# Patient Record
Sex: FEMALE | HISPANIC OR LATINO | Employment: UNEMPLOYED | URBAN - METROPOLITAN AREA
[De-identification: names, ages, dates, MRNs, and addresses within clinical notes are randomized per-mention and may not be internally consistent; named-entity substitution may affect disease eponyms.]

---

## 2017-01-24 ENCOUNTER — APPOINTMENT (OUTPATIENT)
Dept: URGENT CARE | Facility: MEDICAL CENTER | Age: 57
End: 2017-01-24
Payer: OTHER MISCELLANEOUS

## 2017-01-24 PROCEDURE — G0382 LEV 3 HOSP TYPE B ED VISIT: HCPCS

## 2017-01-24 PROCEDURE — 99283 EMERGENCY DEPT VISIT LOW MDM: CPT

## 2017-01-26 ENCOUNTER — APPOINTMENT (OUTPATIENT)
Dept: URGENT CARE | Facility: MEDICAL CENTER | Age: 57
End: 2017-01-26
Payer: OTHER MISCELLANEOUS

## 2017-01-26 PROCEDURE — 99213 OFFICE O/P EST LOW 20 MIN: CPT

## 2017-02-02 ENCOUNTER — HOSPITAL ENCOUNTER (OUTPATIENT)
Dept: RADIOLOGY | Facility: MEDICAL CENTER | Age: 57
Discharge: HOME | End: 2017-02-02
Attending: PHYSICIAN ASSISTANT
Payer: OTHER MISCELLANEOUS

## 2017-02-02 ENCOUNTER — TRANSCRIBE ORDERS (OUTPATIENT)
Dept: URGENT CARE | Facility: MEDICAL CENTER | Age: 57
End: 2017-02-02

## 2017-02-02 ENCOUNTER — APPOINTMENT (OUTPATIENT)
Dept: URGENT CARE | Facility: MEDICAL CENTER | Age: 57
End: 2017-02-02
Payer: OTHER MISCELLANEOUS

## 2017-02-02 DIAGNOSIS — S56.211D: ICD-10-CM

## 2017-02-02 DIAGNOSIS — S56.211D: Primary | ICD-10-CM

## 2017-02-02 PROCEDURE — 99213 OFFICE O/P EST LOW 20 MIN: CPT

## 2017-02-02 PROCEDURE — 73130 X-RAY EXAM OF HAND: CPT

## 2017-02-09 ENCOUNTER — APPOINTMENT (OUTPATIENT)
Dept: URGENT CARE | Facility: MEDICAL CENTER | Age: 57
End: 2017-02-09
Payer: OTHER MISCELLANEOUS

## 2017-02-09 PROCEDURE — 99213 OFFICE O/P EST LOW 20 MIN: CPT

## 2017-02-16 ENCOUNTER — APPOINTMENT (OUTPATIENT)
Dept: URGENT CARE | Facility: MEDICAL CENTER | Age: 57
End: 2017-02-16
Payer: OTHER MISCELLANEOUS

## 2017-02-16 PROCEDURE — 99213 OFFICE O/P EST LOW 20 MIN: CPT

## 2018-02-05 ENCOUNTER — TRANSCRIBE ORDERS (OUTPATIENT)
Dept: ADMINISTRATIVE | Facility: HOSPITAL | Age: 58
End: 2018-02-05

## 2018-02-05 DIAGNOSIS — Z12.39 SCREENING BREAST EXAMINATION: Primary | ICD-10-CM

## 2018-02-12 ENCOUNTER — HOSPITAL ENCOUNTER (OUTPATIENT)
Dept: RADIOLOGY | Facility: HOSPITAL | Age: 58
Discharge: HOME | End: 2018-02-12

## 2018-02-12 DIAGNOSIS — Z12.39 SCREENING BREAST EXAMINATION: ICD-10-CM

## 2018-02-12 PROCEDURE — 77067 SCR MAMMO BI INCL CAD: CPT

## 2018-02-27 NOTE — PROGRESS NOTES
Dr Cleo Stevens was notified, via EPIC that I have been unsuccessful in reaching this patient regarding the need for additional imaging from her screening mammogram

## 2018-03-05 ENCOUNTER — TELEPHONE (OUTPATIENT)
Dept: FAMILY MEDICINE CLINIC | Facility: CLINIC | Age: 58
End: 2018-03-05

## 2018-03-12 ENCOUNTER — APPOINTMENT (EMERGENCY)
Dept: CT IMAGING | Facility: HOSPITAL | Age: 58
End: 2018-03-12

## 2018-03-12 ENCOUNTER — HOSPITAL ENCOUNTER (OUTPATIENT)
Facility: HOSPITAL | Age: 58
Setting detail: OBSERVATION
Discharge: HOME | End: 2018-03-14
Attending: EMERGENCY MEDICINE | Admitting: INTERNAL MEDICINE

## 2018-03-12 ENCOUNTER — APPOINTMENT (EMERGENCY)
Dept: RADIOLOGY | Facility: HOSPITAL | Age: 58
End: 2018-03-12

## 2018-03-12 DIAGNOSIS — E86.0 DEHYDRATION: ICD-10-CM

## 2018-03-12 DIAGNOSIS — R11.10 VOMITING: Primary | ICD-10-CM

## 2018-03-12 DIAGNOSIS — K21.9 GASTROESOPHAGEAL REFLUX DISEASE WITHOUT ESOPHAGITIS: ICD-10-CM

## 2018-03-12 DIAGNOSIS — K52.9 GASTROENTERITIS: ICD-10-CM

## 2018-03-12 DIAGNOSIS — R10.9 ABDOMINAL PAIN: ICD-10-CM

## 2018-03-12 DIAGNOSIS — E11.9 TYPE 2 DIABETES MELLITUS WITHOUT COMPLICATION, WITHOUT LONG-TERM CURRENT USE OF INSULIN (HCC): ICD-10-CM

## 2018-03-12 DIAGNOSIS — R79.89 ELEVATED LACTIC ACID LEVEL: ICD-10-CM

## 2018-03-12 LAB
ACETONE SERPL-MCNC: NEGATIVE MG/DL
ALBUMIN SERPL BCP-MCNC: 4 G/DL (ref 3.5–5)
ALP SERPL-CCNC: 109 U/L (ref 46–116)
ALT SERPL W P-5'-P-CCNC: 22 U/L (ref 12–78)
ANION GAP SERPL CALCULATED.3IONS-SCNC: 11 MMOL/L (ref 4–13)
APTT PPP: 25 SECONDS (ref 23–35)
AST SERPL W P-5'-P-CCNC: 15 U/L (ref 5–45)
BASOPHILS # BLD AUTO: 0.02 THOUSANDS/ΜL (ref 0–0.1)
BASOPHILS NFR BLD AUTO: 0 % (ref 0–1)
BILIRUB SERPL-MCNC: 0.6 MG/DL (ref 0.2–1)
BUN SERPL-MCNC: 20 MG/DL (ref 5–25)
CALCIUM SERPL-MCNC: 9.4 MG/DL (ref 8.3–10.1)
CHLORIDE SERPL-SCNC: 100 MMOL/L (ref 100–108)
CO2 SERPL-SCNC: 30 MMOL/L (ref 21–32)
CREAT SERPL-MCNC: 0.93 MG/DL (ref 0.6–1.3)
EOSINOPHIL # BLD AUTO: 0.02 THOUSAND/ΜL (ref 0–0.61)
EOSINOPHIL NFR BLD AUTO: 0 % (ref 0–6)
ERYTHROCYTE [DISTWIDTH] IN BLOOD BY AUTOMATED COUNT: 12.8 % (ref 11.6–15.1)
GFR SERPL CREATININE-BSD FRML MDRD: 68 ML/MIN/1.73SQ M
GLUCOSE SERPL-MCNC: 232 MG/DL (ref 65–140)
GLUCOSE SERPL-MCNC: 287 MG/DL (ref 65–140)
HCT VFR BLD AUTO: 39.8 % (ref 34.8–46.1)
HGB BLD-MCNC: 13.4 G/DL (ref 11.5–15.4)
INR PPP: 0.94 (ref 0.86–1.16)
LACTATE SERPL-SCNC: 3.3 MMOL/L (ref 0.5–2)
LIPASE SERPL-CCNC: 102 U/L (ref 73–393)
LYMPHOCYTES # BLD AUTO: 1.6 THOUSANDS/ΜL (ref 0.6–4.47)
LYMPHOCYTES NFR BLD AUTO: 9 % (ref 14–44)
MCH RBC QN AUTO: 27.9 PG (ref 26.8–34.3)
MCHC RBC AUTO-ENTMCNC: 33.7 G/DL (ref 31.4–37.4)
MCV RBC AUTO: 83 FL (ref 82–98)
MONOCYTES # BLD AUTO: 0.72 THOUSAND/ΜL (ref 0.17–1.22)
MONOCYTES NFR BLD AUTO: 4 % (ref 4–12)
NEUTROPHILS # BLD AUTO: 14.74 THOUSANDS/ΜL (ref 1.85–7.62)
NEUTS SEG NFR BLD AUTO: 87 % (ref 43–75)
PLATELET # BLD AUTO: 237 THOUSANDS/UL (ref 149–390)
PMV BLD AUTO: 11.7 FL (ref 8.9–12.7)
POTASSIUM SERPL-SCNC: 4 MMOL/L (ref 3.5–5.3)
PROT SERPL-MCNC: 8.2 G/DL (ref 6.4–8.2)
PROTHROMBIN TIME: 12.8 SECONDS (ref 12.1–14.4)
RBC # BLD AUTO: 4.8 MILLION/UL (ref 3.81–5.12)
SODIUM SERPL-SCNC: 141 MMOL/L (ref 136–145)
TROPONIN I SERPL-MCNC: 0.02 NG/ML
WBC # BLD AUTO: 17.1 THOUSAND/UL (ref 4.31–10.16)

## 2018-03-12 PROCEDURE — 85610 PROTHROMBIN TIME: CPT | Performed by: EMERGENCY MEDICINE

## 2018-03-12 PROCEDURE — 87040 BLOOD CULTURE FOR BACTERIA: CPT | Performed by: EMERGENCY MEDICINE

## 2018-03-12 PROCEDURE — 80053 COMPREHEN METABOLIC PANEL: CPT | Performed by: EMERGENCY MEDICINE

## 2018-03-12 PROCEDURE — 82948 REAGENT STRIP/BLOOD GLUCOSE: CPT

## 2018-03-12 PROCEDURE — 85730 THROMBOPLASTIN TIME PARTIAL: CPT | Performed by: EMERGENCY MEDICINE

## 2018-03-12 PROCEDURE — 83690 ASSAY OF LIPASE: CPT | Performed by: EMERGENCY MEDICINE

## 2018-03-12 PROCEDURE — 93005 ELECTROCARDIOGRAM TRACING: CPT

## 2018-03-12 PROCEDURE — 96375 TX/PRO/DX INJ NEW DRUG ADDON: CPT

## 2018-03-12 PROCEDURE — 96361 HYDRATE IV INFUSION ADD-ON: CPT

## 2018-03-12 PROCEDURE — 36415 COLL VENOUS BLD VENIPUNCTURE: CPT | Performed by: EMERGENCY MEDICINE

## 2018-03-12 PROCEDURE — 82009 KETONE BODYS QUAL: CPT | Performed by: EMERGENCY MEDICINE

## 2018-03-12 PROCEDURE — 96374 THER/PROPH/DIAG INJ IV PUSH: CPT

## 2018-03-12 PROCEDURE — 83605 ASSAY OF LACTIC ACID: CPT | Performed by: EMERGENCY MEDICINE

## 2018-03-12 PROCEDURE — 71046 X-RAY EXAM CHEST 2 VIEWS: CPT

## 2018-03-12 PROCEDURE — 74177 CT ABD & PELVIS W/CONTRAST: CPT

## 2018-03-12 PROCEDURE — 84484 ASSAY OF TROPONIN QUANT: CPT | Performed by: EMERGENCY MEDICINE

## 2018-03-12 PROCEDURE — 85025 COMPLETE CBC W/AUTO DIFF WBC: CPT | Performed by: EMERGENCY MEDICINE

## 2018-03-12 RX ORDER — ONDANSETRON 2 MG/ML
4 INJECTION INTRAMUSCULAR; INTRAVENOUS ONCE
Status: COMPLETED | OUTPATIENT
Start: 2018-03-12 | End: 2018-03-12

## 2018-03-12 RX ORDER — SODIUM CHLORIDE 9 MG/ML
75 INJECTION, SOLUTION INTRAVENOUS CONTINUOUS
Status: DISCONTINUED | OUTPATIENT
Start: 2018-03-12 | End: 2018-03-14 | Stop reason: HOSPADM

## 2018-03-12 RX ADMIN — ONDANSETRON 4 MG: 2 INJECTION INTRAMUSCULAR; INTRAVENOUS at 23:23

## 2018-03-12 RX ADMIN — FAMOTIDINE 20 MG: 10 INJECTION, SOLUTION INTRAVENOUS at 23:26

## 2018-03-12 RX ADMIN — SODIUM CHLORIDE 1000 ML: 0.9 INJECTION, SOLUTION INTRAVENOUS at 23:23

## 2018-03-13 PROBLEM — K52.9 GASTROENTERITIS: Status: ACTIVE | Noted: 2018-03-13

## 2018-03-13 PROBLEM — E11.9 TYPE 2 DIABETES MELLITUS WITHOUT COMPLICATION, WITHOUT LONG-TERM CURRENT USE OF INSULIN (HCC): Status: ACTIVE | Noted: 2018-03-13

## 2018-03-13 PROBLEM — R65.20 SEVERE SEPSIS (HCC): Status: ACTIVE | Noted: 2018-03-13

## 2018-03-13 PROBLEM — A41.9 SEVERE SEPSIS (HCC): Status: ACTIVE | Noted: 2018-03-13

## 2018-03-13 PROBLEM — K21.9 GASTROESOPHAGEAL REFLUX DISEASE WITHOUT ESOPHAGITIS: Status: ACTIVE | Noted: 2018-03-13

## 2018-03-13 LAB
ANION GAP SERPL CALCULATED.3IONS-SCNC: 10 MMOL/L (ref 4–13)
ATRIAL RATE: 107 BPM
ATRIAL RATE: 99 BPM
BILIRUB UR QL STRIP: NEGATIVE
BUN SERPL-MCNC: 17 MG/DL (ref 5–25)
CALCIUM SERPL-MCNC: 8.2 MG/DL (ref 8.3–10.1)
CHLORIDE SERPL-SCNC: 105 MMOL/L (ref 100–108)
CLARITY UR: CLEAR
CO2 SERPL-SCNC: 28 MMOL/L (ref 21–32)
COLOR UR: YELLOW
CREAT SERPL-MCNC: 0.7 MG/DL (ref 0.6–1.3)
ERYTHROCYTE [DISTWIDTH] IN BLOOD BY AUTOMATED COUNT: 12.8 % (ref 11.6–15.1)
GFR SERPL CREATININE-BSD FRML MDRD: 96 ML/MIN/1.73SQ M
GLUCOSE P FAST SERPL-MCNC: 194 MG/DL (ref 65–99)
GLUCOSE SERPL-MCNC: 142 MG/DL (ref 65–140)
GLUCOSE SERPL-MCNC: 164 MG/DL (ref 65–140)
GLUCOSE SERPL-MCNC: 180 MG/DL (ref 65–140)
GLUCOSE SERPL-MCNC: 194 MG/DL (ref 65–140)
GLUCOSE SERPL-MCNC: 204 MG/DL (ref 65–140)
GLUCOSE UR STRIP-MCNC: ABNORMAL MG/DL
HCT VFR BLD AUTO: 34.2 % (ref 34.8–46.1)
HGB BLD-MCNC: 11.5 G/DL (ref 11.5–15.4)
HGB UR QL STRIP.AUTO: NEGATIVE
KETONES UR STRIP-MCNC: ABNORMAL MG/DL
LACTATE SERPL-SCNC: 1.8 MMOL/L (ref 0.5–2)
LACTATE SERPL-SCNC: 2.6 MMOL/L (ref 0.5–2)
LACTATE SERPL-SCNC: 2.9 MMOL/L (ref 0.5–2)
LACTATE SERPL-SCNC: 2.9 MMOL/L (ref 0.5–2)
LEUKOCYTE ESTERASE UR QL STRIP: NEGATIVE
MCH RBC QN AUTO: 28.2 PG (ref 26.8–34.3)
MCHC RBC AUTO-ENTMCNC: 33.6 G/DL (ref 31.4–37.4)
MCV RBC AUTO: 84 FL (ref 82–98)
NITRITE UR QL STRIP: NEGATIVE
P AXIS: 0 DEGREES
P AXIS: 55 DEGREES
PH UR STRIP.AUTO: 5.5 [PH] (ref 4.5–8)
PLATELET # BLD AUTO: 207 THOUSANDS/UL (ref 149–390)
PLATELET # BLD AUTO: 207 THOUSANDS/UL (ref 149–390)
PMV BLD AUTO: 11.3 FL (ref 8.9–12.7)
PMV BLD AUTO: 11.3 FL (ref 8.9–12.7)
POTASSIUM SERPL-SCNC: 3.8 MMOL/L (ref 3.5–5.3)
PR INTERVAL: 182 MS
PROT UR STRIP-MCNC: NEGATIVE MG/DL
QRS AXIS: 51 DEGREES
QRS AXIS: 62 DEGREES
QRSD INTERVAL: 78 MS
QRSD INTERVAL: 82 MS
QT INTERVAL: 344 MS
QT INTERVAL: 356 MS
QTC INTERVAL: 456 MS
QTC INTERVAL: 459 MS
RBC # BLD AUTO: 4.08 MILLION/UL (ref 3.81–5.12)
SODIUM SERPL-SCNC: 143 MMOL/L (ref 136–145)
SP GR UR STRIP.AUTO: 1.01 (ref 1–1.03)
T WAVE AXIS: 65 DEGREES
T WAVE AXIS: 80 DEGREES
UROBILINOGEN UR QL STRIP.AUTO: 0.2 E.U./DL
VENTRICULAR RATE: 107 BPM
VENTRICULAR RATE: 99 BPM
WBC # BLD AUTO: 9.83 THOUSAND/UL (ref 4.31–10.16)

## 2018-03-13 PROCEDURE — 99220 PR INITIAL OBSERVATION CARE/DAY 70 MINUTES: CPT | Performed by: INTERNAL MEDICINE

## 2018-03-13 PROCEDURE — 93010 ELECTROCARDIOGRAM REPORT: CPT | Performed by: INTERNAL MEDICINE

## 2018-03-13 PROCEDURE — 80048 BASIC METABOLIC PNL TOTAL CA: CPT | Performed by: PHYSICIAN ASSISTANT

## 2018-03-13 PROCEDURE — 83605 ASSAY OF LACTIC ACID: CPT | Performed by: INTERNAL MEDICINE

## 2018-03-13 PROCEDURE — 83605 ASSAY OF LACTIC ACID: CPT | Performed by: PHYSICIAN ASSISTANT

## 2018-03-13 PROCEDURE — 85027 COMPLETE CBC AUTOMATED: CPT | Performed by: PHYSICIAN ASSISTANT

## 2018-03-13 PROCEDURE — 83605 ASSAY OF LACTIC ACID: CPT | Performed by: EMERGENCY MEDICINE

## 2018-03-13 PROCEDURE — 81003 URINALYSIS AUTO W/O SCOPE: CPT

## 2018-03-13 PROCEDURE — 96361 HYDRATE IV INFUSION ADD-ON: CPT

## 2018-03-13 PROCEDURE — 82948 REAGENT STRIP/BLOOD GLUCOSE: CPT

## 2018-03-13 PROCEDURE — 99285 EMERGENCY DEPT VISIT HI MDM: CPT

## 2018-03-13 PROCEDURE — 36415 COLL VENOUS BLD VENIPUNCTURE: CPT | Performed by: EMERGENCY MEDICINE

## 2018-03-13 PROCEDURE — 85049 AUTOMATED PLATELET COUNT: CPT | Performed by: PHYSICIAN ASSISTANT

## 2018-03-13 RX ORDER — ACETAMINOPHEN 325 MG/1
650 TABLET ORAL EVERY 6 HOURS PRN
Status: DISCONTINUED | OUTPATIENT
Start: 2018-03-13 | End: 2018-03-14 | Stop reason: HOSPADM

## 2018-03-13 RX ORDER — ONDANSETRON 2 MG/ML
4 INJECTION INTRAMUSCULAR; INTRAVENOUS EVERY 6 HOURS PRN
Status: DISCONTINUED | OUTPATIENT
Start: 2018-03-13 | End: 2018-03-14 | Stop reason: HOSPADM

## 2018-03-13 RX ORDER — PRAVASTATIN SODIUM 40 MG
40 TABLET ORAL
Status: DISCONTINUED | OUTPATIENT
Start: 2018-03-13 | End: 2018-03-14 | Stop reason: HOSPADM

## 2018-03-13 RX ORDER — PRAVASTATIN SODIUM 40 MG
1 TABLET ORAL
COMMUNITY
Start: 2015-02-16 | End: 2018-10-26 | Stop reason: ALTCHOICE

## 2018-03-13 RX ORDER — CALCIUM CARBONATE 200(500)MG
1000 TABLET,CHEWABLE ORAL DAILY PRN
Status: DISCONTINUED | OUTPATIENT
Start: 2018-03-13 | End: 2018-03-14 | Stop reason: HOSPADM

## 2018-03-13 RX ORDER — SENNOSIDES 8.6 MG
1 TABLET ORAL DAILY
Status: DISCONTINUED | OUTPATIENT
Start: 2018-03-13 | End: 2018-03-14 | Stop reason: HOSPADM

## 2018-03-13 RX ADMIN — SODIUM CHLORIDE 125 ML/HR: 0.9 INJECTION, SOLUTION INTRAVENOUS at 19:13

## 2018-03-13 RX ADMIN — SODIUM CHLORIDE 125 ML/HR: 0.9 INJECTION, SOLUTION INTRAVENOUS at 03:19

## 2018-03-13 RX ADMIN — INSULIN LISPRO 2 UNITS: 100 INJECTION, SOLUTION INTRAVENOUS; SUBCUTANEOUS at 23:16

## 2018-03-13 RX ADMIN — SODIUM CHLORIDE 1000 ML: 0.9 INJECTION, SOLUTION INTRAVENOUS at 02:32

## 2018-03-13 RX ADMIN — ENOXAPARIN SODIUM 40 MG: 40 INJECTION SUBCUTANEOUS at 08:50

## 2018-03-13 RX ADMIN — SODIUM CHLORIDE 1000 ML: 0.9 INJECTION, SOLUTION INTRAVENOUS at 05:45

## 2018-03-13 RX ADMIN — PIPERACILLIN SODIUM,TAZOBACTAM SODIUM 3.38 G: 3; .375 INJECTION, POWDER, FOR SOLUTION INTRAVENOUS at 04:11

## 2018-03-13 RX ADMIN — IOHEXOL 100 ML: 350 INJECTION, SOLUTION INTRAVENOUS at 00:03

## 2018-03-13 RX ADMIN — PRAVASTATIN SODIUM 40 MG: 40 TABLET ORAL at 15:45

## 2018-03-13 RX ADMIN — ACETAMINOPHEN 650 MG: 325 TABLET, FILM COATED ORAL at 15:45

## 2018-03-13 NOTE — ED PROVIDER NOTES
History  Chief Complaint   Patient presents with    Vomiting     pt presents with c/o of vomiting x 5 since about 2100 tonight  began not feeling wee/"shakey" around 20:00  reports mild abdominal pain since vomiting     Patient is a 63year old female with nausea and vomiting with abdominal pain since tonight while at work  No diarrhea  No fever but had shivering  No urinary sx  No abdominal surgery  Has a h/o diabetes  No recent old records from this ED seen on computer system  [facility] website checked on this patient and patient not found  History provided by:  Patient and relative (daughter)  Vomiting   Associated symptoms: abdominal pain    Associated symptoms: no diarrhea and no fever        Prior to Admission Medications   Prescriptions Last Dose Informant Patient Reported? Taking?   metFORMIN (GLUCOPHAGE) 1000 MG tablet   Yes Yes   Sig: Take 1 tablet by mouth 2 (two) times a day   pravastatin (PRAVACHOL) 40 mg tablet   Yes Yes   Sig: Take 1 tablet by mouth      Facility-Administered Medications: None       Past Medical History:   Diagnosis Date    Diabetes mellitus ([facility] [address] )        No past surgical history on file  No family history on file  I have reviewed and agree with the history as documented  Social History   Substance Use Topics    Smoking status: Never Smoker    Smokeless tobacco: Never Used    Alcohol use No        Review of Systems   Constitutional: Negative for fever  Gastrointestinal: Positive for abdominal pain, nausea and vomiting  Negative for diarrhea  Genitourinary: Negative for difficulty urinating  All other systems reviewed and are negative        Physical Exam  ED Triage Vitals [03/12/18 2250]   Temperature Pulse Respirations Blood Pressure SpO2   98 5 °F (36 9 °C) 102 18 145/66 97 %      Temp Source Heart Rate Source Patient Position - Orthostatic VS BP Location FiO2 (%)   Oral Monitor Sitting Right arm --      Pain Score       8           Orthostatic Vital Signs  Vitals:    03/12/18 2250 03/13/18 0150   BP: 145/66 134/68   Pulse: 102 90   Patient Position - Orthostatic VS: Sitting Lying       Physical Exam   Constitutional: She is oriented to person, place, and time  She appears well-developed and well-nourished  She appears distressed (moderate)  HENT:   Head: Normocephalic and atraumatic  Mucous membranes somewhat moist     Eyes: No scleral icterus  Neck: Normal range of motion  Neck supple  Cardiovascular: Regular rhythm and normal heart sounds  No murmur heard  Tachycardia  Pulmonary/Chest: Effort normal and breath sounds normal  No stridor  No respiratory distress  Abdominal: Soft  Bowel sounds are normal  She exhibits no distension  There is tenderness (diffuse)  There is no rebound and no guarding  Musculoskeletal: She exhibits no edema or deformity  Neurological: She is alert and oriented to person, place, and time  Skin: Skin is warm and dry  No rash noted  Psychiatric: She has a normal mood and affect  Nursing note and vitals reviewed  ED Medications  Medications   sodium chloride 0 9 % infusion (not administered)   sodium chloride 0 9 % bolus 1,000 mL (1,000 mL Intravenous New Bag 3/13/18 0232)   ondansetron (ZOFRAN) injection 4 mg (4 mg Intravenous Given 3/12/18 2323)   sodium chloride 0 9 % bolus 1,000 mL (0 mL Intravenous Stopped 3/13/18 0228)   famotidine (PEPCID) injection 20 mg (20 mg Intravenous Given 3/12/18 2326)   iohexol (OMNIPAQUE) 350 MG/ML injection (MULTI-DOSE) 100 mL (100 mL Intravenous Given 3/13/18 0003)       Diagnostic Studies  Results Reviewed     Procedure Component Value Units Date/Time    Lactic acid, plasma [[id number]]  (Abnormal) Collected:  03/13/18 0148    Lab Status:  Final result Specimen:  Blood from Arm, Right Updated:  03/13/18 0222     LACTIC ACID 2 9 (HH) mmol/L     Narrative:         Result may be elevated if tourniquet was used during collection      Lactic acid, plasma [[id number]] (Abnormal) Collected:  03/12/18 2303    Lab Status:  Final result Specimen:  Blood from Arm, Right Updated:  03/12/18 2347     LACTIC ACID 3 3 (HH) mmol/L     Narrative:         Result may be elevated if tourniquet was used during collection  Blood culture #1 [[id number]] Collected:  03/12/18 2321    Lab Status: In process Specimen:  Blood from Arm, Left Updated:  03/12/18 2346    Troponin I [[id number]]  (Normal) Collected:  03/12/18 2303    Lab Status:  Final result Specimen:  Blood from Arm, Right Updated:  03/12/18 2340     Troponin I 0 02 ng/mL     Narrative:         [employer] Chemistry analyzer 99% cutoff is > 0 04 ng/mL in network labs    o cTnI 99% cutoff is useful only when applied to patients in the clinical setting of myocardial ischemia  o cTnI 99% cutoff should be interpreted in the context of clinical history, ECG findings and possibly cardiac imaging to establish correct diagnosis  o cTnI 99% cutoff may be suggestive but clearly not indicative of a coronary event without the clinical setting of myocardial ischemia  Pk Billings [[id number]]  (Normal) Collected:  03/12/18 2303    Lab Status:  Final result Specimen:  Blood from Arm, Right Updated:  03/12/18 2330     Protime 12 8 seconds      INR 0 94    APTT [[id number]]  (Normal) Collected:  03/12/18 2303    Lab Status:  Final result Specimen:  Blood from Arm, Right Updated:  03/12/18 2330     PTT 25 seconds     Narrative:          Therapeutic Heparin Range = 60-90 seconds    Comprehensive metabolic panel [[id number]]  (Abnormal) Collected:  03/12/18 2303    Lab Status:  Final result Specimen:  Blood from Arm, Right Updated:  03/12/18 2329     Sodium 141 mmol/L      Potassium 4 0 mmol/L      Chloride 100 mmol/L      CO2 30 mmol/L      Anion Gap 11 mmol/L      BUN 20 mg/dL      Creatinine 0 93 mg/dL      Glucose 287 (H) mg/dL      Calcium 9 4 mg/dL      AST 15 U/L      ALT 22 U/L      Alkaline Phosphatase 109 U/L      Total Protein 8 2 g/dL      Albumin 4 0 g/dL      Total Bilirubin 0 60 mg/dL      eGFR 68 ml/min/1 73sq m     Narrative:         National Kidney Disease Education Program recommendations are as follows:  GFR calculation is accurate only with a steady state creatinine  Chronic Kidney disease less than 60 ml/min/1 73 sq  meters  Kidney failure less than 15 ml/min/1 73 sq  meters  Lipase [[id number]]  (Normal) Collected:  03/12/18 2303    Lab Status:  Final result Specimen:  Blood from Arm, Right Updated:  03/12/18 2329     Lipase 102 u/L     Acetone [[id number]]  (Normal) Collected:  03/12/18 2303    Lab Status:  Final result Specimen:  Blood from Arm, Right Updated:  03/12/18 2322     Acetone, Bld Negative    CBC and differential [[id number]]  (Abnormal) Collected:  03/12/18 2303    Lab Status:  Final result Specimen:  Blood from Arm, Right Updated:  03/12/18 2313     WBC 17 10 (H) Thousand/uL      RBC 4 80 Million/uL      Hemoglobin 13 4 g/dL      Hematocrit 39 8 %      MCV 83 fL      MCH 27 9 pg      MCHC 33 7 g/dL      RDW 12 8 %      MPV 11 7 fL      Platelets 393 Thousands/uL      Neutrophils Relative 87 (H) %      Lymphocytes Relative 9 (L) %      Monocytes Relative 4 %      Eosinophils Relative 0 %      Basophils Relative 0 %      Neutrophils Absolute 14 74 (H) Thousands/µL      Lymphocytes Absolute 1 60 Thousands/µL      Monocytes Absolute 0 72 Thousand/µL      Eosinophils Absolute 0 02 Thousand/µL      Basophils Absolute 0 02 Thousands/µL     Blood culture #2 [[id number]] Collected:  03/12/18 2303    Lab Status: In process Specimen:  Blood from Arm, Right Updated:  03/12/18 2309    Fingerstick Glucose (POCT) [[id number]]  (Abnormal) Collected:  03/12/18 2301    Lab Status:  Final result Updated:  03/12/18 2307     POC Glucose 232 (H) mg/dl                  CT abdomen pelvis with contrast   ED Interpretation by Lia Nunes MD (03/13 0032)   FINDINGS:      ABDOMEN      LOWER CHEST:  Mild hypoventilatory changes at the lung bases  LIVER/BILIARY TREE: The liver is normal in contour   There is a 3 3 x 2 4 cm mildly heterogeneous hyperenhancing mass within the right lobe of the liver (series 2, image 24) which likely represents a hemangioma  GALLBLADDER:  No calcified gallstones  No pericholecystic inflammatory change  SPLEEN:  Unremarkable  PANCREAS:  Unremarkable  ADRENAL GLANDS:  Unremarkable  KIDNEYS/URETERS: [place] are a few renal hypodensities too small to characterize however likely represent cysts   No hydronephrosis  STOMACH AND BOWEL: Kvng Schaffer is no bowel obstruction   No significant bowel wall thickening  APPENDIX:  No findings to suggest appendicitis  ABDOMINOPELVIC CAVITY:  No ascites or free intraperitoneal air  No lymphadenopathy  VESSELS:  Unremarkable for patient's age  PELVIS      REPRODUCTIVE ORGANS:  Unremarkable for patient's age  URINARY BLADDER:  The bladder wall is mildly thickened however it is under distended  ABDOMINAL WALL/INGUINAL REGIONS:  Bilateral fat-containing inguinal hernias  OSSEOUS STRUCTURES:  No acute fracture or destructive osseous lesion  Impression:        1   No acute inflammatory process identified within the abdomen or pelvis  2   Mildly thickened bladder wall which may be due to underdistention   Correlate with urinalysis to exclude infection  3   3 3 cm hyperenhancing mass within the right lobe the liver likely represents a hemangioma   Further evaluation with nonemergent ultrasound may be obtained  Workstation performed: [id number]         Final Result by Reid Samson MD (03/13 0028)      1  No acute inflammatory process identified within the abdomen or pelvis  2   Mildly thickened bladder wall which may be due to underdistention  Correlate with urinalysis to exclude infection  3   3 3 cm hyperenhancing mass within the right lobe the liver likely represents a hemangioma    Further evaluation with nonemergent ultrasound may be obtained  Workstation performed: [id number]         XR chest 2 views   ED Interpretation by Austen Lares MD (03/13 0014)   Elevated R hemidiaphragm read by me  Final Result by Beltran Gudino MD (03/13 0028)      No focal consolidation  Workstation performed: [id number]                    Procedures  ECG 12 Lead Documentation  Date/Time: 3/12/2018 11:22 PM  Performed by: Almas Felton  Authorized by: Almas Felton     Indications / Diagnosis:  Abdominal pain  ECG reviewed by me, the ED Provider: yes    Patient location:  ED  Quality:     Tracing quality:  Limited by artifact  Rate:     ECG rate:  99    ECG rate assessment: normal    Rhythm:     Rhythm: sinus rhythm    Ectopy:     Ectopy: none    QRS:     QRS axis:  Normal    QRS intervals:  Normal  Conduction:     Conduction: normal    ST segments:     ST segments:  Normal  T waves:     T waves: normal    Comments:      I do not agree with computer reading of nonspecific ST abnormality  Phone Contacts  ED Phone Contact    ED Course  ED Course as of Mar 13 0234   Tue Mar 13, 2018   0022 Nausea improved  Labs and CXR d/w patient and daughter  0206 CT d/w patient and daughter  Patient tolerating po      0228 Lactate still elevated so admission indicated and patient unable to provide urine sample yet  Initial Sepsis Screening     Row Name 03/12/18 3121                Is the patient's history suggestive of a new or worsening infection? No  -AO        Suspected source of infection          Are two or more of the following signs & symptoms of infection both present and new to the patient?           Indicate SIRS criteria          If the answer is yes to both questions, suspicion of sepsis is present          If severe sepsis is present AND tissue hypoperfusion perists in the hour after fluid resuscitation or lactate > 4, the patient meets criteria for SEPTIC SHOCK         Are any of the following organ dysfunction criteria present within 6 hours of suspected infection and SIRS criteria that are NOT considered to be chronic conditions?         Organ dysfunction          Date of presentation of severe sepsis          Time of presentation of severe sepsis          Tissue hypoperfusion persists in the hour after crystalloid fluid administration, evidenced, by either:          Was hypotension present within one hour of the conclusion of crystalloid fluid administration?         Date of presentation of septic shock          Time of presentation of septic shock            User Key  (r) = Recorded By, (t) = Taken By, (c) = Cosigned By    [address] Name Provider Jil Montenegro MD Physician                  MDM  Number of Diagnoses or Management Options  Diagnosis management comments: DDx including but not limited to: appendicitis, gastroenteritis, gastritis, PUD, GERD, gastroparesis, hepatitis, pancreatitis, colitis, enteritis, diverticulitis, food poisoning, mesenteric adenitis, mesenteric ischemia, IBD, IBS, ileus, bowel obstruction, volvulus, internal hernia, AAA, cholecystitis, biliary colic, choledocholithiasis, perforated viscus, splenic etiology, constipation, pelvic pathology, renal colic, pyelonephritis, UTI; doubt cardiac etiology          Amount and/or Complexity of Data Reviewed  Clinical lab tests: ordered and reviewed  Tests in the radiology section of CPT®: ordered and reviewed  Decide to obtain previous medical records or to obtain history from someone other than the patient: yes  Obtain history from someone other than the patient: yes  Independent visualization of images, tracings, or specimens: yes      CritCare Time    Disposition  Final diagnoses:   Vomiting   Abdominal pain   Dehydration   Elevated lactic acid level     Time reflects when diagnosis was documented in both MDM as applicable and the Disposition within this note     Time User Action Codes Description Comment    3/13/2018  2:33 AM Cipriano Mercy Add [R11 10] Vomiting     3/13/2018  2:33 AM Cipriano Mercy Add [R10 9] Abdominal pain     3/13/2018  2:34 AM Cipriano Mercy Add [E86 0] Dehydration     3/13/2018  2:34 AM Cipriano Mercy Add [R79 89] Elevated lactic acid level       ED Disposition     ED Disposition Condition Comment    Admit  Case was discussed with MARCO Rueda and the patient's admission status was agreed to be Admission Status: observation status to the service of Dr Kavitha Hernández   Follow-up Information    None       Patient's Medications   Discharge Prescriptions    No medications on file     No discharge procedures on file      ED Provider  Electronically Signed by           Caren Leigh MD  03/13/18 0222

## 2018-03-13 NOTE — CASE MANAGEMENT
Initial Clinical Review    Admission: Date/Time/Statement: 03/13/2018 @ 02:34    Orders Placed This Encounter   Procedures    Place in Observation (expected length of stay for this patient is less than two midnights)     Standing Status:   Standing     Number of Occurrences:   1     Order Specific Question:   Admitting Physician     Answer:   [facility]     Order Specific Question:   Level of Care     Answer:   Med Surg [16]         ED: Date/Time/Mode of Arrival:   ED Arrival Information     Expected Arrival Acuity Means of Arrival Escorted By Service Admission Type    - 3/12/2018 22:39 Urgent Walk-In Self General Medicine Urgent    Arrival Complaint    Shakiness, vomiting          Chief Complaint:   Chief Complaint   Patient presents with    Vomiting     pt presents with c/o of vomiting x 5 since about 2100 tonight  began not feeling wee/"shakey" around 20:00  reports mild abdominal pain since vomiting       History of Illness: Patient is a 63year old female with nausea and vomiting with abdominal pain since tonight while at work  No diarrhea  No fever but had shivering  No urinary sx  No abdominal surgery  Has a h/o diabetes  ED Vital Signs:   ED Triage Vitals [03/12/18 2250]   Temperature Pulse Respirations Blood Pressure SpO2   98 5 °F (36 9 °C) 102 18 145/66 97 %      Temp Source Heart Rate Source Patient Position - Orthostatic VS BP Location FiO2 (%)   Oral Monitor Sitting Right arm --      Pain Score       8        Wt Readings from Last 1 Encounters:   03/12/18 77 kg (169 lb 12 1 oz)       Abnormal Labs/Diagnostic Test Results: Lactic Acid 3 3, Glucose 287, WBC 17 10, Neutros PCT 87    CT of Abd/Pelvis:      1   No acute inflammatory process identified within the abdomen or pelvis  2   Mildly thickened bladder wall which may be due to underdistention   Correlate with urinalysis to exclude infection    3   3 3 cm hyperenhancing mass within the right lobe the liver likely represents a hemangioma            ED Treatment:   Medication Administration from 03/12/2018 2239 to 03/13/2018 3728       Date/Time Order Dose Route Action Action by Comments     03/12/2018 2323 ondansetron (ZOFRAN) injection 4 mg 4 mg Intravenous Given April  Danilohichetan Stark, RN      03/13/2018 4587 sodium chloride 0 9 % bolus 1,000 mL 0 mL Intravenous Stopped April M Danilohichetan Stark, RN      03/12/2018 2323 sodium chloride 0 9 % bolus 1,000 mL 1,000 mL Intravenous New Bag April  Daniloabhijeet Mi, RN      03/13/2018 0319 sodium chloride 0 9 % infusion 125 mL/hr Intravenous New Bag Pleasant Clonts, RN      03/12/2018 2326 famotidine (PEPCID) injection 20 mg 20 mg Intravenous Given April  Delshireenchetan Stark, RN      03/13/2018 0003 iohexol (OMNIPAQUE) 350 MG/ML injection (MULTI-DOSE) 100 mL 100 mL Intravenous Given Chaneta Going      03/13/2018 0413 sodium chloride 0 9 % bolus 1,000 mL 0 mL Intravenous Stopped Pleasant Clonts, RN      03/13/2018 0232 sodium chloride 0 9 % bolus 1,000 mL 1,000 mL Intravenous New Bag April Adriana Islas, RN      03/13/2018 1915 senna (SENOKOT) tablet 8 6 mg 8 6 mg Oral Not Given Davi Greenfield RN      03/13/2018 0850 enoxaparin (LOVENOX) subcutaneous injection 40 mg 40 mg Subcutaneous Given Davi Greenfield RN      03/13/2018 0849 insulin lispro (HumaLOG) 100 units/mL subcutaneous injection 1-6 Units 1 Units Subcutaneous Not Given Davi Greenfield RN pt not eating breakfast     03/13/2018 0501 piperacillin-tazobactam (ZOSYN) 3 375 g in sodium chloride 0 9 % 50 mL IVPB 0 g Intravenous Stopped Pleasant Clonts, RN      03/13/2018 0411 piperacillin-tazobactam (ZOSYN) 3 375 g in sodium chloride 0 9 % 50 mL IVPB 3 375 g Intravenous [address] Pleasant Clonts, RN      03/13/2018 0817 sodium chloride 0 9 % bolus 1,000 mL 0 mL Intravenous Stopped Davi Greenfield, RN      03/13/2018 0545 sodium chloride 0 9 % bolus 1,000 mL 1,000 mL Intravenous New Bag Pleasant Clonts, RN         Physical Exam Constitutional: She is oriented to person, place, and time  She appears well-developed and well-nourished  She appears distressed (moderate)  Cardiovascular: Regular rhythm and normal heart sounds  No murmur heard  Tachycardia  Pulmonary/Chest: Effort normal and breath sounds normal  No stridor  No respiratory distress  Abdominal: Soft  Bowel sounds are normal  She exhibits no distension  There is tenderness (diffuse)  There is no rebound and no guarding  Past Medical/Surgical History: Active Ambulatory Problems     Diagnosis Date Noted    No Active Ambulatory Problems     Resolved Ambulatory Problems     Diagnosis Date Noted    No Resolved Ambulatory Problems     Past Medical History:   Diagnosis Date    Diabetes mellitus ([facility] [address] )        Admitting Diagnosis: Sherrell [R25 1]    Age/Sex: 63 y o  female    Assessment/Plan:     * Severe sepsis (HCC)   Assessment & Plan     · POA, tachycardia, leukocytosis, lactic acidosis  · Suspect GI source, likely viral given acuity of onset of symptoms  · IVF  · Trend lactic until <2  · Received Zosyn x1 in ED-- monitor off further abx  · Trend labs, vitals          Gastroenteritis   Assessment & Plan     · Acute onset nausea/vomiting, improving after IVF and antiemetics  · Continue supportive care          Type 2 diabetes mellitus without complication, without long-term current use of insulin (HCC)   Assessment & Plan     · BS elevated upon admission  · Hold metformin  · SSI for correction          Gastroesophageal reflux disease without esophagitis   Assessment & Plan     · Continue PPI                VTE Prophylaxis: Enoxaparin (Lovenox)  / sequential compression device   Code Status:  Level 1 full code  POLST: POLST form is not discussed and not completed at this time    Discussion with family:  Family aware of admission     Anticipated Length of Stay:  Patient will be admitted on an Observation basis with an anticipated length of stay of  less than 2 midnights     Justification for Hospital Stay:  IV fluid hydration, supportive care         Admission Orders:  Observation/MedSurg  Bilateral Sequential Compression Device  Clear Liquids  SSI  NSS @ 125    Scheduled Meds:   Current Facility-Administered Medications:  enoxaparin 40 mg Subcutaneous Daily   insulin lispro 1-6 Units Subcutaneous TID AC   insulin lispro 1-6 Units Subcutaneous HS   pravastatin 40 mg Oral Daily With Dinner   senna 1 tablet Oral Daily

## 2018-03-13 NOTE — PLAN OF CARE
Problem: DISCHARGE PLANNING - CARE MANAGEMENT  Goal: Discharge to post-acute care or home with appropriate resources  INTERVENTIONS:  - Conduct assessment to determine patient/family and health care team treatment goals, and need for post-acute services based on payer coverage, community resources, and patient preferences, and barriers to discharge  - Address psychosocial, clinical, and financial barriers to discharge as identified in assessment in conjunction with the patient/family and health care team  - Arrange appropriate level of post-acute services according to patients   needs and preference and payer coverage in collaboration with the physician and health care team  - Communicate with and update the patient/family, physician, and health care team regarding progress on the discharge plan  - Arrange appropriate transportation to post-acute venues  Outcome: Progressing  Observation notice reviewed  Copy provided to patient  Copy placed in medical records

## 2018-03-13 NOTE — ASSESSMENT & PLAN NOTE
· POA, tachycardia, leukocytosis, lactic acidosis  · Suspect GI source, likely viral given acuity of onset of symptoms  · IVF  · Trend lactic until <2  · Received Zosyn x1 in ED-- monitor off further abx  · Trend labs, vitals

## 2018-03-13 NOTE — H&P
H&P- Sonia Goodwin 1960, 63 y o  female MRN: [id number]    Unit/Bed#: ED 14 Encounter: [id number]    Primary Care Provider: Mihir Betancourt DO   Date and time admitted to hospital: 3/12/2018 10:45 PM    * Severe sepsis (HCC)   Assessment & Plan    · POA, tachycardia, leukocytosis, lactic acidosis  · Suspect GI source, likely viral given acuity of onset of symptoms  · IVF  · Trend lactic until <2  · Received Zosyn x1 in ED-- monitor off further abx  · Trend labs, vitals        Gastroenteritis   Assessment & Plan    · Acute onset nausea/vomiting, improving after IVF and antiemetics  · Continue supportive care        Type 2 diabetes mellitus without complication, without long-term current use of insulin (HCC)   Assessment & Plan    · BS elevated upon admission  · Hold metformin  · SSI for correction        Gastroesophageal reflux disease without esophagitis   Assessment & Plan    · Continue PPI            VTE Prophylaxis: Enoxaparin (Lovenox)  / sequential compression device   Code Status:  Level 1 full code  POLST: POLST form is not discussed and not completed at this time  Discussion with family:  Family aware of admission    Anticipated Length of Stay:  Patient will be admitted on an Observation basis with an anticipated length of stay of  less than 2 midnights  Justification for Hospital Stay:  IV fluid hydration, supportive care    Total Time for Visit, including Counseling / Coordination of Care: 30 minutes  Greater than 50% of this total time spent on direct patient counseling and coordination of care  Chief Complaint:   Vomiting    History of Present Illness:    Sonia Goodwin is a 63 y o  female with past medical history significant for GERD and type 2 diabetes presents the ED for evaluation of vomiting that started acutely at approximately 9:00 p m  last night  Patient reports 5 episodes of vomiting since 9:00 p m  last night    Reports associated nausea and some abdominal pain that began after vomiting  Also reports having chills  Denies any diarrhea  Denies any fevers  Patient denies any ingestion of raw eggs/meat/undercooked food  She denies sick contacts with similar symptoms  Nothing has made symptoms better or worse prior to arrival   Patient does reports some improvement with IV fluids and antiemetics  Review of Systems:    Review of Systems   Constitutional: Positive for chills  HENT: Negative  Eyes: Negative  Respiratory: Negative  Cardiovascular: Negative  Gastrointestinal: Positive for abdominal pain, nausea and vomiting  Genitourinary: Negative  Musculoskeletal: Negative  Skin: Negative  Neurological: Negative  Hematological: Negative  Psychiatric/Behavioral: Negative  Past Medical and Surgical History:     Past Medical History:   Diagnosis Date    Diabetes mellitus ([facility] [address] )        No past surgical history on file  Meds/Allergies:    Prior to Admission medications    Medication Sig Start Date End Date Taking? 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  Patient Pre-hospital Living Situation:  Independently  Patient Pre-hospital Level of Mobility:  Independent  Patient Pre-hospital Diet Restrictions:  Diabetic prudent  Substance Use History:   History   Alcohol Use No     History   Smoking Status    Never Smoker   Smokeless Tobacco    Never Used     History   Drug Use No       Family History:    non-contributory    Physical Exam:     Vitals:   Blood Pressure: 159/78 (03/13/18 0400)  Pulse: 84 (03/13/18 0400)  Temperature: 98 5 °F (36 9 °C) (03/12/18 2250)  Temp Source: Oral (03/12/18 2250)  Respirations: 18 (03/13/18 0323)  Weight - Scale: 77 kg (169 lb 12 1 oz) (03/12/18 2250)  SpO2: 97 % (03/13/18 0400)    Physical Exam   Constitutional: She is oriented to person, place, and time  She appears well-developed and well-nourished  No distress  HENT:   Head: Normocephalic and atraumatic  Mouth/Throat: No oropharyngeal exudate  Eyes: Conjunctivae and EOM are normal  No scleral icterus  Cardiovascular: Normal rate and regular rhythm  Exam reveals no gallop and no friction rub  No murmur heard  Pulmonary/Chest: Effort normal and breath sounds normal  No respiratory distress  She has no wheezes  She has no rales  Abdominal: Soft  Bowel sounds are normal  She exhibits no distension  There is tenderness  There is no rebound and no guarding  Musculoskeletal: She exhibits no edema or tenderness  Neurological: She is alert and oriented to person, place, and time  She displays normal reflexes  No cranial nerve deficit  She exhibits normal muscle tone  Skin: Skin is warm and dry  No rash noted  She is not diaphoretic  No erythema  Psychiatric: She has a normal mood and affect  Her behavior is normal        Additional Data:     Lab Results: I have personally reviewed pertinent reports  Results from last 7 days  Lab Units 03/12/18  2303   WBC Thousand/uL 17 10*   HEMOGLOBIN g/dL 13 4   HEMATOCRIT % 39 8   PLATELETS Thousands/uL 237   NEUTROS PCT % 87*   LYMPHS PCT % 9*   MONOS PCT % 4   EOS PCT % 0       Results from last 7 days  Lab Units 03/12/18  2303   SODIUM mmol/L 141   POTASSIUM mmol/L 4 0   CHLORIDE mmol/L 100   CO2 mmol/L 30   BUN mg/dL 20   CREATININE mg/dL 0 93   CALCIUM mg/dL 9 4   TOTAL PROTEIN g/dL 8 2   BILIRUBIN TOTAL mg/dL 0 60   ALK PHOS U/L 109   ALT U/L 22   AST U/L 15   GLUCOSE RANDOM mg/dL 287*       Results from last 7 days  Lab Units 03/12/18  2303   INR  0 94       Imaging: I have personally reviewed pertinent reports        CT abdomen pelvis with contrast   ED Interpretation by Ramesh Singer MD (03/13 0032)   FINDINGS:      ABDOMEN      LOWER CHEST:  Mild hypoventilatory changes at the lung bases  LIVER/BILIARY TREE: The liver is normal in contour   There is a 3 3 x 2 4 cm mildly heterogeneous hyperenhancing mass within the right lobe of the liver (series 2, image 24) which likely represents a hemangioma  GALLBLADDER:  No calcified gallstones  No pericholecystic inflammatory change  SPLEEN:  Unremarkable  PANCREAS:  Unremarkable  ADRENAL GLANDS:  Unremarkable  KIDNEYS/URETERS: Antonetta Balsam are a few renal hypodensities too small to characterize however likely represent cysts   No hydronephrosis  STOMACH AND BOWEL: Uche Rios is no bowel obstruction   No significant bowel wall thickening  APPENDIX:  No findings to suggest appendicitis  ABDOMINOPELVIC CAVITY:  No ascites or free intraperitoneal air  No lymphadenopathy  VESSELS:  Unremarkable for patient's age  PELVIS      REPRODUCTIVE ORGANS:  Unremarkable for patient's age  URINARY BLADDER:  The bladder wall is mildly thickened however it is under distended  ABDOMINAL WALL/INGUINAL REGIONS:  Bilateral fat-containing inguinal hernias  OSSEOUS STRUCTURES:  No acute fracture or destructive osseous lesion  Impression:        1   No acute inflammatory process identified within the abdomen or pelvis  2   Mildly thickened bladder wall which may be due to underdistention   Correlate with urinalysis to exclude infection  3   3 3 cm hyperenhancing mass within the right lobe the liver likely represents a hemangioma   Further evaluation with nonemergent ultrasound may be obtained  Workstation performed: [id number]         Final Result by Deborah Carpenter MD (03/13 0028)      1  No acute inflammatory process identified within the abdomen or pelvis  2   Mildly thickened bladder wall which may be due to underdistention    Correlate with urinalysis to exclude infection  3   3 3 cm hyperenhancing mass within the right lobe the liver likely represents a hemangioma  Further evaluation with nonemergent ultrasound may be obtained  Workstation performed: [id number]         XR chest 2 views   ED Interpretation by Clarissa Louis MD (03/13 0014)   Elevated R hemidiaphragm read by me  Final Result by Bacilio Pearl MD (03/13 0028)      No focal consolidation  Workstation performed: [id number]             EKG, Pathology, and Other Studies Reviewed on Admission:   · EKG:  Sinus tach    Allscripts / Epic Records Reviewed: No     ** Please Note: This note has been constructed using a voice recognition system   **

## 2018-03-14 VITALS
RESPIRATION RATE: 20 BRPM | BODY MASS INDEX: 31.64 KG/M2 | HEIGHT: 63 IN | WEIGHT: 178.57 LBS | OXYGEN SATURATION: 95 % | HEART RATE: 87 BPM | TEMPERATURE: 98.4 F | DIASTOLIC BLOOD PRESSURE: 71 MMHG | SYSTOLIC BLOOD PRESSURE: 143 MMHG

## 2018-03-14 PROBLEM — A41.9 SEVERE SEPSIS (HCC): Status: RESOLVED | Noted: 2018-03-13 | Resolved: 2018-03-14

## 2018-03-14 PROBLEM — R65.20 SEVERE SEPSIS (HCC): Status: RESOLVED | Noted: 2018-03-13 | Resolved: 2018-03-14

## 2018-03-14 LAB
ANION GAP SERPL CALCULATED.3IONS-SCNC: 9 MMOL/L (ref 4–13)
BUN SERPL-MCNC: 4 MG/DL (ref 5–25)
CALCIUM SERPL-MCNC: 8.3 MG/DL (ref 8.3–10.1)
CHLORIDE SERPL-SCNC: 104 MMOL/L (ref 100–108)
CO2 SERPL-SCNC: 28 MMOL/L (ref 21–32)
CREAT SERPL-MCNC: 0.48 MG/DL (ref 0.6–1.3)
GFR SERPL CREATININE-BSD FRML MDRD: 109 ML/MIN/1.73SQ M
GLUCOSE P FAST SERPL-MCNC: 174 MG/DL (ref 65–99)
GLUCOSE SERPL-MCNC: 169 MG/DL (ref 65–140)
GLUCOSE SERPL-MCNC: 174 MG/DL (ref 65–140)
MAGNESIUM SERPL-MCNC: 1.4 MG/DL (ref 1.6–2.6)
POTASSIUM SERPL-SCNC: 2.9 MMOL/L (ref 3.5–5.3)
SODIUM SERPL-SCNC: 141 MMOL/L (ref 136–145)

## 2018-03-14 PROCEDURE — 82948 REAGENT STRIP/BLOOD GLUCOSE: CPT

## 2018-03-14 PROCEDURE — 80048 BASIC METABOLIC PNL TOTAL CA: CPT | Performed by: INTERNAL MEDICINE

## 2018-03-14 PROCEDURE — 99217 PR OBSERVATION CARE DISCHARGE MANAGEMENT: CPT | Performed by: INTERNAL MEDICINE

## 2018-03-14 PROCEDURE — 83735 ASSAY OF MAGNESIUM: CPT | Performed by: INTERNAL MEDICINE

## 2018-03-14 RX ORDER — MAGNESIUM SULFATE HEPTAHYDRATE 40 MG/ML
2 INJECTION, SOLUTION INTRAVENOUS ONCE
Status: COMPLETED | OUTPATIENT
Start: 2018-03-14 | End: 2018-03-14

## 2018-03-14 RX ORDER — POTASSIUM CHLORIDE 20 MEQ/1
40 TABLET, EXTENDED RELEASE ORAL ONCE
Status: COMPLETED | OUTPATIENT
Start: 2018-03-14 | End: 2018-03-14

## 2018-03-14 RX ADMIN — ANTACID TABLETS 1000 MG: 500 TABLET, CHEWABLE ORAL at 05:11

## 2018-03-14 RX ADMIN — INSULIN LISPRO 1 UNITS: 100 INJECTION, SOLUTION INTRAVENOUS; SUBCUTANEOUS at 09:07

## 2018-03-14 RX ADMIN — ACETAMINOPHEN 650 MG: 325 TABLET, FILM COATED ORAL at 05:11

## 2018-03-14 RX ADMIN — SODIUM CHLORIDE 125 ML/HR: 0.9 INJECTION, SOLUTION INTRAVENOUS at 01:12

## 2018-03-14 RX ADMIN — POTASSIUM CHLORIDE 40 MEQ: 1500 TABLET, EXTENDED RELEASE ORAL at 09:00

## 2018-03-14 RX ADMIN — ENOXAPARIN SODIUM 40 MG: 40 INJECTION SUBCUTANEOUS at 09:00

## 2018-03-14 RX ADMIN — MAGNESIUM SULFATE HEPTAHYDRATE 2 G: 40 INJECTION, SOLUTION INTRAVENOUS at 09:01

## 2018-03-14 NOTE — DISCHARGE SUMMARY
Discharge Summary - [facility]    Patient Information: Georgi Kay 63 y o  female MRN: [id number]  Unit/Bed#: -01 Encounter: [id number]    Discharging Physician / Practitioner: Pete Mccain DO  PCP: Wilman Hilario DO  Admission Date: 3/12/2018  Discharge Date: 03/14/18    Disposition:     Home    Reason for Admission:   Severe sepsis likely secondary to viral gastroenteritis    Discharge Diagnoses:     Principal Problem (Resolved):    Severe sepsis ([facility] )  Active Problems:    Gastroenteritis    Type 2 diabetes mellitus without complication, without long-term current use of insulin [facility] )    Gastroesophageal reflux disease without esophagitis      Consultations During Hospital Stay:  · None    Procedures Performed:     · Abdominal pelvis CT scan without acute abnormality  · 3 3 cm right lobe liver mass likely representing hemangioma  · Normal chest x-ray    Significant Findings / Test Results:     · As above    Incidental Findings:   · Liver mass likely hemangioma    Test Results Pending at Discharge (will require follow up):    · Final blood culture     Outpatient Tests Requested:  · Liver ultrasound in 2 weeks    Complications:  none    Hospital Course:     Georgi Kay is a 63 y o  female patient who originally presented to the hospital on 3/12/2018 due to vomiting associated with nausea and some abdominal pain  Patient was evaluated in the emergency room, found afebrile with leukocytosis and lactic acidosis, abdominal and pelvic CT scan with above finding, normal chest x-ray, and unremarkable urinalysis  Patient was admitted to the hospital with possible viral gastroenteritis she was hydrated with IV fluid, electrolytes were replaced,  lactic acidosis has resolved  Currently she is asymptomatic,  tolerating oral diet, she will be discharged home to follow with her  family doctor in 1 week, she was instructed to have right upper quadrant ultrasound to follow on this possible hemangioma  Condition at Discharge: stable     Discharge Day Visit / Exam:     Subjective:    Patient seen and examined  Comfortable in bed  Tolerating oral diet  Vitals: Blood Pressure: 143/71 (03/14/18 0812)  Pulse: 87 (03/14/18 0812)  Temperature: 98 4 °F (36 9 °C) (03/14/18 0812)  Temp Source: Oral (03/14/18 8485)  Respirations: 20 (03/14/18 0812)  Height: 5' 3" (160 cm) (03/13/18 1108)  Weight - Scale: 81 kg (178 lb 9 2 oz) (03/13/18 1108)  SpO2: 95 % (03/14/18 2400)  Exam:   Physical Exam  Patient is awake alert oriented x3 in no acute distress  Lung clear to auscultation bilateral  Heart positive S1-S2 no murmur  Abdomen soft nontender positive bowel sounds  Lower extremities no edema  Discussion with Family:  Patient's niece at bedside    Discharge instructions/Information to patient and family:   See after visit summary for information provided to patient and family  Provisions for Follow-Up Care:  See after visit summary for information related to follow-up care and any pertinent home health orders  Planned Readmission: no      Discharge Statement:  I spent 40  minutes discharging the patient  This time was spent on the day of discharge  I had direct contact with the patient on the day of discharge  Greater than 50% of the total time was spent examining patient, answering all patient questions, arranging and discussing plan of care with patient as well as directly providing post-discharge instructions  Additional time then spent on discharge activities  Discharge Medications:  See after visit summary for reconciled discharge medications provided to patient and family        ** Please Note: This note has been constructed using a voice recognition system **

## 2018-03-14 NOTE — INCIDENTAL FINDINGS
The following findings require follow up:  Radiographic finding   Finding:  Liver mass   Follow up required:  Liver ultrasound   Follow up should be done within 2 week(s)    Please notify the following clinician to assist with the follow up:   Dr Abiodun Morley DO

## 2018-03-18 LAB
BACTERIA BLD CULT: NORMAL
BACTERIA BLD CULT: NORMAL

## 2018-09-28 ENCOUNTER — APPOINTMENT (INPATIENT)
Dept: RADIOLOGY | Facility: HOSPITAL | Age: 58
DRG: 492 | End: 2018-09-28
Payer: COMMERCIAL

## 2018-09-28 ENCOUNTER — HOSPITAL ENCOUNTER (INPATIENT)
Facility: HOSPITAL | Age: 58
LOS: 4 days | DRG: 492 | End: 2018-10-02
Attending: EMERGENCY MEDICINE | Admitting: SURGERY
Payer: COMMERCIAL

## 2018-09-28 ENCOUNTER — ANESTHESIA EVENT (INPATIENT)
Dept: PERIOP | Facility: HOSPITAL | Age: 58
DRG: 492 | End: 2018-09-28
Payer: COMMERCIAL

## 2018-09-28 ENCOUNTER — ANESTHESIA (INPATIENT)
Dept: PERIOP | Facility: HOSPITAL | Age: 58
DRG: 492 | End: 2018-09-28
Payer: COMMERCIAL

## 2018-09-28 ENCOUNTER — APPOINTMENT (EMERGENCY)
Dept: RADIOLOGY | Facility: HOSPITAL | Age: 58
DRG: 492 | End: 2018-09-28
Payer: COMMERCIAL

## 2018-09-28 DIAGNOSIS — S06.5X9A SUBDURAL HEMATOMA (HCC): ICD-10-CM

## 2018-09-28 DIAGNOSIS — S82.409A FIBULA FRACTURE: ICD-10-CM

## 2018-09-28 DIAGNOSIS — S82.251C TYPE III OPEN DISPLACED COMMINUTED FRACTURE OF SHAFT OF RIGHT TIBIA, INITIAL ENCOUNTER: ICD-10-CM

## 2018-09-28 DIAGNOSIS — Z98.890 S/P ORIF (OPEN REDUCTION INTERNAL FIXATION) FRACTURE: Primary | ICD-10-CM

## 2018-09-28 DIAGNOSIS — S22.009A CLOSED FRACTURE OF THORACIC VERTEBRAL BODY (HCC): ICD-10-CM

## 2018-09-28 DIAGNOSIS — I60.9 SUBARACHNOID BLEED (HCC): ICD-10-CM

## 2018-09-28 DIAGNOSIS — Z87.81 S/P ORIF (OPEN REDUCTION INTERNAL FIXATION) FRACTURE: Primary | ICD-10-CM

## 2018-09-28 DIAGNOSIS — S32.009A CLOSED FRACTURE OF LUMBAR VERTEBRAL BODY (HCC): ICD-10-CM

## 2018-09-28 DIAGNOSIS — S82.209A TIBIA FRACTURE: ICD-10-CM

## 2018-09-28 PROBLEM — S22.42XA CLOSED FRACTURE OF MULTIPLE RIBS OF LEFT SIDE: Status: ACTIVE | Noted: 2018-09-28

## 2018-09-28 PROBLEM — T14.8XXA SUBCUTANEOUS HEMATOMA: Status: ACTIVE | Noted: 2018-09-28

## 2018-09-28 PROBLEM — S82.251B OPEN DISPLACED COMMINUTED FRACTURE OF SHAFT OF RIGHT TIBIA: Status: ACTIVE | Noted: 2018-09-28

## 2018-09-28 PROBLEM — T14.8XXA TRAUMATIC ECCHYMOSIS OF MULTIPLE SITES: Status: ACTIVE | Noted: 2018-09-28

## 2018-09-28 PROBLEM — E87.6 HYPOKALEMIA: Status: ACTIVE | Noted: 2018-09-28
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
## 2018-09-28 PROBLEM — S82.451B: Status: ACTIVE | Noted: 2018-09-28

## 2018-09-28 LAB
ABO GROUP BLD: NORMAL
ALBUMIN SERPL BCP-MCNC: 3.5 G/DL (ref 3.5–5)
ALP SERPL-CCNC: 120 U/L (ref 46–116)
ALT SERPL W P-5'-P-CCNC: 42 U/L (ref 12–78)
ANION GAP SERPL CALCULATED.3IONS-SCNC: 10 MMOL/L (ref 4–13)
ANION GAP SERPL CALCULATED.3IONS-SCNC: 8 MMOL/L (ref 4–13)
APTT PPP: 24 SECONDS (ref 24–36)
APTT PPP: 27 SECONDS (ref 24–36)
AST SERPL W P-5'-P-CCNC: 72 U/L (ref 5–45)
ATRIAL RATE: 123 BPM
BASE EXCESS BLDA CALC-SCNC: -1 MMOL/L (ref -2–3)
BASOPHILS # BLD AUTO: 0.01 THOUSANDS/ΜL (ref 0–0.1)
BASOPHILS # BLD AUTO: 0.02 THOUSANDS/ΜL (ref 0–0.1)
BASOPHILS # BLD AUTO: 0.05 THOUSANDS/ΜL (ref 0–0.1)
BASOPHILS NFR BLD AUTO: 0 % (ref 0–1)
BILIRUB SERPL-MCNC: 0.44 MG/DL (ref 0.2–1)
BLD GP AB SCN SERPL QL: NEGATIVE
BUN SERPL-MCNC: 11 MG/DL (ref 5–25)
BUN SERPL-MCNC: 9 MG/DL (ref 5–25)
CA-I BLD-SCNC: 1.17 MMOL/L (ref 1.12–1.32)
CALCIUM SERPL-MCNC: 7.9 MG/DL (ref 8.3–10.1)
CALCIUM SERPL-MCNC: 8.4 MG/DL (ref 8.3–10.1)
CHLORIDE SERPL-SCNC: 100 MMOL/L (ref 100–108)
CHLORIDE SERPL-SCNC: 104 MMOL/L (ref 100–108)
CK MB SERPL-MCNC: 14.1 NG/ML (ref 0–5)
CK MB SERPL-MCNC: <1 % (ref 0–2.5)
CK SERPL-CCNC: 2800 U/L (ref 26–192)
CO2 SERPL-SCNC: 25 MMOL/L (ref 21–32)
CO2 SERPL-SCNC: 26 MMOL/L (ref 21–32)
CREAT SERPL-MCNC: 0.7 MG/DL (ref 0.6–1.3)
CREAT SERPL-MCNC: 0.75 MG/DL (ref 0.6–1.3)
EOSINOPHIL # BLD AUTO: 0 THOUSAND/ΜL (ref 0–0.61)
EOSINOPHIL # BLD AUTO: 0.05 THOUSAND/ΜL (ref 0–0.61)
EOSINOPHIL NFR BLD AUTO: 0 % (ref 0–6)
ERYTHROCYTE [DISTWIDTH] IN BLOOD BY AUTOMATED COUNT: 12.9 % (ref 11.6–15.1)
ERYTHROCYTE [DISTWIDTH] IN BLOOD BY AUTOMATED COUNT: 13.2 % (ref 11.6–15.1)
EST. AVERAGE GLUCOSE BLD GHB EST-MCNC: 220 MG/DL
GFR SERPL CREATININE-BSD FRML MDRD: 89 ML/MIN/1.73SQ M
GFR SERPL CREATININE-BSD FRML MDRD: 96 ML/MIN/1.73SQ M
GLUCOSE SERPL-MCNC: 202 MG/DL (ref 65–140)
GLUCOSE SERPL-MCNC: 250 MG/DL (ref 65–140)
GLUCOSE SERPL-MCNC: 285 MG/DL (ref 65–140)
GLUCOSE SERPL-MCNC: 290 MG/DL (ref 65–140)
GLUCOSE SERPL-MCNC: 323 MG/DL (ref 65–140)
GLUCOSE SERPL-MCNC: 344 MG/DL (ref 65–140)
GLUCOSE SERPL-MCNC: 356 MG/DL (ref 65–140)
HBA1C MFR BLD: 9.3 % (ref 4.2–6.3)
HCO3 BLDA-SCNC: 26.1 MMOL/L (ref 24–30)
HCT VFR BLD AUTO: 26.8 % (ref 34.8–46.1)
HCT VFR BLD AUTO: 29.7 % (ref 34.8–46.1)
HCT VFR BLD AUTO: 33.2 % (ref 34.8–46.1)
HCT VFR BLD AUTO: 33.7 % (ref 34.8–46.1)
HCT VFR BLD AUTO: 37.2 % (ref 34.8–46.1)
HCT VFR BLD CALC: 36 % (ref 34.8–46.1)
HGB BLD-MCNC: 10.7 G/DL (ref 11.5–15.4)
HGB BLD-MCNC: 11.3 G/DL (ref 11.5–15.4)
HGB BLD-MCNC: 12.3 G/DL (ref 11.5–15.4)
HGB BLD-MCNC: 9.1 G/DL (ref 11.5–15.4)
HGB BLD-MCNC: 9.7 G/DL (ref 11.5–15.4)
HGB BLDA-MCNC: 12.2 G/DL (ref 11.5–15.4)
IMM GRANULOCYTES # BLD AUTO: 0.03 THOUSAND/UL (ref 0–0.2)
IMM GRANULOCYTES # BLD AUTO: 0.04 THOUSAND/UL (ref 0–0.2)
IMM GRANULOCYTES # BLD AUTO: 0.06 THOUSAND/UL (ref 0–0.2)
IMM GRANULOCYTES # BLD AUTO: 0.09 THOUSAND/UL (ref 0–0.2)
IMM GRANULOCYTES # BLD AUTO: 0.44 THOUSAND/UL (ref 0–0.2)
IMM GRANULOCYTES NFR BLD AUTO: 0 % (ref 0–2)
IMM GRANULOCYTES NFR BLD AUTO: 0 % (ref 0–2)
IMM GRANULOCYTES NFR BLD AUTO: 1 % (ref 0–2)
IMM GRANULOCYTES NFR BLD AUTO: 1 % (ref 0–2)
IMM GRANULOCYTES NFR BLD AUTO: 2 % (ref 0–2)
INR PPP: 1.06 (ref 0.86–1.17)
INR PPP: 1.08 (ref 0.86–1.17)
LYMPHOCYTES # BLD AUTO: 0.83 THOUSANDS/ΜL (ref 0.6–4.47)
LYMPHOCYTES # BLD AUTO: 1.18 THOUSANDS/ΜL (ref 0.6–4.47)
LYMPHOCYTES # BLD AUTO: 1.31 THOUSANDS/ΜL (ref 0.6–4.47)
LYMPHOCYTES # BLD AUTO: 1.8 THOUSANDS/ΜL (ref 0.6–4.47)
LYMPHOCYTES # BLD AUTO: 5.24 THOUSANDS/ΜL (ref 0.6–4.47)
LYMPHOCYTES NFR BLD AUTO: 11 % (ref 14–44)
LYMPHOCYTES NFR BLD AUTO: 16 % (ref 14–44)
LYMPHOCYTES NFR BLD AUTO: 18 % (ref 14–44)
LYMPHOCYTES NFR BLD AUTO: 29 % (ref 14–44)
LYMPHOCYTES NFR BLD AUTO: 7 % (ref 14–44)
MCH RBC QN AUTO: 28.4 PG (ref 26.8–34.3)
MCH RBC QN AUTO: 28.4 PG (ref 26.8–34.3)
MCH RBC QN AUTO: 28.5 PG (ref 26.8–34.3)
MCH RBC QN AUTO: 28.7 PG (ref 26.8–34.3)
MCH RBC QN AUTO: 29 PG (ref 26.8–34.3)
MCHC RBC AUTO-ENTMCNC: 32.2 G/DL (ref 31.4–37.4)
MCHC RBC AUTO-ENTMCNC: 32.7 G/DL (ref 31.4–37.4)
MCHC RBC AUTO-ENTMCNC: 33.1 G/DL (ref 31.4–37.4)
MCHC RBC AUTO-ENTMCNC: 33.5 G/DL (ref 31.4–37.4)
MCHC RBC AUTO-ENTMCNC: 34 G/DL (ref 31.4–37.4)
MCV RBC AUTO: 85 FL (ref 82–98)
MCV RBC AUTO: 86 FL (ref 82–98)
MCV RBC AUTO: 86 FL (ref 82–98)
MCV RBC AUTO: 87 FL (ref 82–98)
MCV RBC AUTO: 88 FL (ref 82–98)
MONOCYTES # BLD AUTO: 0.33 THOUSAND/ΜL (ref 0.17–1.22)
MONOCYTES # BLD AUTO: 0.46 THOUSAND/ΜL (ref 0.17–1.22)
MONOCYTES # BLD AUTO: 0.47 THOUSAND/ΜL (ref 0.17–1.22)
MONOCYTES # BLD AUTO: 0.6 THOUSAND/ΜL (ref 0.17–1.22)
MONOCYTES # BLD AUTO: 0.64 THOUSAND/ΜL (ref 0.17–1.22)
MONOCYTES NFR BLD AUTO: 4 % (ref 4–12)
MONOCYTES NFR BLD AUTO: 4 % (ref 4–12)
MONOCYTES NFR BLD AUTO: 5 % (ref 4–12)
NEUTROPHILS # BLD AUTO: 11.89 THOUSANDS/ΜL (ref 1.85–7.62)
NEUTROPHILS # BLD AUTO: 6.72 THOUSANDS/ΜL (ref 1.85–7.62)
NEUTROPHILS # BLD AUTO: 7.85 THOUSANDS/ΜL (ref 1.85–7.62)
NEUTROPHILS # BLD AUTO: 8.77 THOUSANDS/ΜL (ref 1.85–7.62)
NEUTROPHILS # BLD AUTO: 9.89 THOUSANDS/ΜL (ref 1.85–7.62)
NEUTS SEG NFR BLD AUTO: 65 % (ref 43–75)
NEUTS SEG NFR BLD AUTO: 77 % (ref 43–75)
NEUTS SEG NFR BLD AUTO: 80 % (ref 43–75)
NEUTS SEG NFR BLD AUTO: 83 % (ref 43–75)
NEUTS SEG NFR BLD AUTO: 87 % (ref 43–75)
NRBC BLD AUTO-RTO: 0 /100 WBCS
P AXIS: 65 DEGREES
PCO2 BLD: 28 MMOL/L (ref 21–32)
PCO2 BLD: 52.8 MM HG (ref 42–50)
PH BLD: 7.3 [PH] (ref 7.3–7.4)
PLATELET # BLD AUTO: 138 THOUSANDS/UL (ref 149–390)
PLATELET # BLD AUTO: 156 THOUSANDS/UL (ref 149–390)
PLATELET # BLD AUTO: 157 THOUSANDS/UL (ref 149–390)
PLATELET # BLD AUTO: 184 THOUSANDS/UL (ref 149–390)
PLATELET # BLD AUTO: 186 THOUSANDS/UL (ref 149–390)
PMV BLD AUTO: 11.4 FL (ref 8.9–12.7)
PMV BLD AUTO: 11.5 FL (ref 8.9–12.7)
PMV BLD AUTO: 11.9 FL (ref 8.9–12.7)
PMV BLD AUTO: 12 FL (ref 8.9–12.7)
PMV BLD AUTO: 12.1 FL (ref 8.9–12.7)
PO2 BLD: 24 MM HG (ref 35–45)
POTASSIUM BLD-SCNC: 3.2 MMOL/L (ref 3.5–5.3)
POTASSIUM SERPL-SCNC: 3.2 MMOL/L (ref 3.5–5.3)
POTASSIUM SERPL-SCNC: 4.4 MMOL/L (ref 3.5–5.3)
PR INTERVAL: 158 MS
PROT SERPL-MCNC: 7.6 G/DL (ref 6.4–8.2)
PROTHROMBIN TIME: 13.9 SECONDS (ref 11.8–14.2)
PROTHROMBIN TIME: 14.1 SECONDS (ref 11.8–14.2)
QRS AXIS: 108 DEGREES
QRSD INTERVAL: 76 MS
QT INTERVAL: 286 MS
QTC INTERVAL: 409 MS
RBC # BLD AUTO: 3.14 MILLION/UL (ref 3.81–5.12)
RBC # BLD AUTO: 3.42 MILLION/UL (ref 3.81–5.12)
RBC # BLD AUTO: 3.77 MILLION/UL (ref 3.81–5.12)
RBC # BLD AUTO: 3.94 MILLION/UL (ref 3.81–5.12)
RBC # BLD AUTO: 4.32 MILLION/UL (ref 3.81–5.12)
RH BLD: POSITIVE
SAO2 % BLD FROM PO2: 36 % (ref 95–98)
SODIUM BLD-SCNC: 139 MMOL/L (ref 136–145)
SODIUM SERPL-SCNC: 136 MMOL/L (ref 136–145)
SODIUM SERPL-SCNC: 137 MMOL/L (ref 136–145)
SPECIMEN EXPIRATION DATE: NORMAL
SPECIMEN SOURCE: ABNORMAL
T WAVE AXIS: 46 DEGREES
VENTRICULAR RATE: 123 BPM
WBC # BLD AUTO: 10.17 THOUSAND/UL (ref 4.31–10.16)
WBC # BLD AUTO: 10.5 THOUSAND/UL (ref 4.31–10.16)
WBC # BLD AUTO: 11.43 THOUSAND/UL (ref 4.31–10.16)
WBC # BLD AUTO: 18.31 THOUSAND/UL (ref 4.31–10.16)
WBC # BLD AUTO: 8.4 THOUSAND/UL (ref 4.31–10.16)

## 2018-09-28 PROCEDURE — 99291 CRITICAL CARE FIRST HOUR: CPT | Performed by: SURGERY

## 2018-09-28 PROCEDURE — 86850 RBC ANTIBODY SCREEN: CPT | Performed by: EMERGENCY MEDICINE

## 2018-09-28 PROCEDURE — C1713 ANCHOR/SCREW BN/BN,TIS/BN: HCPCS | Performed by: ORTHOPAEDIC SURGERY

## 2018-09-28 PROCEDURE — 27766 OPTX MEDIAL ANKLE FX: CPT | Performed by: ORTHOPAEDIC SURGERY

## 2018-09-28 PROCEDURE — 85014 HEMATOCRIT: CPT

## 2018-09-28 PROCEDURE — 27759 TREATMENT OF TIBIA FRACTURE: CPT | Performed by: ORTHOPAEDIC SURGERY

## 2018-09-28 PROCEDURE — 99223 1ST HOSP IP/OBS HIGH 75: CPT | Performed by: ORTHOPAEDIC SURGERY

## 2018-09-28 PROCEDURE — 85025 COMPLETE CBC W/AUTO DIFF WBC: CPT | Performed by: EMERGENCY MEDICINE

## 2018-09-28 PROCEDURE — 85730 THROMBOPLASTIN TIME PARTIAL: CPT | Performed by: EMERGENCY MEDICINE

## 2018-09-28 PROCEDURE — 70450 CT HEAD/BRAIN W/O DYE: CPT

## 2018-09-28 PROCEDURE — 73590 X-RAY EXAM OF LOWER LEG: CPT

## 2018-09-28 PROCEDURE — 83036 HEMOGLOBIN GLYCOSYLATED A1C: CPT | Performed by: EMERGENCY MEDICINE

## 2018-09-28 PROCEDURE — 96361 HYDRATE IV INFUSION ADD-ON: CPT

## 2018-09-28 PROCEDURE — 82330 ASSAY OF CALCIUM: CPT

## 2018-09-28 PROCEDURE — 99285 EMERGENCY DEPT VISIT HI MDM: CPT

## 2018-09-28 PROCEDURE — 0QSGXZZ REPOSITION RIGHT TIBIA, EXTERNAL APPROACH: ICD-10-PCS | Performed by: ORTHOPAEDIC SURGERY

## 2018-09-28 PROCEDURE — 90471 IMMUNIZATION ADMIN: CPT

## 2018-09-28 PROCEDURE — 74177 CT ABD & PELVIS W/CONTRAST: CPT

## 2018-09-28 PROCEDURE — 73700 CT LOWER EXTREMITY W/O DYE: CPT

## 2018-09-28 PROCEDURE — 94760 N-INVAS EAR/PLS OXIMETRY 1: CPT

## 2018-09-28 PROCEDURE — 99223 1ST HOSP IP/OBS HIGH 75: CPT | Performed by: NEUROLOGICAL SURGERY

## 2018-09-28 PROCEDURE — 0JDN0ZZ EXTRACTION OF RIGHT LOWER LEG SUBCUTANEOUS TISSUE AND FASCIA, OPEN APPROACH: ICD-10-PCS | Performed by: ORTHOPAEDIC SURGERY

## 2018-09-28 PROCEDURE — 86900 BLOOD TYPING SEROLOGIC ABO: CPT | Performed by: EMERGENCY MEDICINE

## 2018-09-28 PROCEDURE — 80048 BASIC METABOLIC PNL TOTAL CA: CPT | Performed by: EMERGENCY MEDICINE

## 2018-09-28 PROCEDURE — 82948 REAGENT STRIP/BLOOD GLUCOSE: CPT

## 2018-09-28 PROCEDURE — 80053 COMPREHEN METABOLIC PANEL: CPT | Performed by: EMERGENCY MEDICINE

## 2018-09-28 PROCEDURE — 0JCN0ZZ EXTIRPATION OF MATTER FROM RIGHT LOWER LEG SUBCUTANEOUS TISSUE AND FASCIA, OPEN APPROACH: ICD-10-PCS | Performed by: ORTHOPAEDIC SURGERY

## 2018-09-28 PROCEDURE — 96376 TX/PRO/DX INJ SAME DRUG ADON: CPT

## 2018-09-28 PROCEDURE — 93005 ELECTROCARDIOGRAM TRACING: CPT

## 2018-09-28 PROCEDURE — C1769 GUIDE WIRE: HCPCS | Performed by: ORTHOPAEDIC SURGERY

## 2018-09-28 PROCEDURE — 82553 CREATINE MB FRACTION: CPT | Performed by: PHYSICIAN ASSISTANT

## 2018-09-28 PROCEDURE — 86901 BLOOD TYPING SEROLOGIC RH(D): CPT | Performed by: EMERGENCY MEDICINE

## 2018-09-28 PROCEDURE — 82947 ASSAY GLUCOSE BLOOD QUANT: CPT

## 2018-09-28 PROCEDURE — 96375 TX/PRO/DX INJ NEW DRUG ADDON: CPT

## 2018-09-28 PROCEDURE — 90715 TDAP VACCINE 7 YRS/> IM: CPT | Performed by: EMERGENCY MEDICINE

## 2018-09-28 PROCEDURE — 0QSG06Z REPOSITION RIGHT TIBIA WITH INTRAMEDULLARY INTERNAL FIXATION DEVICE, OPEN APPROACH: ICD-10-PCS | Performed by: ORTHOPAEDIC SURGERY

## 2018-09-28 PROCEDURE — 99292 CRITICAL CARE ADDL 30 MIN: CPT | Performed by: SURGERY

## 2018-09-28 PROCEDURE — 11012 DEB SKIN BONE AT FX SITE: CPT | Performed by: ORTHOPAEDIC SURGERY

## 2018-09-28 PROCEDURE — 71260 CT THORAX DX C+: CPT

## 2018-09-28 PROCEDURE — 36415 COLL VENOUS BLD VENIPUNCTURE: CPT | Performed by: SURGERY

## 2018-09-28 PROCEDURE — 72125 CT NECK SPINE W/O DYE: CPT

## 2018-09-28 PROCEDURE — 96365 THER/PROPH/DIAG IV INF INIT: CPT

## 2018-09-28 PROCEDURE — 82803 BLOOD GASES ANY COMBINATION: CPT

## 2018-09-28 PROCEDURE — 84132 ASSAY OF SERUM POTASSIUM: CPT

## 2018-09-28 PROCEDURE — 82550 ASSAY OF CK (CPK): CPT | Performed by: PHYSICIAN ASSISTANT

## 2018-09-28 PROCEDURE — 85610 PROTHROMBIN TIME: CPT | Performed by: EMERGENCY MEDICINE

## 2018-09-28 PROCEDURE — 2W3QX1Z IMMOBILIZATION OF RIGHT LOWER LEG USING SPLINT: ICD-10-PCS | Performed by: ORTHOPAEDIC SURGERY

## 2018-09-28 PROCEDURE — 84295 ASSAY OF SERUM SODIUM: CPT

## 2018-09-28 DEVICE — 10MM TI CANN TIBIAL NAIL-EX W/PROX BEND 315MM-STERILE
Type: IMPLANTABLE DEVICE | Site: TIBIA | Status: FUNCTIONAL
Brand: EXPERT NAIL

## 2018-09-28 DEVICE — 5.0MM TI LOCKING SCREW W/T25 STARDRIVE 42MM F/IM NAIL-STER: Type: IMPLANTABLE DEVICE | Site: TIBIA | Status: FUNCTIONAL

## 2018-09-28 DEVICE — 5.0MM TI LOCKING SCREW W/T25 STARDRIVE 36MM F/IM NAIL-STER: Type: IMPLANTABLE DEVICE | Site: TIBIA | Status: FUNCTIONAL

## 2018-09-28 DEVICE — 5.0MM TI LOCKING SCREW W/T25 STARDRIVE 30MM F/IM NAIL-STER: Type: IMPLANTABLE DEVICE | Site: TIBIA | Status: FUNCTIONAL

## 2018-09-28 DEVICE — 4.0MM CANNULATED SCREW LONG THREAD/38MM: Type: IMPLANTABLE DEVICE | Site: ANKLE | Status: FUNCTIONAL

## 2018-09-28 DEVICE — 5.0MM TI LOCKING SCREW W/T25 STARDRIVE 38MM F/IM NAIL-STER: Type: IMPLANTABLE DEVICE | Site: TIBIA | Status: FUNCTIONAL

## 2018-09-28 RX ORDER — MAGNESIUM HYDROXIDE 1200 MG/15ML
LIQUID ORAL AS NEEDED
Status: DISCONTINUED | OUTPATIENT
Start: 2018-09-28 | End: 2018-09-28 | Stop reason: HOSPADM

## 2018-09-28 RX ORDER — TRAMADOL HYDROCHLORIDE 50 MG/1
50 TABLET ORAL EVERY 6 HOURS PRN
Qty: 30 TABLET | Refills: 0 | Status: SHIPPED | OUTPATIENT
Start: 2018-09-28 | End: 2018-10-15 | Stop reason: HOSPADM

## 2018-09-28 RX ORDER — FENTANYL CITRATE 50 UG/ML
INJECTION, SOLUTION INTRAMUSCULAR; INTRAVENOUS CODE/TRAUMA/SEDATION MEDICATION
Status: COMPLETED | OUTPATIENT
Start: 2018-09-28 | End: 2018-09-28

## 2018-09-28 RX ORDER — SODIUM CHLORIDE 9 MG/ML
125 INJECTION, SOLUTION INTRAVENOUS CONTINUOUS
Status: DISCONTINUED | OUTPATIENT
Start: 2018-09-28 | End: 2018-09-28

## 2018-09-28 RX ORDER — ALBUTEROL SULFATE 2.5 MG/3ML
2.5 SOLUTION RESPIRATORY (INHALATION) ONCE AS NEEDED
Status: DISCONTINUED | OUTPATIENT
Start: 2018-09-28 | End: 2018-09-28 | Stop reason: HOSPADM

## 2018-09-28 RX ORDER — POTASSIUM CHLORIDE 14.9 MG/ML
20 INJECTION INTRAVENOUS
Status: COMPLETED | OUTPATIENT
Start: 2018-09-28 | End: 2018-09-28

## 2018-09-28 RX ORDER — LEVETIRACETAM 500 MG/1
500 TABLET ORAL EVERY 12 HOURS SCHEDULED
Status: DISCONTINUED | OUTPATIENT
Start: 2018-09-29 | End: 2018-09-28

## 2018-09-28 RX ORDER — HYDROMORPHONE HCL/PF 1 MG/ML
1 SYRINGE (ML) INJECTION ONCE
Status: COMPLETED | OUTPATIENT
Start: 2018-09-28 | End: 2018-09-28

## 2018-09-28 RX ORDER — ONDANSETRON 2 MG/ML
4 INJECTION INTRAMUSCULAR; INTRAVENOUS ONCE AS NEEDED
Status: DISCONTINUED | OUTPATIENT
Start: 2018-09-28 | End: 2018-09-28 | Stop reason: HOSPADM

## 2018-09-28 RX ORDER — HYDROMORPHONE HCL/PF 1 MG/ML
1 SYRINGE (ML) INJECTION EVERY 4 HOURS PRN
Status: DISCONTINUED | OUTPATIENT
Start: 2018-09-28 | End: 2018-09-30

## 2018-09-28 RX ORDER — FAMOTIDINE 20 MG/1
20 TABLET, FILM COATED ORAL 2 TIMES DAILY
Status: DISCONTINUED | OUTPATIENT
Start: 2018-09-28 | End: 2018-09-29

## 2018-09-28 RX ORDER — HYDROMORPHONE HCL/PF 1 MG/ML
0.5 SYRINGE (ML) INJECTION
Status: DISCONTINUED | OUTPATIENT
Start: 2018-09-28 | End: 2018-09-28 | Stop reason: HOSPADM

## 2018-09-28 RX ORDER — LIDOCAINE HYDROCHLORIDE 10 MG/ML
20 INJECTION, SOLUTION EPIDURAL; INFILTRATION; INTRACAUDAL; PERINEURAL ONCE
Status: COMPLETED | OUTPATIENT
Start: 2018-09-28 | End: 2018-09-28

## 2018-09-28 RX ORDER — PROPOFOL 10 MG/ML
INJECTION, EMULSION INTRAVENOUS AS NEEDED
Status: DISCONTINUED | OUTPATIENT
Start: 2018-09-28 | End: 2018-09-28 | Stop reason: SURG

## 2018-09-28 RX ORDER — OXYCODONE HYDROCHLORIDE 5 MG/1
5 TABLET ORAL EVERY 4 HOURS PRN
Status: DISCONTINUED | OUTPATIENT
Start: 2018-09-28 | End: 2018-10-02 | Stop reason: HOSPADM

## 2018-09-28 RX ORDER — SODIUM CHLORIDE, SODIUM LACTATE, POTASSIUM CHLORIDE, CALCIUM CHLORIDE 600; 310; 30; 20 MG/100ML; MG/100ML; MG/100ML; MG/100ML
INJECTION, SOLUTION INTRAVENOUS CONTINUOUS PRN
Status: DISCONTINUED | OUTPATIENT
Start: 2018-09-28 | End: 2018-09-28 | Stop reason: SURG

## 2018-09-28 RX ORDER — MEPERIDINE HYDROCHLORIDE 25 MG/ML
12.5 INJECTION INTRAMUSCULAR; INTRAVENOUS; SUBCUTANEOUS AS NEEDED
Status: DISCONTINUED | OUTPATIENT
Start: 2018-09-28 | End: 2018-09-28 | Stop reason: HOSPADM

## 2018-09-28 RX ORDER — LIDOCAINE HYDROCHLORIDE 10 MG/ML
INJECTION, SOLUTION INFILTRATION; PERINEURAL AS NEEDED
Status: DISCONTINUED | OUTPATIENT
Start: 2018-09-28 | End: 2018-09-28 | Stop reason: SURG

## 2018-09-28 RX ORDER — OXYCODONE HYDROCHLORIDE 5 MG/1
TABLET ORAL
Qty: 30 TABLET | Refills: 0 | Status: SHIPPED | OUTPATIENT
Start: 2018-09-28 | End: 2018-10-15 | Stop reason: HOSPADM

## 2018-09-28 RX ORDER — SODIUM CHLORIDE 9 MG/ML
INJECTION, SOLUTION INTRAVENOUS CONTINUOUS PRN
Status: DISCONTINUED | OUTPATIENT
Start: 2018-09-28 | End: 2018-09-28

## 2018-09-28 RX ORDER — ONDANSETRON 2 MG/ML
4 INJECTION INTRAMUSCULAR; INTRAVENOUS EVERY 4 HOURS PRN
Status: DISCONTINUED | OUTPATIENT
Start: 2018-09-28 | End: 2018-10-02 | Stop reason: HOSPADM

## 2018-09-28 RX ORDER — SUCCINYLCHOLINE/SOD CL,ISO/PF 100 MG/5ML
SYRINGE (ML) INTRAVENOUS AS NEEDED
Status: DISCONTINUED | OUTPATIENT
Start: 2018-09-28 | End: 2018-09-28 | Stop reason: SURG

## 2018-09-28 RX ORDER — FENTANYL CITRATE/PF 50 MCG/ML
25 SYRINGE (ML) INJECTION
Status: DISCONTINUED | OUTPATIENT
Start: 2018-09-28 | End: 2018-09-28 | Stop reason: HOSPADM

## 2018-09-28 RX ORDER — FENTANYL CITRATE 50 UG/ML
INJECTION, SOLUTION INTRAMUSCULAR; INTRAVENOUS AS NEEDED
Status: DISCONTINUED | OUTPATIENT
Start: 2018-09-28 | End: 2018-09-28 | Stop reason: SURG

## 2018-09-28 RX ORDER — ONDANSETRON 2 MG/ML
INJECTION INTRAMUSCULAR; INTRAVENOUS AS NEEDED
Status: DISCONTINUED | OUTPATIENT
Start: 2018-09-28 | End: 2018-09-28 | Stop reason: SURG

## 2018-09-28 RX ORDER — GABAPENTIN 100 MG/1
100 CAPSULE ORAL 3 TIMES DAILY
Status: DISCONTINUED | OUTPATIENT
Start: 2018-09-28 | End: 2018-10-02 | Stop reason: HOSPADM

## 2018-09-28 RX ORDER — METHOCARBAMOL 500 MG/1
500 TABLET, FILM COATED ORAL EVERY 6 HOURS SCHEDULED
Status: DISCONTINUED | OUTPATIENT
Start: 2018-09-28 | End: 2018-10-02 | Stop reason: HOSPADM

## 2018-09-28 RX ORDER — ACETAMINOPHEN 160 MG/5ML
650 SUSPENSION, ORAL (FINAL DOSE FORM) ORAL EVERY 4 HOURS PRN
Status: DISCONTINUED | OUTPATIENT
Start: 2018-09-28 | End: 2018-10-02 | Stop reason: HOSPADM

## 2018-09-28 RX ORDER — SODIUM CHLORIDE 9 MG/ML
125 INJECTION, SOLUTION INTRAVENOUS CONTINUOUS
Status: DISCONTINUED | OUTPATIENT
Start: 2018-09-28 | End: 2018-09-29

## 2018-09-28 RX ORDER — LEVETIRACETAM 500 MG/1
500 TABLET ORAL EVERY 12 HOURS SCHEDULED
Status: DISCONTINUED | OUTPATIENT
Start: 2018-09-28 | End: 2018-10-02 | Stop reason: HOSPADM

## 2018-09-28 RX ORDER — OXYCODONE HYDROCHLORIDE 10 MG/1
10 TABLET ORAL EVERY 4 HOURS PRN
Status: DISCONTINUED | OUTPATIENT
Start: 2018-09-28 | End: 2018-10-02 | Stop reason: HOSPADM

## 2018-09-28 RX ORDER — HYDROMORPHONE HCL/PF 1 MG/ML
SYRINGE (ML) INJECTION AS NEEDED
Status: DISCONTINUED | OUTPATIENT
Start: 2018-09-28 | End: 2018-09-28 | Stop reason: SURG

## 2018-09-28 RX ORDER — AMOXICILLIN 250 MG
2 CAPSULE ORAL DAILY
Status: DISCONTINUED | OUTPATIENT
Start: 2018-09-28 | End: 2018-10-02 | Stop reason: HOSPADM

## 2018-09-28 RX ADMIN — SODIUM CHLORIDE 1000 ML: 0.9 INJECTION, SOLUTION INTRAVENOUS at 03:40

## 2018-09-28 RX ADMIN — CEFAZOLIN SODIUM 1000 MG: 1 SOLUTION INTRAVENOUS at 03:41

## 2018-09-28 RX ADMIN — CEFAZOLIN SODIUM 2000 MG: 2 SOLUTION INTRAVENOUS at 09:17

## 2018-09-28 RX ADMIN — HYDROMORPHONE HYDROCHLORIDE 1 MG: 1 INJECTION, SOLUTION INTRAMUSCULAR; INTRAVENOUS; SUBCUTANEOUS at 04:03

## 2018-09-28 RX ADMIN — LIDOCAINE HYDROCHLORIDE 50 MG: 10 INJECTION, SOLUTION INFILTRATION; PERINEURAL at 09:13

## 2018-09-28 RX ADMIN — POTASSIUM CHLORIDE 20 MEQ: 200 INJECTION, SOLUTION INTRAVENOUS at 08:01

## 2018-09-28 RX ADMIN — ONDANSETRON 4 MG: 2 INJECTION INTRAMUSCULAR; INTRAVENOUS at 19:50

## 2018-09-28 RX ADMIN — FAMOTIDINE 20 MG: 10 INJECTION, SOLUTION INTRAVENOUS at 18:40

## 2018-09-28 RX ADMIN — SODIUM CHLORIDE, SODIUM LACTATE, POTASSIUM CHLORIDE, AND CALCIUM CHLORIDE: .6; .31; .03; .02 INJECTION, SOLUTION INTRAVENOUS at 09:09

## 2018-09-28 RX ADMIN — SODIUM CHLORIDE 125 ML/HR: 0.9 INJECTION, SOLUTION INTRAVENOUS at 20:06

## 2018-09-28 RX ADMIN — FENTANYL CITRATE 50 MCG: 50 INJECTION, SOLUTION INTRAMUSCULAR; INTRAVENOUS at 03:28

## 2018-09-28 RX ADMIN — FENTANYL CITRATE 50 MCG: 50 INJECTION, SOLUTION INTRAMUSCULAR; INTRAVENOUS at 03:36

## 2018-09-28 RX ADMIN — LEVETIRACETAM 1500 MG: 100 INJECTION, SOLUTION INTRAVENOUS at 04:33

## 2018-09-28 RX ADMIN — CEFAZOLIN SODIUM 2000 MG: 2 SOLUTION INTRAVENOUS at 17:39

## 2018-09-28 RX ADMIN — TETANUS TOXOID, REDUCED DIPHTHERIA TOXOID AND ACELLULAR PERTUSSIS VACCINE, ADSORBED 0.5 ML: 5; 2.5; 8; 8; 2.5 SUSPENSION INTRAMUSCULAR at 04:05

## 2018-09-28 RX ADMIN — FENTANYL CITRATE 50 MCG: 50 INJECTION, SOLUTION INTRAMUSCULAR; INTRAVENOUS at 09:53

## 2018-09-28 RX ADMIN — PROPOFOL 150 MG: 10 INJECTION, EMULSION INTRAVENOUS at 09:13

## 2018-09-28 RX ADMIN — ONDANSETRON 4 MG: 2 INJECTION INTRAMUSCULAR; INTRAVENOUS at 15:48

## 2018-09-28 RX ADMIN — SODIUM CHLORIDE: 0.9 INJECTION, SOLUTION INTRAVENOUS at 11:46

## 2018-09-28 RX ADMIN — POTASSIUM CHLORIDE 20 MEQ: 200 INJECTION, SOLUTION INTRAVENOUS at 05:39

## 2018-09-28 RX ADMIN — SODIUM CHLORIDE 125 ML/HR: 0.9 INJECTION, SOLUTION INTRAVENOUS at 04:58

## 2018-09-28 RX ADMIN — GABAPENTIN 100 MG: 100 CAPSULE ORAL at 21:01

## 2018-09-28 RX ADMIN — LIDOCAINE HYDROCHLORIDE 20 ML: 10 INJECTION, SOLUTION EPIDURAL; INFILTRATION; INTRACAUDAL; PERINEURAL at 04:32

## 2018-09-28 RX ADMIN — FENTANYL CITRATE 50 MCG: 50 INJECTION, SOLUTION INTRAMUSCULAR; INTRAVENOUS at 09:13

## 2018-09-28 RX ADMIN — IOHEXOL 100 ML: 350 INJECTION, SOLUTION INTRAVENOUS at 04:03

## 2018-09-28 RX ADMIN — GABAPENTIN 100 MG: 100 CAPSULE ORAL at 15:40

## 2018-09-28 RX ADMIN — FENTANYL CITRATE 25 MCG: 50 INJECTION, SOLUTION INTRAMUSCULAR; INTRAVENOUS at 13:00

## 2018-09-28 RX ADMIN — FENTANYL CITRATE 25 MCG: 50 INJECTION, SOLUTION INTRAMUSCULAR; INTRAVENOUS at 12:38

## 2018-09-28 RX ADMIN — FENTANYL CITRATE 25 MCG: 50 INJECTION, SOLUTION INTRAMUSCULAR; INTRAVENOUS at 12:40

## 2018-09-28 RX ADMIN — ACETAMINOPHEN 650 MG: 160 SUSPENSION ORAL at 21:01

## 2018-09-28 RX ADMIN — FENTANYL CITRATE 50 MCG: 50 INJECTION, SOLUTION INTRAMUSCULAR; INTRAVENOUS at 09:45

## 2018-09-28 RX ADMIN — INSULIN HUMAN 5 UNITS: 100 INJECTION, SOLUTION PARENTERAL at 12:51

## 2018-09-28 RX ADMIN — INSULIN LISPRO 3 UNITS: 100 INJECTION, SOLUTION INTRAVENOUS; SUBCUTANEOUS at 19:05

## 2018-09-28 RX ADMIN — HYDROMORPHONE HYDROCHLORIDE 1 MG: 1 INJECTION, SOLUTION INTRAMUSCULAR; INTRAVENOUS; SUBCUTANEOUS at 19:48

## 2018-09-28 RX ADMIN — ONDANSETRON 4 MG: 2 INJECTION INTRAMUSCULAR; INTRAVENOUS at 10:46

## 2018-09-28 RX ADMIN — HYDROMORPHONE HYDROCHLORIDE 0.5 MG: 1 INJECTION, SOLUTION INTRAMUSCULAR; INTRAVENOUS; SUBCUTANEOUS at 10:37

## 2018-09-28 RX ADMIN — LEVETIRACETAM 500 MG: 500 TABLET, FILM COATED ORAL at 21:01

## 2018-09-28 RX ADMIN — Medication 100 MG: at 09:13

## 2018-09-28 RX ADMIN — FENTANYL CITRATE 50 MCG: 50 INJECTION, SOLUTION INTRAMUSCULAR; INTRAVENOUS at 09:20

## 2018-09-28 RX ADMIN — HYDROMORPHONE HYDROCHLORIDE 1 MG: 1 INJECTION, SOLUTION INTRAMUSCULAR; INTRAVENOUS; SUBCUTANEOUS at 05:04

## 2018-09-28 RX ADMIN — HYDROMORPHONE HYDROCHLORIDE 1 MG: 1 INJECTION, SOLUTION INTRAMUSCULAR; INTRAVENOUS; SUBCUTANEOUS at 15:22

## 2018-09-28 NOTE — OP NOTE
OPERATIVE REPORT  PATIENT NAME: Tayo Luu    :  1960  MRN: [id number]  Pt Location: BE OR ROOM 04    SURGERY DATE: 2018    Surgeon(s) and Role:     * Bibi Maher MD - Primary     * Nichelle Sears - Assisting     * Ghislaine Barron - Assisting    Preop Diagnosis:  Type III open displaced comminuted fracture of shaft of right tibia, initial encounter [S82 251C]    Post-Op Diagnosis Codes: * Type III open displaced comminuted fracture of shaft of right tibia, initial encounter [S82 251C]    Procedure(s) (LRB):  OPEN TIBIA WOUND [address] OUT;  INSERTION NAIL IM TIBIA  AND MEDIAL MALLEOLUS FRACTURE WITH SYNTHES HARDWARE (Right)    Specimen(s):  * No specimens in log *    Estimated Blood Loss:   200 mL    Drains: None       Anesthesia Type:   General    Operative Indications:  Type III open displaced comminuted fracture of shaft of right tibia, initial encounter [S8 251C]  Open tibial shaft and medial malleolus fracture in ambulatory patient  Operative Findings:  No suellen contamination of open wound, comminution of tibial fracture site, minimal comminution of medial malleolus, able to approximate the skin edges of wound of compounding    Complications:   None    Procedure and Technique:  Patient's operative extremity was identified and marked in the preoperative area, consent was verified, and the patient was transported to the operating room  General anesthesia was induced by the anesthesia team  She was moved onto the flat top table  A bump was used under the buttock and was removed at the end of the case  Patient was appropriately positioned and the sutures approximating the open tibial shaft wound were removed  The right lower extremity was then prepped and draped in the usual sterile fashion  Attention was 1st turned to the wound of compounding at the tibial shaft    This was noted to be approximately 3 cm x 1 cm, which was extended both proximally and distally to obtain visualization of the fracture site  There was some debris noted in the wound, which was evacuated however no gross contamination was present  The fracture edges were visualized and 3 L of normal saline was used to irrigate wound with use of cysto tubing with low-flow  A curette was used to debride the fracture edges and eliminate debris and hematoma  A midline incision was made at the level of the patellar tendon and using sharp dissection and electrocautery, flaps were elevated on either side of the patellar tendon and sharp dissection was used to separate the peritenon from the tendon  Anticipated starting position was confirmed and the patellar tendon was split with scalpel  The guidewire was advanced to the appropriate proximal tibial level and confirmed on AP and lateral fluoroscopic imaging, the opening Reamer was advanced and the ball-tip guidewire was then advanced into the tibial shaft and reduction was performed at the fracture site with manual manipulation and passing of the ball-tip guidewire took place thereafter to the level of the ankle physeal scar  Progressive reamers were introduced into the tibial shaft to the 12 0 mm diameter  The 10 mm x 315 mm Synthes tibial nail was then introduced into the tibial shaft and advanced with the fracture held reduced to the end of the ball-tipped guidewire  The ball-tip guidewire was then removed and using the triple trocar as guidance, one proximal tibial medial based incision was made and 2 static locking bolts were inserted in usual fashion  The aiming arm was then removed  Utilizing perfect [place] technique, 3 distal static locking bolts were inserted in the usual fashion utilizing 2 small medial based incisions  And one anterior based incision (for anterior to posterior directed bolt) at the ankle  Direct fluoroscopic visualization was used to confirm placement of bolts through the appropriate holes in the nail    At this point, attention was focused on the medial malleolus fracture  This was noted to be well reduced and minimally displaced at the time of fixation  Two guidewires for the 4 0 mm cannulated screws were percutaneously inserted utilizing two small stab incisions and direct fluoroscopic guidance  Two long threaded 30 mm 4 0 mm cannulated screws were placed and tightened with manual screwdriver  Final images were obtained, copious irrigation bulb syringe was performed including removing all reamings from the knee joint  The patellar tendon was then closed in interrupted fashion with #1 Vicryl sutures  The paratenon was approximated with 2-0 Vicryl sutures, the subcutaneous layer was closed with 2-0 Vicryl sutures as well as the proximal tibial incisions closed with 2-0 Vicryl sutures at the subcutaneous layer, and staples used for skin closure at the proximal incisions  2-0 Nylon suture was used to approximate the distal interlock incisions as well as the wound of compounding with loose closure  Sterile dressings of Acticoat sterile gauze and ABD pads and sterile cast padding were placed at all incisions with Ace wrap from foot to knee  Final instrument counts were all correct  Dr Antoine Murrell was present for the entire procedure   Patient was extubated and transported to the patient bed and transported to the PACU in stable condition          Patient Disposition:  PACU     SIGNATURE: Salvatore Fu  DATE: September 28, 2018  TIME: 12:19 PM

## 2018-09-28 NOTE — LETTER
To Whom it May Concern,    Please excuse Angelica Santa as she has been visiting family members under our care at [address] [place] [address] in [place], [place]  Please call with any questions or concerns          All the best,         Antonio Gtz PA-C

## 2018-09-28 NOTE — ANESTHESIA POSTPROCEDURE EVALUATION
Post-Op Assessment Note      CV Status:  Stable    Mental Status:  Alert and awake    Hydration Status:  Euvolemic    PONV Controlled:  Controlled    Airway Patency:  Patent    Post Op Vitals Reviewed: Yes          Staff: CRNA       Comments: pain meds as ordered          BP (P) 154/87 (09/28/18 1215)    Temp (P) 98 4 °F (36 9 °C) (09/28/18 1215)    Pulse (P) 103 (09/28/18 1215)   Resp (P) 18 (09/28/18 1215)    SpO2 (P) 100 % (09/28/18 1215)
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
## 2018-09-28 NOTE — ANESTHESIA PREPROCEDURE EVALUATION
Review of Systems/Medical History  Patient summary reviewed        Cardiovascular  Negative cardio ROS    Pulmonary  Negative pulmonary ROS        GI/Hepatic  Negative GI/hepatic ROS          Negative  ROS        Endo/Other  Negative endo/other ROS      GYN  Negative gynecology ROS          Hematology  Negative hematology ROS      Musculoskeletal  Negative musculoskeletal ROS        Neurology  Negative neurology ROS     Comment: Subdural Psychology   Negative psychology ROS              Physical Exam    Airway    Mallampati score: II  TM Distance: >3 FB  Neck ROM: full     Dental   No notable dental hx     Cardiovascular  Comment: Negative ROS,     Pulmonary      Other Findings        Anesthesia Plan  ASA Score- 3     Anesthesia Type- general with ASA Monitors  Additional Monitors:   Airway Plan: LMA  Comment: Patient seen and examined, history reviewed  Patient to be done under general anesthesia with LMA and routine monitors  Risks discussed with the patient, consent obtained        Plan Factors-    Induction- intravenous  Postoperative Plan-     Informed Consent- Anesthetic plan and risks discussed with patient  I personally reviewed this patient with the CRNA  Discussed and agreed on the Anesthesia Plan with the CRNA  Smiley Man
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
## 2018-09-28 NOTE — ED NOTES
Pt belongings: mini wallet no cash or cell phone, cut up pants, shirt, panties, bra and one shoe        Winston Palomino  09/28/18 0420       Winston Palomino  09/28/18 042

## 2018-09-28 NOTE — TRAUMA DOCUMENTATION
Pt SpO2 dropped to 85%-87% while on room air, placed on 2L NC, now at 96%  Pt is  disoriented to place, time, and situation but oriented to person  Continuously will fall asleep but will awaken spontaneously when called  Trauma resident, Rafaela Leary, made aware of pt's new onset of confusion and drop in SpO2
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
## 2018-09-29 ENCOUNTER — APPOINTMENT (INPATIENT)
Dept: RADIOLOGY | Facility: HOSPITAL | Age: 58
DRG: 492 | End: 2018-09-29
Payer: COMMERCIAL

## 2018-09-29 LAB
ANION GAP SERPL CALCULATED.3IONS-SCNC: 5 MMOL/L (ref 4–13)
BASOPHILS # BLD AUTO: 0.01 THOUSANDS/ΜL (ref 0–0.1)
BASOPHILS # BLD AUTO: 0.02 THOUSANDS/ΜL (ref 0–0.1)
BASOPHILS # BLD AUTO: 0.03 THOUSANDS/ΜL (ref 0–0.1)
BASOPHILS NFR BLD AUTO: 0 % (ref 0–1)
BUN SERPL-MCNC: 7 MG/DL (ref 5–25)
CALCIUM SERPL-MCNC: 7.8 MG/DL (ref 8.3–10.1)
CHLORIDE SERPL-SCNC: 103 MMOL/L (ref 100–108)
CO2 SERPL-SCNC: 29 MMOL/L (ref 21–32)
CREAT SERPL-MCNC: 0.49 MG/DL (ref 0.6–1.3)
EOSINOPHIL # BLD AUTO: 0 THOUSAND/ΜL (ref 0–0.61)
EOSINOPHIL # BLD AUTO: 0.01 THOUSAND/ΜL (ref 0–0.61)
EOSINOPHIL # BLD AUTO: 0.01 THOUSAND/ΜL (ref 0–0.61)
EOSINOPHIL NFR BLD AUTO: 0 % (ref 0–6)
ERYTHROCYTE [DISTWIDTH] IN BLOOD BY AUTOMATED COUNT: 13.2 % (ref 11.6–15.1)
ERYTHROCYTE [DISTWIDTH] IN BLOOD BY AUTOMATED COUNT: 13.2 % (ref 11.6–15.1)
ERYTHROCYTE [DISTWIDTH] IN BLOOD BY AUTOMATED COUNT: 13.4 % (ref 11.6–15.1)
GFR SERPL CREATININE-BSD FRML MDRD: 109 ML/MIN/1.73SQ M
GLUCOSE SERPL-MCNC: 199 MG/DL (ref 65–140)
GLUCOSE SERPL-MCNC: 219 MG/DL (ref 65–140)
GLUCOSE SERPL-MCNC: 226 MG/DL (ref 65–140)
GLUCOSE SERPL-MCNC: 235 MG/DL (ref 65–140)
GLUCOSE SERPL-MCNC: 290 MG/DL (ref 65–140)
HCT VFR BLD AUTO: 24.6 % (ref 34.8–46.1)
HCT VFR BLD AUTO: 26.3 % (ref 34.8–46.1)
HCT VFR BLD AUTO: 27.4 % (ref 34.8–46.1)
HGB BLD-MCNC: 8.2 G/DL (ref 11.5–15.4)
HGB BLD-MCNC: 8.7 G/DL (ref 11.5–15.4)
HGB BLD-MCNC: 9 G/DL (ref 11.5–15.4)
IMM GRANULOCYTES # BLD AUTO: 0.02 THOUSAND/UL (ref 0–0.2)
IMM GRANULOCYTES # BLD AUTO: 0.02 THOUSAND/UL (ref 0–0.2)
IMM GRANULOCYTES # BLD AUTO: 0.03 THOUSAND/UL (ref 0–0.2)
IMM GRANULOCYTES NFR BLD AUTO: 0 % (ref 0–2)
LYMPHOCYTES # BLD AUTO: 1.13 THOUSANDS/ΜL (ref 0.6–4.47)
LYMPHOCYTES # BLD AUTO: 1.86 THOUSANDS/ΜL (ref 0.6–4.47)
LYMPHOCYTES # BLD AUTO: 2.06 THOUSANDS/ΜL (ref 0.6–4.47)
LYMPHOCYTES NFR BLD AUTO: 14 % (ref 14–44)
LYMPHOCYTES NFR BLD AUTO: 21 % (ref 14–44)
LYMPHOCYTES NFR BLD AUTO: 26 % (ref 14–44)
MCH RBC QN AUTO: 28.4 PG (ref 26.8–34.3)
MCH RBC QN AUTO: 28.7 PG (ref 26.8–34.3)
MCH RBC QN AUTO: 28.8 PG (ref 26.8–34.3)
MCHC RBC AUTO-ENTMCNC: 32.8 G/DL (ref 31.4–37.4)
MCHC RBC AUTO-ENTMCNC: 33.1 G/DL (ref 31.4–37.4)
MCHC RBC AUTO-ENTMCNC: 33.3 G/DL (ref 31.4–37.4)
MCV RBC AUTO: 86 FL (ref 82–98)
MCV RBC AUTO: 86 FL (ref 82–98)
MCV RBC AUTO: 88 FL (ref 82–98)
MONOCYTES # BLD AUTO: 0.31 THOUSAND/ΜL (ref 0.17–1.22)
MONOCYTES # BLD AUTO: 0.31 THOUSAND/ΜL (ref 0.17–1.22)
MONOCYTES # BLD AUTO: 0.34 THOUSAND/ΜL (ref 0.17–1.22)
MONOCYTES NFR BLD AUTO: 4 % (ref 4–12)
NEUTROPHILS # BLD AUTO: 5.64 THOUSANDS/ΜL (ref 1.85–7.62)
NEUTROPHILS # BLD AUTO: 6.5 THOUSANDS/ΜL (ref 1.85–7.62)
NEUTROPHILS # BLD AUTO: 6.62 THOUSANDS/ΜL (ref 1.85–7.62)
NEUTS SEG NFR BLD AUTO: 70 % (ref 43–75)
NEUTS SEG NFR BLD AUTO: 75 % (ref 43–75)
NEUTS SEG NFR BLD AUTO: 82 % (ref 43–75)
NRBC BLD AUTO-RTO: 0 /100 WBCS
PLATELET # BLD AUTO: 124 THOUSANDS/UL (ref 149–390)
PLATELET # BLD AUTO: 130 THOUSANDS/UL (ref 149–390)
PLATELET # BLD AUTO: 137 THOUSANDS/UL (ref 149–390)
PMV BLD AUTO: 11.3 FL (ref 8.9–12.7)
PMV BLD AUTO: 11.8 FL (ref 8.9–12.7)
PMV BLD AUTO: 12 FL (ref 8.9–12.7)
POTASSIUM SERPL-SCNC: 3.3 MMOL/L (ref 3.5–5.3)
RBC # BLD AUTO: 2.86 MILLION/UL (ref 3.81–5.12)
RBC # BLD AUTO: 3.06 MILLION/UL (ref 3.81–5.12)
RBC # BLD AUTO: 3.13 MILLION/UL (ref 3.81–5.12)
SODIUM SERPL-SCNC: 137 MMOL/L (ref 136–145)
WBC # BLD AUTO: 8 THOUSAND/UL (ref 4.31–10.16)
WBC # BLD AUTO: 8.06 THOUSAND/UL (ref 4.31–10.16)
WBC # BLD AUTO: 8.86 THOUSAND/UL (ref 4.31–10.16)

## 2018-09-29 PROCEDURE — 85025 COMPLETE CBC W/AUTO DIFF WBC: CPT | Performed by: ORTHOPAEDIC SURGERY

## 2018-09-29 PROCEDURE — 80048 BASIC METABOLIC PNL TOTAL CA: CPT | Performed by: ORTHOPAEDIC SURGERY

## 2018-09-29 PROCEDURE — 94760 N-INVAS EAR/PLS OXIMETRY 1: CPT

## 2018-09-29 PROCEDURE — 82948 REAGENT STRIP/BLOOD GLUCOSE: CPT

## 2018-09-29 PROCEDURE — 85025 COMPLETE CBC W/AUTO DIFF WBC: CPT | Performed by: EMERGENCY MEDICINE

## 2018-09-29 PROCEDURE — 99232 SBSQ HOSP IP/OBS MODERATE 35: CPT | Performed by: SURGERY

## 2018-09-29 PROCEDURE — 99232 SBSQ HOSP IP/OBS MODERATE 35: CPT | Performed by: NEUROLOGICAL SURGERY

## 2018-09-29 PROCEDURE — 99024 POSTOP FOLLOW-UP VISIT: CPT | Performed by: ORTHOPAEDIC SURGERY

## 2018-09-29 PROCEDURE — 72080 X-RAY EXAM THORACOLMB 2/> VW: CPT

## 2018-09-29 RX ORDER — POTASSIUM CHLORIDE 20 MEQ/1
40 TABLET, EXTENDED RELEASE ORAL ONCE
Status: COMPLETED | OUTPATIENT
Start: 2018-09-29 | End: 2018-09-29

## 2018-09-29 RX ORDER — POTASSIUM CHLORIDE 14.9 MG/ML
20 INJECTION INTRAVENOUS ONCE
Status: COMPLETED | OUTPATIENT
Start: 2018-09-29 | End: 2018-09-29

## 2018-09-29 RX ORDER — LIDOCAINE 50 MG/G
1 PATCH TOPICAL DAILY
Status: DISCONTINUED | OUTPATIENT
Start: 2018-09-29 | End: 2018-10-02 | Stop reason: HOSPADM

## 2018-09-29 RX ORDER — HEPARIN SODIUM 5000 [USP'U]/ML
5000 INJECTION, SOLUTION INTRAVENOUS; SUBCUTANEOUS EVERY 8 HOURS SCHEDULED
Status: DISCONTINUED | OUTPATIENT
Start: 2018-09-29 | End: 2018-10-02 | Stop reason: HOSPADM

## 2018-09-29 RX ORDER — METOPROLOL TARTRATE 5 MG/5ML
5 INJECTION INTRAVENOUS EVERY 6 HOURS PRN
Status: DISCONTINUED | OUTPATIENT
Start: 2018-09-29 | End: 2018-10-02 | Stop reason: HOSPADM

## 2018-09-29 RX ADMIN — INSULIN LISPRO 2 UNITS: 100 INJECTION, SOLUTION INTRAVENOUS; SUBCUTANEOUS at 06:03

## 2018-09-29 RX ADMIN — OXYCODONE HYDROCHLORIDE 10 MG: 10 TABLET ORAL at 13:42

## 2018-09-29 RX ADMIN — METOPROLOL TARTRATE 5 MG: 1 INJECTION, SOLUTION INTRAVENOUS at 06:03

## 2018-09-29 RX ADMIN — ACETAMINOPHEN 650 MG: 160 SUSPENSION ORAL at 08:10

## 2018-09-29 RX ADMIN — CEFAZOLIN SODIUM 2000 MG: 2 SOLUTION INTRAVENOUS at 00:18

## 2018-09-29 RX ADMIN — SENNOSIDES AND DOCUSATE SODIUM 2 TABLET: 8.6; 5 TABLET ORAL at 08:02

## 2018-09-29 RX ADMIN — METHOCARBAMOL 500 MG: 500 TABLET ORAL at 00:18

## 2018-09-29 RX ADMIN — HEPARIN SODIUM 5000 UNITS: 5000 INJECTION, SOLUTION INTRAVENOUS; SUBCUTANEOUS at 08:03

## 2018-09-29 RX ADMIN — INSULIN LISPRO 6 UNITS: 100 INJECTION, SOLUTION INTRAVENOUS; SUBCUTANEOUS at 12:27

## 2018-09-29 RX ADMIN — INSULIN LISPRO 2 UNITS: 100 INJECTION, SOLUTION INTRAVENOUS; SUBCUTANEOUS at 00:19

## 2018-09-29 RX ADMIN — SODIUM CHLORIDE 125 ML/HR: 0.9 INJECTION, SOLUTION INTRAVENOUS at 04:14

## 2018-09-29 RX ADMIN — HYDROMORPHONE HYDROCHLORIDE 1 MG: 1 INJECTION, SOLUTION INTRAMUSCULAR; INTRAVENOUS; SUBCUTANEOUS at 22:27

## 2018-09-29 RX ADMIN — HEPARIN SODIUM 5000 UNITS: 5000 INJECTION, SOLUTION INTRAVENOUS; SUBCUTANEOUS at 21:08

## 2018-09-29 RX ADMIN — HEPARIN SODIUM 5000 UNITS: 5000 INJECTION, SOLUTION INTRAVENOUS; SUBCUTANEOUS at 13:42

## 2018-09-29 RX ADMIN — POTASSIUM CHLORIDE 20 MEQ: 200 INJECTION, SOLUTION INTRAVENOUS at 08:03

## 2018-09-29 RX ADMIN — METHOCARBAMOL 500 MG: 500 TABLET ORAL at 06:03

## 2018-09-29 RX ADMIN — INSULIN LISPRO 4 UNITS: 100 INJECTION, SOLUTION INTRAVENOUS; SUBCUTANEOUS at 20:21

## 2018-09-29 RX ADMIN — LEVETIRACETAM 500 MG: 500 TABLET, FILM COATED ORAL at 08:03

## 2018-09-29 RX ADMIN — OXYCODONE HYDROCHLORIDE 10 MG: 10 TABLET ORAL at 03:32

## 2018-09-29 RX ADMIN — POTASSIUM CHLORIDE 40 MEQ: 1500 TABLET, EXTENDED RELEASE ORAL at 08:03

## 2018-09-29 RX ADMIN — CEFAZOLIN SODIUM 2000 MG: 2 SOLUTION INTRAVENOUS at 08:10

## 2018-09-29 RX ADMIN — HYDROMORPHONE HYDROCHLORIDE 1 MG: 1 INJECTION, SOLUTION INTRAMUSCULAR; INTRAVENOUS; SUBCUTANEOUS at 13:42

## 2018-09-29 RX ADMIN — LEVETIRACETAM 500 MG: 500 TABLET, FILM COATED ORAL at 20:19

## 2018-09-29 RX ADMIN — OXYCODONE HYDROCHLORIDE 10 MG: 10 TABLET ORAL at 21:08

## 2018-09-29 RX ADMIN — GABAPENTIN 100 MG: 100 CAPSULE ORAL at 20:19

## 2018-09-29 RX ADMIN — GABAPENTIN 100 MG: 100 CAPSULE ORAL at 08:03

## 2018-09-29 RX ADMIN — METHOCARBAMOL 500 MG: 500 TABLET ORAL at 12:34

## 2018-09-29 RX ADMIN — LIDOCAINE 1 PATCH: 50 PATCH CUTANEOUS at 08:02

## 2018-09-29 NOTE — PROGRESS NOTES
Subjective: No acute events overnight  No acute distress  Patient's pain well controlled  Objective:  Lab Results   Component Value Date/Time    WBC 8 00 09/29/2018 05:32 AM    HGB 8 7 (L) 09/29/2018 05:32 AM       Vitals:    09/29/18 0700   BP: 128/66   Pulse: 94   Resp: 19   Temp:    SpO2: 92%     Right lower extremity  Dressing c/d/i  Motor & sensation grossly intact  Musculature soft compressible  No pain with passive stretch of the great toe  DP and PT pulse intact    Assessment: Post op from right tibia IM nail and screw fixation of the medial malleolus    Plan:  NWB to the right lower extremity  Continue Ancef Q 8 hr for 48 hr postop  Pain control  DVT ppx  PT  Patient noted to have acute blood loss anemia postoperatively greater than 2 0 mg/dL from baseline    She will be resuscitated with IV fluids as needed  Dispo:  Ortho will follow
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
## 2018-09-30 PROBLEM — D64.9 ANEMIA: Status: ACTIVE | Noted: 2018-09-30

## 2018-09-30 LAB
ANION GAP SERPL CALCULATED.3IONS-SCNC: 7 MMOL/L (ref 4–13)
BASOPHILS # BLD AUTO: 0.03 THOUSANDS/ΜL (ref 0–0.1)
BASOPHILS NFR BLD AUTO: 0 % (ref 0–1)
BUN SERPL-MCNC: 8 MG/DL (ref 5–25)
CALCIUM SERPL-MCNC: 8 MG/DL (ref 8.3–10.1)
CHLORIDE SERPL-SCNC: 100 MMOL/L (ref 100–108)
CO2 SERPL-SCNC: 27 MMOL/L (ref 21–32)
CREAT SERPL-MCNC: 0.43 MG/DL (ref 0.6–1.3)
EOSINOPHIL # BLD AUTO: 0.01 THOUSAND/ΜL (ref 0–0.61)
EOSINOPHIL NFR BLD AUTO: 0 % (ref 0–6)
ERYTHROCYTE [DISTWIDTH] IN BLOOD BY AUTOMATED COUNT: 12.9 % (ref 11.6–15.1)
GFR SERPL CREATININE-BSD FRML MDRD: 113 ML/MIN/1.73SQ M
GLUCOSE SERPL-MCNC: 211 MG/DL (ref 65–140)
GLUCOSE SERPL-MCNC: 215 MG/DL (ref 65–140)
GLUCOSE SERPL-MCNC: 220 MG/DL (ref 65–140)
GLUCOSE SERPL-MCNC: 223 MG/DL (ref 65–140)
GLUCOSE SERPL-MCNC: 262 MG/DL (ref 65–140)
HCT VFR BLD AUTO: 23.1 % (ref 34.8–46.1)
HCT VFR BLD AUTO: 23.5 % (ref 34.8–46.1)
HGB BLD-MCNC: 7.5 G/DL (ref 11.5–15.4)
HGB BLD-MCNC: 7.7 G/DL (ref 11.5–15.4)
IMM GRANULOCYTES # BLD AUTO: 0.04 THOUSAND/UL (ref 0–0.2)
IMM GRANULOCYTES NFR BLD AUTO: 1 % (ref 0–2)
LYMPHOCYTES # BLD AUTO: 1.6 THOUSANDS/ΜL (ref 0.6–4.47)
LYMPHOCYTES NFR BLD AUTO: 18 % (ref 14–44)
MCH RBC QN AUTO: 28.4 PG (ref 26.8–34.3)
MCHC RBC AUTO-ENTMCNC: 32.8 G/DL (ref 31.4–37.4)
MCV RBC AUTO: 87 FL (ref 82–98)
MONOCYTES # BLD AUTO: 0.36 THOUSAND/ΜL (ref 0.17–1.22)
MONOCYTES NFR BLD AUTO: 4 % (ref 4–12)
NEUTROPHILS # BLD AUTO: 6.66 THOUSANDS/ΜL (ref 1.85–7.62)
NEUTS SEG NFR BLD AUTO: 77 % (ref 43–75)
NRBC BLD AUTO-RTO: 0 /100 WBCS
PLATELET # BLD AUTO: 123 THOUSANDS/UL (ref 149–390)
PMV BLD AUTO: 12 FL (ref 8.9–12.7)
POTASSIUM SERPL-SCNC: 3.6 MMOL/L (ref 3.5–5.3)
RBC # BLD AUTO: 2.71 MILLION/UL (ref 3.81–5.12)
SODIUM SERPL-SCNC: 134 MMOL/L (ref 136–145)
WBC # BLD AUTO: 8.7 THOUSAND/UL (ref 4.31–10.16)

## 2018-09-30 PROCEDURE — 99233 SBSQ HOSP IP/OBS HIGH 50: CPT | Performed by: PHYSICIAN ASSISTANT

## 2018-09-30 PROCEDURE — 85025 COMPLETE CBC W/AUTO DIFF WBC: CPT | Performed by: ORTHOPAEDIC SURGERY

## 2018-09-30 PROCEDURE — 82948 REAGENT STRIP/BLOOD GLUCOSE: CPT

## 2018-09-30 PROCEDURE — 99024 POSTOP FOLLOW-UP VISIT: CPT | Performed by: PHYSICIAN ASSISTANT

## 2018-09-30 PROCEDURE — G8989 SELF CARE D/C STATUS: HCPCS

## 2018-09-30 PROCEDURE — G8988 SELF CARE GOAL STATUS: HCPCS

## 2018-09-30 PROCEDURE — 85018 HEMOGLOBIN: CPT | Performed by: SURGERY

## 2018-09-30 PROCEDURE — 85014 HEMATOCRIT: CPT | Performed by: SURGERY

## 2018-09-30 PROCEDURE — 99233 SBSQ HOSP IP/OBS HIGH 50: CPT | Performed by: SURGERY

## 2018-09-30 PROCEDURE — 80048 BASIC METABOLIC PNL TOTAL CA: CPT | Performed by: ORTHOPAEDIC SURGERY

## 2018-09-30 PROCEDURE — 97167 OT EVAL HIGH COMPLEX 60 MIN: CPT

## 2018-09-30 RX ORDER — HYDROMORPHONE HCL/PF 1 MG/ML
1 SYRINGE (ML) INJECTION
Status: DISCONTINUED | OUTPATIENT
Start: 2018-09-30 | End: 2018-10-02 | Stop reason: HOSPADM

## 2018-09-30 RX ORDER — POTASSIUM CHLORIDE 14.9 MG/ML
20 INJECTION INTRAVENOUS
Status: COMPLETED | OUTPATIENT
Start: 2018-09-30 | End: 2018-09-30

## 2018-09-30 RX ADMIN — HEPARIN SODIUM 5000 UNITS: 5000 INJECTION, SOLUTION INTRAVENOUS; SUBCUTANEOUS at 05:26

## 2018-09-30 RX ADMIN — OXYCODONE HYDROCHLORIDE 10 MG: 10 TABLET ORAL at 05:26

## 2018-09-30 RX ADMIN — LEVETIRACETAM 500 MG: 500 TABLET, FILM COATED ORAL at 20:18

## 2018-09-30 RX ADMIN — SENNOSIDES AND DOCUSATE SODIUM 2 TABLET: 8.6; 5 TABLET ORAL at 09:48

## 2018-09-30 RX ADMIN — HEPARIN SODIUM 5000 UNITS: 5000 INJECTION, SOLUTION INTRAVENOUS; SUBCUTANEOUS at 15:18

## 2018-09-30 RX ADMIN — CEFAZOLIN SODIUM 2000 MG: 2 SOLUTION INTRAVENOUS at 09:44

## 2018-09-30 RX ADMIN — LEVETIRACETAM 500 MG: 500 TABLET, FILM COATED ORAL at 09:49

## 2018-09-30 RX ADMIN — OXYCODONE HYDROCHLORIDE 10 MG: 10 TABLET ORAL at 20:17

## 2018-09-30 RX ADMIN — METHOCARBAMOL 500 MG: 500 TABLET ORAL at 05:26

## 2018-09-30 RX ADMIN — POTASSIUM CHLORIDE 20 MEQ: 200 INJECTION, SOLUTION INTRAVENOUS at 13:00

## 2018-09-30 RX ADMIN — GABAPENTIN 100 MG: 100 CAPSULE ORAL at 09:49

## 2018-09-30 RX ADMIN — GABAPENTIN 100 MG: 100 CAPSULE ORAL at 20:17

## 2018-09-30 RX ADMIN — POTASSIUM CHLORIDE 20 MEQ: 200 INJECTION, SOLUTION INTRAVENOUS at 11:12

## 2018-09-30 RX ADMIN — METHOCARBAMOL 500 MG: 500 TABLET ORAL at 20:17

## 2018-09-30 RX ADMIN — GABAPENTIN 100 MG: 100 CAPSULE ORAL at 15:19

## 2018-09-30 RX ADMIN — METHOCARBAMOL 500 MG: 500 TABLET ORAL at 23:31

## 2018-09-30 RX ADMIN — METHOCARBAMOL 500 MG: 500 TABLET ORAL at 00:52

## 2018-09-30 RX ADMIN — LIDOCAINE 1 PATCH: 50 PATCH CUTANEOUS at 09:50

## 2018-09-30 RX ADMIN — OXYCODONE HYDROCHLORIDE 10 MG: 10 TABLET ORAL at 10:08

## 2018-09-30 RX ADMIN — METHOCARBAMOL 500 MG: 500 TABLET ORAL at 13:04

## 2018-09-30 RX ADMIN — HEPARIN SODIUM 5000 UNITS: 5000 INJECTION, SOLUTION INTRAVENOUS; SUBCUTANEOUS at 23:31

## 2018-09-30 RX ADMIN — CEFAZOLIN SODIUM 2000 MG: 2 SOLUTION INTRAVENOUS at 00:52

## 2018-09-30 NOTE — ASSESSMENT & PLAN NOTE
Neurosurgery following  Plan for [facility] in 2 weeks  On SQH  On Keppra - 1 week duration

## 2018-09-30 NOTE — PROGRESS NOTES
Trauma    Progress Note - Carli Falling 1960, 63 y o  female MRN: [id number]    Unit/Bed#: [facility] 934-01 Encounter: [id number]    Primary Care Provider: Indiana Alberto DO   Date and time admitted to hospital: 9/28/2018  3:17 AM        Subdural hematoma [facility])   Assessment & Plan    Neurosurgery following  Plan for [facility] in 2 weeks  On SQH  On Keppra - 1 week duration     Subarachnoid bleed [facility])   Assessment & Plan    Management as SDH     Closed fracture of multiple ribs of left side   Assessment & Plan    Left 5-7 rib fractures  Scheduled tylenol, lidocaine patch, PRN pain meds  IS  Wean O2 to >92%     Closed fracture of lumbar vertebral body (HCC)   Assessment & Plan    T11-L2 Veretbral fractures  Neurosurgery following  F/u Upright XR in brace  TLSO when out of bed     Closed fracture of thoracic vertebral body ([address] )   Assessment & Plan    As above     Open displaced comminuted fracture of shaft of right tibia   Assessment & Plan    S/p IM nail and ORIF medial malleolus 9/28  Ortho signed off  NWB RLE, ok to d/c from ortho perspective  F/u PT for further discharge recs     * Anemia   Assessment & Plan    Hgb 7 7 this AM from 9  Will f/u 2PM Hgb               Subjective/Objective   Subjective: Pain is still significant  Will increase dilaudid frequency to start  No fevers overnight  Last temp >!00 4 was yesterday AM      Objective:     Meds/Allergies   No prescriptions prior to admission  Vitals: Blood pressure 153/70, pulse 97, temperature 99 2 °F (37 3 °C), temperature source Oral, resp  rate 18, height 5' 2" (1 575 m), weight 81 8 kg (180 lb 5 4 oz), SpO2 98 %  Body mass index is 32 98 kg/m²   SpO2: SpO2: 98 %    ABG: No results found for: PHART, OUN5LKP, PO2ART, HCZ7XRZ, D6HOVKTN, BEART, SOURCE      Intake/Output Summary (Last 24 hours) at 09/30/18 1029  Last data filed at 09/30/18 0932   Gross per 24 hour   Intake           460 83 ml   Output              887 ml   Net          -426 17 ml Invasive Devices     Peripheral Intravenous Line            Peripheral IV 09/28/18 Right Antecubital 2 days                Nutrition/GI Proph/Bowel Reg: Diabetic diet    Physical Exam:   GENERAL APPEARANCE: Uncomfortable 2/2 pain  HEENT: WNL  CV: Tachycardia to 99 2/2 pain  LUNGS: On 2L while sleeping to keep sats >92%  ABD: Soft, non-tender  EXT: RLE dressing c/d/i  Leg soft  Palpable DP  Sensation intact  NEURO: WNL  SKIN: Wnl    Lab Results:   Results: I have personally reviewed pertinent reports   , BMP/CMP:   Lab Results   Component Value Date     (L) 09/30/2018    K 3 6 09/30/2018     09/30/2018    CO2 27 09/30/2018    BUN 8 09/30/2018    CREATININE 0 43 (L) 09/30/2018    CALCIUM 8 0 (L) 09/30/2018    EGFR 113 09/30/2018   , CBC:   Lab Results   Component Value Date    WBC 8 70 09/30/2018    HGB 7 7 (L) 09/30/2018    HCT 23 5 (L) 09/30/2018    MCV 87 09/30/2018     (L) 09/30/2018    MCH 28 4 09/30/2018    MCHC 32 8 09/30/2018    RDW 12 9 09/30/2018    MPV 12 0 09/30/2018    NRBC 0 09/30/2018    and Coagulation: No results found for: PT, INR, APTT  Imaging/EKG Studies: Xr Femur 2 Vw Right    Result Date: 9/28/2018  Impression: 1  No active pulmonary disease  2   Nondisplaced fractures lateral aspects of the left 6th and 7th ribs  No evidence of pneumothorax  3   No evidence of pelvic fracture  4   No evidence of femur fracture  5   Displaced, comminuted open fracture of the distal tibia  6   Displaced, comminuted medial malleolar fracture  7   Displaced mid fibular fracture  Workstation performed: [id number]     Xr Tibia Fibula 2 Vw Right    Result Date: 9/28/2018  Impression: Fluoroscopic guidance provided for orthopedic surgical procedure  Please refer to the separate procedure notes for additional details  Workstation performed: [id number]     Xr Tibia Fibula 2 Vw Right    Result Date: 9/28/2018  Impression: 1  No active pulmonary disease    2   Nondisplaced fractures lateral aspects of the left 6th and 7th ribs  No evidence of pneumothorax  3   No evidence of pelvic fracture  4   No evidence of femur fracture  5   Displaced, comminuted open fracture of the distal tibia  6   Displaced, comminuted medial malleolar fracture  7   Displaced mid fibular fracture  Workstation performed: [id number]     Xr Tibia Fibula 2 Vw Right    Result Date: 9/28/2018  Impression: Comminuted fracture distal shaft of the tibia Transverse fracture through medial malleolus and the fracture of the fibular shaft suggestive mahoney type C fracture in the ankle Workstation performed: [id number]     Xr Foot 2 Vw Right    Result Date: 9/28/2018  Impression: 1  No active pulmonary disease  2   Nondisplaced fractures lateral aspects of the left 6th and 7th ribs  No evidence of pneumothorax  3   No evidence of pelvic fracture  4   No evidence of femur fracture  5   Displaced, comminuted open fracture of the distal tibia  6   Displaced, comminuted medial malleolar fracture  7   Displaced mid fibular fracture  Workstation performed: [id number]     Ct Head Wo Contrast    Result Date: 9/28/2018  Impression: Acute bifrontal and parafalcine subarachnoid and anterior interhemispheric and left tentorium cerebelli subdural hemorrhage  No significant interval change  Workstation performed: [id number]     Trauma - Ct Head Wo Contrast    Result Date: 9/28/2018  Impression: Extra-axial blood products within the midline frontal sulci and along the cerebral falx and left tentorium  I personally discussed this study with Earnitacho Roberson on 9/28/2018 at 4:24 AM  Workstation performed: [id number]     Trauma - Ct Spine Cervical Wo Contrast    Result Date: 9/28/2018  Impression: 1  Subcutaneous stranding along the left lateral neck likely compatible with seatbelt injury  2   No acute cervical spine fracture or traumatic malalignment    Workstation performed: [id number]     Ct Ankle Right Wo Contrast    Result Date: 9/28/2018  Impression: Right tibiofibular fractures and right medial malleolar fracture as described above  Workstation performed: [id number]     Xr Chest 1 View    Result Date: 9/28/2018  Impression: 1  No active pulmonary disease  2   Nondisplaced fractures lateral aspects of the left 6th and 7th ribs  No evidence of pneumothorax  3   No evidence of pelvic fracture  4   No evidence of femur fracture  5   Displaced, comminuted open fracture of the distal tibia  6   Displaced, comminuted medial malleolar fracture  7   Displaced mid fibular fracture  Workstation performed: [id number]     Xr Pelvis Ap Only 1 Or 2 Vw    Result Date: 9/28/2018  Impression: 1  No active pulmonary disease  2   Nondisplaced fractures lateral aspects of the left 6th and 7th ribs  No evidence of pneumothorax  3   No evidence of pelvic fracture  4   No evidence of femur fracture  5   Displaced, comminuted open fracture of the distal tibia  6   Displaced, comminuted medial malleolar fracture  7   Displaced mid fibular fracture  Workstation performed: [id number]     Trauma - Ct Chest Abdomen Pelvis W Contrast    Result Date: 9/28/2018  Impression: 1  Subcutaneous stranding along the anterior chest and across the pelvis compatible with seatbelt injury  There are small foci of subcutaneous hemorrhage along the anterior pelvic wall bilaterally compatible with active bleeding  2   Fractures of the left lateral 5th through 7th ribs  3   Acute superior endplate fractures at V60, T12, L1, and L2 with mild superior endplate depression  4   Multiple pulmonary nodules bilaterally measuring up to 10 mm  Based on current Fleischner Society 2017 Guidelines on incidental pulmonary nodule, followup non-contrast CT is recommended at 3-6 months from the initial examination and, if stable at that time, an additional followup is recommended for 18-24 months from the initial examination   5   3 0 x 2 1 cm hyperdense lesion within the right lobe of the liver which is incompletely characterized however matches the blood pool and likely represents a hemangioma  Confirmation with MRI may be obtained  I personally discussed this study with Abi Maki on 9/28/2018 at 4:21 AM  Workstation performed: [id number]     Xr Trauma Multiple    Result Date: 9/28/2018  Impression: 1  No active pulmonary disease  2   Nondisplaced fractures lateral aspects of the left 6th and 7th ribs  No evidence of pneumothorax  3   No evidence of pelvic fracture  4   No evidence of femur fracture  5   Displaced, comminuted open fracture of the distal tibia  6   Displaced, comminuted medial malleolar fracture  7   Displaced mid fibular fracture   Workstation performed: [id number]     Other Studies:   VTE Prophylaxis: SQH

## 2018-09-30 NOTE — PROGRESS NOTES
Orthopedics   Elieser Pisano 63 y o  female MRN: [id number]  Unit/Bed#: [facility] 934-01    Subjective:  63 y  o female post operative day 2 IMN right tibia with screw fixation of the medial malleolus  Pt doing well  Patient having left sided chest pain with movement and palpation, she denies any associated cardiac complaints   NO overnight events    Labs:    0  Lab Value Date/Time   HCT 27 4 (L) 09/29/2018 0829   HCT 26 3 (L) 09/29/2018 0532   HCT 24 6 (L) 09/29/2018 0026   HGB 9 0 (L) 09/29/2018 0829   HGB 8 7 (L) 09/29/2018 0532   HGB 8 2 (L) 09/29/2018 0026   INR 1 06 09/28/2018 0814   WBC 8 86 09/29/2018 0829   WBC 8 00 09/29/2018 0532   WBC 8 06 09/29/2018 0026       Meds:    Current Facility-Administered Medications:     acetaminophen (TYLENOL) oral suspension 650 mg, 650 mg, Oral, Q4H PRN, Ira Kee MD, 650 mg at 09/29/18 0810    ceFAZolin (ANCEF) IVPB (premix) 2,000 mg, 2,000 mg, Intravenous, Q8H, Suzanne Kawasaki, Stopped at 09/30/18 0122    gabapentin (NEURONTIN) capsule 100 mg, 100 mg, Oral, TID, Austin, DO, 100 mg at 09/29/18 2019    heparin (porcine) subcutaneous injection 5,000 Units, 5,000 Units, Subcutaneous, Q8H [address], Ira Kee MD, 5,000 Units at 09/30/18 0526    HYDROmorphone (DILAUDID) injection 1 mg, 1 mg, Intravenous, Q4H PRN, Schley, DO, 1 mg at 09/29/18 2227    insulin lispro (HumaLOG) 100 units/mL subcutaneous injection 2-12 Units, 2-12 Units, Subcutaneous, TID AC, 4 Units at 09/29/18 2021 **AND** Fingerstick Glucose (POCT), , , TID AC, Dagmar Ag MD    levETIRAcetam (KEPPRA) tablet 500 mg, 500 mg, Oral, Q12H [address], Schley, DO, 500 mg at 09/29/18 2019    lidocaine (LIDODERM) 5 % patch 1 patch, 1 patch, Topical, Daily, Ira Kee MD, 1 patch at 09/29/18 0802    methocarbamol (ROBAXIN) tablet 500 mg, 500 mg, Oral, Q6H [address], Schley, DO, 500 mg at 09/30/18 0526    metoprolol (LOPRESSOR) injection 5 mg, 5 mg, Intravenous, Q6H PRN, Ira Kee MD, 5 mg at 09/29/18 0603    ondansetron (ZOFRAN) injection 4 mg, 4 mg, Intravenous, Q4H PRN, Tad Tess, DO, 4 mg at 09/28/18 1950    oxyCODONE (ROXICODONE) immediate release tablet 10 mg, 10 mg, Oral, Q4H PRN, Tad Tess, DO, 10 mg at 09/30/18 0526    oxyCODONE (ROXICODONE) IR tablet 5 mg, 5 mg, Oral, Q4H PRN, Tad Tess, DO    senna-docusate sodium (SENOKOT S) 8 6-50 mg per tablet 2 tablet, 2 tablet, Oral, Daily, Tad Tess, DO, 2 tablet at 09/29/18 0802    Blood Culture:   No results found for: BLOODCX    Wound Culture:   No results found for: WOUNDCULT    Ins and Outs:  I/O last 24 hours: In: 2118 8 [I V :1768 8; IV Piggyback:350]  Out: 0455 [Urine:2935]      Physical:  Vitals:    09/30/18 0300   BP: 170/78   Pulse: (!) 106   Resp: 18   Temp: 100 1 °F (37 8 °C)   SpO2: 97%     right lower extremity  · Dressings clean Dry Intact  · 4/5 strength to EHL/FHL, + knee flexion/extension  · Sensation intact L1-S1  · +2 DP Pulse    _*_*_*_*_*_*_*_*_*_*_*_*_*_*_*_*_*_*_*_*_*_*_*_*_*_*_*_*_*_*_*_*_*_*_*_*_*_*_*_*_*    Assessment:   63 y  o female post operative day 2 IMN right tibia with screw fixation of the medial malleolus  Doing well  - patient does have left sided rib fractures and a sternum fracture    Plan:  · NWB right lower extremity  · Patient to use pillow to stabilize her chest with any coughing  · Reviewed this with daughter who is at bedside    · Complete ancef x 48 hours  · Up out of bed  · Ice, Elevation to affected extremity  · Analgesics  · DVT prophylaxis  · PT/OT  · Dispo: Ok for discharge from ortho perspective  · Will continue to assess for acute blood loss anemia    Malissa Lesches, PA-C

## 2018-09-30 NOTE — DISCHARGE INSTRUCTIONS
Neurosurgery discharge instructions  · Do not take any blood thinning medications (ie  No Advil  No motrin  No ibuprofen  No Aleve  No Aspirin  No fishoil  No heparin  No antiplatelet / no anticoagulation medication)  · Refrain from activity that increases chance of trauma to head or falls  Recommend you take fall precaution  · No strenuous activity or sports  No heavy lifting  No Driving  · TLSO brace to be worn when out of bed of head of bed greater than 45 degrees  May be removed for showering  · Return to hospital Emergency Room if you experience worsening / new headache, nausea/vomiting, speech/vision change, seizure, confusion / mental status change, weakness, or other neurological changes  · **Please notify MD immediately if you have increased back or leg pain  New numbness, tingling and/or weakness in your legs  Difficulty with bowel or bladder or incontinence  **        Discharge Instructions - Orthopedics  Lillian Garcia 63 y o  female MRN: [id number]  Unit/Bed#: PACU 06    Weight Bearing Status:                                           Non weight bearing right lower extremity    DVT prophylaxis:  Complete course of Lovenox as directed    Pain:  Continue analgesics as directed    Showering Instructions:   Do not shower until followup     Dressing Instructions:   Change dressing daily with dry dressings    Driving Instructions:  No driving until cleared by Orthopaedic Surgery  PT/OT:  Continue PT/OT on outpatient basis as directed    Appt Instructions: If you do not have your appointment, please call the clinic at [phone]      Contact the office sooner if you experience any increased numbness/tingling in the extremities        Miscellaneous:  None
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
## 2018-09-30 NOTE — ASSESSMENT & PLAN NOTE
S/p IM nail and ORIF medial malleolus 9/28  Ortho signed off  NWB RLE, ok to d/c from ortho perspective  F/u PT for further discharge recs
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
## 2018-10-01 PROBLEM — E11.9 DIABETES MELLITUS (HCC): Status: ACTIVE | Noted: 2018-10-01

## 2018-10-01 PROBLEM — E87.6 HYPOKALEMIA: Status: RESOLVED | Noted: 2018-09-28 | Resolved: 2018-10-01

## 2018-10-01 LAB
ANION GAP SERPL CALCULATED.3IONS-SCNC: 5 MMOL/L (ref 4–13)
BASOPHILS # BLD AUTO: 0.04 THOUSANDS/ΜL (ref 0–0.1)
BASOPHILS NFR BLD AUTO: 1 % (ref 0–1)
BUN SERPL-MCNC: 10 MG/DL (ref 5–25)
CALCIUM SERPL-MCNC: 8.1 MG/DL (ref 8.3–10.1)
CHLORIDE SERPL-SCNC: 101 MMOL/L (ref 100–108)
CO2 SERPL-SCNC: 29 MMOL/L (ref 21–32)
CREAT SERPL-MCNC: 0.39 MG/DL (ref 0.6–1.3)
EOSINOPHIL # BLD AUTO: 0.07 THOUSAND/ΜL (ref 0–0.61)
EOSINOPHIL NFR BLD AUTO: 1 % (ref 0–6)
ERYTHROCYTE [DISTWIDTH] IN BLOOD BY AUTOMATED COUNT: 12.8 % (ref 11.6–15.1)
GFR SERPL CREATININE-BSD FRML MDRD: 117 ML/MIN/1.73SQ M
GLUCOSE SERPL-MCNC: 163 MG/DL (ref 65–140)
GLUCOSE SERPL-MCNC: 177 MG/DL (ref 65–140)
GLUCOSE SERPL-MCNC: 217 MG/DL (ref 65–140)
GLUCOSE SERPL-MCNC: 232 MG/DL (ref 65–140)
GLUCOSE SERPL-MCNC: 280 MG/DL (ref 65–140)
HCT VFR BLD AUTO: 23.5 % (ref 34.8–46.1)
HGB BLD-MCNC: 7.7 G/DL (ref 11.5–15.4)
IMM GRANULOCYTES # BLD AUTO: 0.02 THOUSAND/UL (ref 0–0.2)
IMM GRANULOCYTES NFR BLD AUTO: 0 % (ref 0–2)
LYMPHOCYTES # BLD AUTO: 2.04 THOUSANDS/ΜL (ref 0.6–4.47)
LYMPHOCYTES NFR BLD AUTO: 27 % (ref 14–44)
MCH RBC QN AUTO: 28.5 PG (ref 26.8–34.3)
MCHC RBC AUTO-ENTMCNC: 32.8 G/DL (ref 31.4–37.4)
MCV RBC AUTO: 87 FL (ref 82–98)
MONOCYTES # BLD AUTO: 0.46 THOUSAND/ΜL (ref 0.17–1.22)
MONOCYTES NFR BLD AUTO: 6 % (ref 4–12)
NEUTROPHILS # BLD AUTO: 4.9 THOUSANDS/ΜL (ref 1.85–7.62)
NEUTS SEG NFR BLD AUTO: 65 % (ref 43–75)
NRBC BLD AUTO-RTO: 0 /100 WBCS
PLATELET # BLD AUTO: 148 THOUSANDS/UL (ref 149–390)
PMV BLD AUTO: 11.6 FL (ref 8.9–12.7)
POTASSIUM SERPL-SCNC: 3.5 MMOL/L (ref 3.5–5.3)
RBC # BLD AUTO: 2.7 MILLION/UL (ref 3.81–5.12)
SODIUM SERPL-SCNC: 135 MMOL/L (ref 136–145)
WBC # BLD AUTO: 7.53 THOUSAND/UL (ref 4.31–10.16)

## 2018-10-01 PROCEDURE — 80048 BASIC METABOLIC PNL TOTAL CA: CPT | Performed by: ORTHOPAEDIC SURGERY

## 2018-10-01 PROCEDURE — G8978 MOBILITY CURRENT STATUS: HCPCS

## 2018-10-01 PROCEDURE — 85025 COMPLETE CBC W/AUTO DIFF WBC: CPT | Performed by: ORTHOPAEDIC SURGERY

## 2018-10-01 PROCEDURE — G8979 MOBILITY GOAL STATUS: HCPCS

## 2018-10-01 PROCEDURE — 97163 PT EVAL HIGH COMPLEX 45 MIN: CPT

## 2018-10-01 PROCEDURE — 82948 REAGENT STRIP/BLOOD GLUCOSE: CPT

## 2018-10-01 PROCEDURE — 94760 N-INVAS EAR/PLS OXIMETRY 1: CPT

## 2018-10-01 PROCEDURE — 99232 SBSQ HOSP IP/OBS MODERATE 35: CPT | Performed by: SURGERY

## 2018-10-01 RX ADMIN — SENNOSIDES AND DOCUSATE SODIUM 2 TABLET: 8.6; 5 TABLET ORAL at 09:02

## 2018-10-01 RX ADMIN — OXYCODONE HYDROCHLORIDE 10 MG: 10 TABLET ORAL at 06:10

## 2018-10-01 RX ADMIN — HEPARIN SODIUM 5000 UNITS: 5000 INJECTION, SOLUTION INTRAVENOUS; SUBCUTANEOUS at 14:22

## 2018-10-01 RX ADMIN — HEPARIN SODIUM 5000 UNITS: 5000 INJECTION, SOLUTION INTRAVENOUS; SUBCUTANEOUS at 21:39

## 2018-10-01 RX ADMIN — HEPARIN SODIUM 5000 UNITS: 5000 INJECTION, SOLUTION INTRAVENOUS; SUBCUTANEOUS at 06:05

## 2018-10-01 RX ADMIN — LIDOCAINE 1 PATCH: 50 PATCH CUTANEOUS at 09:02

## 2018-10-01 RX ADMIN — INSULIN LISPRO 12 UNITS: 100 INJECTION, SOLUTION INTRAVENOUS; SUBCUTANEOUS at 12:42

## 2018-10-01 RX ADMIN — GABAPENTIN 100 MG: 100 CAPSULE ORAL at 09:02

## 2018-10-01 RX ADMIN — INSULIN LISPRO 4 UNITS: 100 INJECTION, SOLUTION INTRAVENOUS; SUBCUTANEOUS at 09:02

## 2018-10-01 RX ADMIN — METHOCARBAMOL 500 MG: 500 TABLET ORAL at 06:05

## 2018-10-01 RX ADMIN — INSULIN LISPRO 8 UNITS: 100 INJECTION, SOLUTION INTRAVENOUS; SUBCUTANEOUS at 18:27

## 2018-10-01 RX ADMIN — OXYCODONE HYDROCHLORIDE 10 MG: 10 TABLET ORAL at 14:22

## 2018-10-01 RX ADMIN — METHOCARBAMOL 500 MG: 500 TABLET ORAL at 17:17

## 2018-10-01 RX ADMIN — OXYCODONE HYDROCHLORIDE 10 MG: 10 TABLET ORAL at 21:40

## 2018-10-01 RX ADMIN — LEVETIRACETAM 500 MG: 500 TABLET, FILM COATED ORAL at 09:01

## 2018-10-01 RX ADMIN — INSULIN LISPRO 8 UNITS: 100 INJECTION, SOLUTION INTRAVENOUS; SUBCUTANEOUS at 21:39

## 2018-10-01 RX ADMIN — LEVETIRACETAM 500 MG: 500 TABLET, FILM COATED ORAL at 21:40

## 2018-10-01 RX ADMIN — METHOCARBAMOL 500 MG: 500 TABLET ORAL at 12:39

## 2018-10-01 RX ADMIN — GABAPENTIN 100 MG: 100 CAPSULE ORAL at 17:17

## 2018-10-01 RX ADMIN — GABAPENTIN 100 MG: 100 CAPSULE ORAL at 21:40

## 2018-10-01 NOTE — ASSESSMENT & PLAN NOTE
S/p IM nail and ORIF medial malleolus 9/28  Ortho signed off  NWB RLE, ok to d/c from ortho perspective  Will need rehab - f/u CM
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
## 2018-10-01 NOTE — PROGRESS NOTES
Subjective: POD 3 status post ORIF right tibial IM nail with percutaneous fixation medial malleolus  Patient doing well this morning with no complaints  Has only gotten out of bed to the chair at this point  Objective:  Lab Results   Component Value Date/Time    WBC 7 53 10/01/2018 05:45 AM    HGB 7 7 (L) 10/01/2018 05:45 AM       Vitals:    10/01/18 0044   BP: 143/74   Pulse: 86   Resp: 18   Temp: 98 4 °F (36 9 °C)   SpO2: 97%     Right lower extremity  Dressing c/d/i with AO splint in place  Motor & sensation grossly intact distally  Moderate swelling  2+ DP    Assessment:  Postop day 3 status post right tibial IM nail and percutaneous which she has been medial malleolus    Plan:   Nonweightbearing right lower extremity AO splint  Monitor hemoglobin levels  Pain control  DVT ppx per primary team  PT  Dispo

## 2018-10-01 NOTE — ASSESSMENT & PLAN NOTE
Neurosurgery following  Plan for [facility] in 2 weeks  On SQH  On Keppra - 1 week duration
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
## 2018-10-01 NOTE — SOCIAL WORK
Pt recommended for rehab  Pt and family amenable to [facility]  CM will need to follow up for SNF choices  Pt has no insurance   [employer] paperwork submitted
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
## 2018-10-01 NOTE — ASSESSMENT & PLAN NOTE
Lab Results   Component Value Date    HGBA1C 9 3 (H) 09/28/2018       Recent Labs      09/30/18   0824  09/30/18   1142  09/30/18   1655  09/30/18   2045   POCGLU  223*  262*  211*  220*       Blood Sugar Average: Last 72 hrs:  (P) 243 25     Increased to Correction Algorithm 5  Continue diabetic diet w/ glucerna

## 2018-10-01 NOTE — ASSESSMENT & PLAN NOTE
T11-L2 Veretbral fractures  Neurosurgery following  Stable in brace on upright XR  TLSO when out of bed
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
## 2018-10-02 ENCOUNTER — HOSPITAL ENCOUNTER (INPATIENT)
Facility: HOSPITAL | Age: 58
LOS: 13 days | Discharge: HOME HEALTH | DRG: 949 | End: 2018-10-15
Attending: PHYSICAL MEDICINE & REHABILITATION | Admitting: PHYSICAL MEDICINE & REHABILITATION
Payer: COMMERCIAL

## 2018-10-02 VITALS
HEIGHT: 63 IN | OXYGEN SATURATION: 95 % | RESPIRATION RATE: 20 BRPM | WEIGHT: 173.5 LBS | DIASTOLIC BLOOD PRESSURE: 70 MMHG | TEMPERATURE: 98 F | SYSTOLIC BLOOD PRESSURE: 161 MMHG | HEART RATE: 88 BPM | BODY MASS INDEX: 30.74 KG/M2

## 2018-10-02 DIAGNOSIS — S82.201F: ICD-10-CM

## 2018-10-02 DIAGNOSIS — S22.009A CLOSED FRACTURE OF THORACIC VERTEBRAL BODY (HCC): ICD-10-CM

## 2018-10-02 DIAGNOSIS — S06.5X9A SUBDURAL HEMATOMA (HCC): Primary | ICD-10-CM

## 2018-10-02 DIAGNOSIS — S32.010D CLOSED COMPRESSION FRACTURE OF THORACOLUMBAR VERTEBRA WITH ROUTINE HEALING, SUBSEQUENT ENCOUNTER: ICD-10-CM

## 2018-10-02 DIAGNOSIS — S22.080D CLOSED COMPRESSION FRACTURE OF THORACOLUMBAR VERTEBRA WITH ROUTINE HEALING, SUBSEQUENT ENCOUNTER: ICD-10-CM

## 2018-10-02 DIAGNOSIS — I60.9 SUBARACHNOID BLEED (HCC): ICD-10-CM

## 2018-10-02 DIAGNOSIS — I10 HYPERTENSION: ICD-10-CM

## 2018-10-02 DIAGNOSIS — R26.2 AMBULATORY DYSFUNCTION: ICD-10-CM

## 2018-10-02 DIAGNOSIS — S32.009A CLOSED FRACTURE OF LUMBAR VERTEBRAL BODY (HCC): ICD-10-CM

## 2018-10-02 DIAGNOSIS — E11.9 DIABETES MELLITUS (HCC): ICD-10-CM

## 2018-10-02 DIAGNOSIS — S82.401F: ICD-10-CM

## 2018-10-02 LAB
GLUCOSE SERPL-MCNC: 221 MG/DL (ref 65–140)
GLUCOSE SERPL-MCNC: 231 MG/DL (ref 65–140)
GLUCOSE SERPL-MCNC: 256 MG/DL (ref 65–140)
GLUCOSE SERPL-MCNC: 295 MG/DL (ref 65–140)

## 2018-10-02 PROCEDURE — 97116 GAIT TRAINING THERAPY: CPT

## 2018-10-02 PROCEDURE — 82948 REAGENT STRIP/BLOOD GLUCOSE: CPT

## 2018-10-02 PROCEDURE — 97530 THERAPEUTIC ACTIVITIES: CPT

## 2018-10-02 PROCEDURE — 99024 POSTOP FOLLOW-UP VISIT: CPT | Performed by: ORTHOPAEDIC SURGERY

## 2018-10-02 PROCEDURE — 99238 HOSP IP/OBS DSCHRG MGMT 30/<: CPT | Performed by: SURGERY

## 2018-10-02 PROCEDURE — 99254 IP/OBS CNSLTJ NEW/EST MOD 60: CPT | Performed by: NURSE PRACTITIONER

## 2018-10-02 PROCEDURE — 97535 SELF CARE MNGMENT TRAINING: CPT

## 2018-10-02 PROCEDURE — 99222 1ST HOSP IP/OBS MODERATE 55: CPT | Performed by: PHYSICAL MEDICINE & REHABILITATION

## 2018-10-02 RX ORDER — LIDOCAINE 50 MG/G
1 PATCH TOPICAL DAILY
Status: CANCELLED | OUTPATIENT
Start: 2018-10-03

## 2018-10-02 RX ORDER — HEPARIN SODIUM 5000 [USP'U]/ML
5000 INJECTION, SOLUTION INTRAVENOUS; SUBCUTANEOUS EVERY 8 HOURS SCHEDULED
Status: CANCELLED | OUTPATIENT
Start: 2018-10-02

## 2018-10-02 RX ORDER — OXYCODONE HYDROCHLORIDE 10 MG/1
10 TABLET ORAL EVERY 4 HOURS PRN
Status: CANCELLED | OUTPATIENT
Start: 2018-10-02

## 2018-10-02 RX ORDER — LEVETIRACETAM 500 MG/1
500 TABLET ORAL EVERY 12 HOURS SCHEDULED
Status: CANCELLED | OUTPATIENT
Start: 2018-10-02

## 2018-10-02 RX ORDER — ACETAMINOPHEN 160 MG/5ML
650 SUSPENSION, ORAL (FINAL DOSE FORM) ORAL EVERY 4 HOURS PRN
Status: CANCELLED | OUTPATIENT
Start: 2018-10-02

## 2018-10-02 RX ORDER — HYDROMORPHONE HCL/PF 1 MG/ML
1 SYRINGE (ML) INJECTION
Status: DISCONTINUED | OUTPATIENT
Start: 2018-10-02 | End: 2018-10-02

## 2018-10-02 RX ORDER — OXYCODONE HYDROCHLORIDE 10 MG/1
10 TABLET ORAL EVERY 4 HOURS PRN
Status: DISCONTINUED | OUTPATIENT
Start: 2018-10-02 | End: 2018-10-04

## 2018-10-02 RX ORDER — ONDANSETRON 2 MG/ML
4 INJECTION INTRAMUSCULAR; INTRAVENOUS EVERY 4 HOURS PRN
Status: CANCELLED | OUTPATIENT
Start: 2018-10-02

## 2018-10-02 RX ORDER — ONDANSETRON 2 MG/ML
4 INJECTION INTRAMUSCULAR; INTRAVENOUS EVERY 4 HOURS PRN
Status: DISCONTINUED | OUTPATIENT
Start: 2018-10-02 | End: 2018-10-15 | Stop reason: HOSPADM

## 2018-10-02 RX ORDER — LIDOCAINE 50 MG/G
1 PATCH TOPICAL DAILY
Status: DISCONTINUED | OUTPATIENT
Start: 2018-10-03 | End: 2018-10-15 | Stop reason: HOSPADM

## 2018-10-02 RX ORDER — AMOXICILLIN 250 MG
2 CAPSULE ORAL DAILY
Status: CANCELLED | OUTPATIENT
Start: 2018-10-03

## 2018-10-02 RX ORDER — HYDROMORPHONE HCL/PF 1 MG/ML
1 SYRINGE (ML) INJECTION
Status: CANCELLED | OUTPATIENT
Start: 2018-10-02

## 2018-10-02 RX ORDER — LEVETIRACETAM 500 MG/1
500 TABLET ORAL EVERY 12 HOURS SCHEDULED
Status: DISCONTINUED | OUTPATIENT
Start: 2018-10-02 | End: 2018-10-09

## 2018-10-02 RX ORDER — METHOCARBAMOL 500 MG/1
500 TABLET, FILM COATED ORAL EVERY 6 HOURS SCHEDULED
Status: DISCONTINUED | OUTPATIENT
Start: 2018-10-02 | End: 2018-10-12

## 2018-10-02 RX ORDER — GABAPENTIN 100 MG/1
100 CAPSULE ORAL 3 TIMES DAILY
Status: DISCONTINUED | OUTPATIENT
Start: 2018-10-02 | End: 2018-10-12

## 2018-10-02 RX ORDER — OXYCODONE HYDROCHLORIDE 5 MG/1
5 TABLET ORAL EVERY 4 HOURS PRN
Status: CANCELLED | OUTPATIENT
Start: 2018-10-02

## 2018-10-02 RX ORDER — HEPARIN SODIUM 5000 [USP'U]/ML
5000 INJECTION, SOLUTION INTRAVENOUS; SUBCUTANEOUS EVERY 8 HOURS SCHEDULED
Status: DISCONTINUED | OUTPATIENT
Start: 2018-10-02 | End: 2018-10-03

## 2018-10-02 RX ORDER — METHOCARBAMOL 500 MG/1
500 TABLET, FILM COATED ORAL EVERY 6 HOURS SCHEDULED
Status: CANCELLED | OUTPATIENT
Start: 2018-10-02

## 2018-10-02 RX ORDER — AMOXICILLIN 250 MG
2 CAPSULE ORAL DAILY
Status: DISCONTINUED | OUTPATIENT
Start: 2018-10-03 | End: 2018-10-06

## 2018-10-02 RX ORDER — GABAPENTIN 100 MG/1
100 CAPSULE ORAL 3 TIMES DAILY
Status: CANCELLED | OUTPATIENT
Start: 2018-10-02

## 2018-10-02 RX ORDER — OXYCODONE HYDROCHLORIDE 5 MG/1
5 TABLET ORAL EVERY 4 HOURS PRN
Status: DISCONTINUED | OUTPATIENT
Start: 2018-10-02 | End: 2018-10-04

## 2018-10-02 RX ORDER — ACETAMINOPHEN 325 MG/1
650 TABLET ORAL EVERY 4 HOURS PRN
Status: DISCONTINUED | OUTPATIENT
Start: 2018-10-02 | End: 2018-10-15 | Stop reason: HOSPADM

## 2018-10-02 RX ADMIN — METHOCARBAMOL 500 MG: 500 TABLET ORAL at 05:31

## 2018-10-02 RX ADMIN — INSULIN LISPRO 8 UNITS: 100 INJECTION, SOLUTION INTRAVENOUS; SUBCUTANEOUS at 08:30

## 2018-10-02 RX ADMIN — GABAPENTIN 100 MG: 100 CAPSULE ORAL at 21:21

## 2018-10-02 RX ADMIN — METHOCARBAMOL 500 MG: 500 TABLET ORAL at 18:13

## 2018-10-02 RX ADMIN — HEPARIN SODIUM 5000 UNITS: 5000 INJECTION, SOLUTION INTRAVENOUS; SUBCUTANEOUS at 05:31

## 2018-10-02 RX ADMIN — OXYCODONE HYDROCHLORIDE 10 MG: 10 TABLET ORAL at 18:12

## 2018-10-02 RX ADMIN — LEVETIRACETAM 500 MG: 500 TABLET, FILM COATED ORAL at 08:29

## 2018-10-02 RX ADMIN — METHOCARBAMOL 500 MG: 500 TABLET ORAL at 12:42

## 2018-10-02 RX ADMIN — INSULIN LISPRO 12 UNITS: 100 INJECTION, SOLUTION INTRAVENOUS; SUBCUTANEOUS at 22:39

## 2018-10-02 RX ADMIN — HEPARIN SODIUM 5000 UNITS: 5000 INJECTION, SOLUTION INTRAVENOUS; SUBCUTANEOUS at 21:21

## 2018-10-02 RX ADMIN — INSULIN LISPRO 8 UNITS: 100 INJECTION, SOLUTION INTRAVENOUS; SUBCUTANEOUS at 12:43

## 2018-10-02 RX ADMIN — SENNOSIDES AND DOCUSATE SODIUM 2 TABLET: 8.6; 5 TABLET ORAL at 08:29

## 2018-10-02 RX ADMIN — LEVETIRACETAM 500 MG: 500 TABLET, FILM COATED ORAL at 21:21

## 2018-10-02 RX ADMIN — LIDOCAINE 1 PATCH: 50 PATCH CUTANEOUS at 08:30

## 2018-10-02 RX ADMIN — OXYCODONE HYDROCHLORIDE 10 MG: 10 TABLET ORAL at 08:29

## 2018-10-02 RX ADMIN — GABAPENTIN 100 MG: 100 CAPSULE ORAL at 08:29

## 2018-10-02 RX ADMIN — METHOCARBAMOL 500 MG: 500 TABLET ORAL at 00:20
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
## 2018-10-02 NOTE — ASSESSMENT & PLAN NOTE
Neurosurgery following  [facility]  On SQH  On Keppra - 1 week duration

## 2018-10-02 NOTE — OCCUPATIONAL THERAPY NOTE
Occupational Therapy Treatment Note:     10/02/18 1013   Restrictions/Precautions   Weight Bearing Precautions Per Order Yes   RUE Weight Bearing Per Order WBAT   LUE Weight Bearing Per Order WBAT   RLE Weight Bearing Per Order NWB   LLE Weight Bearing Per Order WBAT   Braces or Orthoses TLSO   Other Precautions Spinal precautions; Fall Risk;Pain;WBS   Pain Assessment   Pain Assessment FLACC   Pain Rating: FLACC (Rest) - Face 0   Pain Rating: FLACC (Rest) - Legs 0   Pain Rating: FLACC (Rest) - Activity 0   Pain Rating: FLACC (Rest) - Cry 0   Pain Rating: FLACC (Rest) - Consolability 0   Score: FLACC (Rest) 0   Pain Rating: FLACC (Activity) - Face 1   Pain Rating: FLACC (Activity) - Legs 0   Pain Rating: FLACC (Activity) - Activity 0   Pain Rating: FLACC (Activity) - Cry 1   Pain Rating: FLACC (Activity) - Consolability 0   Score: FLACC (Activity) 2   ADL   Where Assessed Chair   Grooming Assistance 5  Supervision/Setup   Grooming Deficit Setup   Grooming Comments brushing teeth   UB Bathing Assistance 5  Supervision/Setup   UB Bathing Deficit Setup   LB Bathing Assistance 3  Moderate Assistance   LB Bathing Deficit Setup;Perineal area; Buttocks;Right lower leg including foot   LB Bathing Comments pt  stood to wash jia area needing total asst for stance    UB Dressing Assistance 4  Minimal Assistance   UB Dressing Deficit Pull down in back;Pull around back;Pull over head;Setup   UB Dressing Comments Max Asst for donning TLSO   LB Dressing Assistance 3  Moderate Assistance   LB Dressing Deficit Pull up over hips; Thread RLE into pants; Increased time to complete   Bed Mobility   Rolling L 3  Moderate assistance   Additional items Assist x 2; Increased time required; Bedrails;Verbal cues;LE management   Supine to Sit 3  Moderate assistance   Additional items Assist x 2; Increased time required;LE management;Verbal cues   Transfers   Sit to Stand 3  Moderate assistance   Additional items Assist x 2;Assist x 3  (3rd RLE MGMT) Stand to Sit 3  Moderate assistance   Additional items Assist x 2;Assist x 3  (3rd RLE MGMT)   Stand pivot 3  Moderate assistance   Additional items Assist x 2;Assist x 3  (3rd RLE MGMT)   Cognition   Overall Cognitive Status WFL   Arousal/Participation Alert; Responsive; Cooperative   Attention Within functional limits   Following Commands Follows multistep commands without difficulty   Activity Tolerance   Activity Tolerance Patient tolerated treatment well   Assessment   Assessment pt  seen for AM OT session focusing on transfers, UB/LB dressing/bathing and bed mobility  pt  requires Mod x 2 for bed mobility  pt  performs sit <-> stand transfers and SPT with Mod A x 2-3 for RLE management to maintain WBS  pt  performs TLSO donning with Max Asst and received education on donning/doffing  pt  requires Supervision for grooming and UB bathing  pt  requires Mod Asst for LB bathing  pt  requires Min Asst for dressing and Mod Asst for LB dressing  pt  is pleasant and cooperative  pt 's niece present for session - pt  is [place] speaking but understands some English  pt  will continue to benefit from skilled OT services to regain independence  Plan   Treatment Interventions ADL retraining;Functional transfer training; Endurance training;Equipment evaluation/education;Patient/family training; Compensatory technique education; Energy conservation; Activityengagement   Goal Expiration Date 10/14/18   Treatment Day 1   OT Frequency 3-5x/wk   Recommendation   OT Discharge Recommendation Short Term Rehab   Barthel Index   Feeding 5   Bathing 0   Grooming Score 0   Dressing Score 5   Bladder Score 5   Bowels Score 10   Toilet Use Score 5   Transfers (Bed/Chair) Score 5   Mobility (Level Surface) Score 0   Stairs Score 0   Barthel Index Score 35   Modified [place] Scale   Modified [place] Scale 4   Elana Butt

## 2018-10-02 NOTE — PLAN OF CARE
Problem: OCCUPATIONAL THERAPY ADULT  Goal: Performs self-care activities at highest level of function for planned discharge setting  See evaluation for individualized goals  Treatment Interventions: ADL retraining, Functional transfer training, Endurance training, Cognitive reorientation, Patient/family training, Equipment evaluation/education, Compensatory technique education, Activityengagement          See flowsheet documentation for full assessment, interventions and recommendations  Outcome: Progressing  Limitation: Decreased ADL status, Decreased UE strength, Decreased Safe judgement during ADL, Decreased endurance, Decreased self-care trans, Decreased high-level ADLs  Prognosis: Good  Assessment: pt  seen for AM OT session focusing on transfers, UB/LB dressing/bathing and bed mobility  pt  requires Mod x 2 for bed mobility  pt  performs sit <-> stand transfers and SPT with Mod A x 2-3 for RLE management to maintain WBS  pt  performs TLSO donning with Max Asst and received education on donning/doffing  pt  requires Supervision for grooming and UB bathing  pt  requires Mod Asst for LB bathing  pt  requires Min Asst for dressing and Mod Asst for LB dressing  pt  is pleasant and cooperative  pt 's niece present for session - pt  is [place]ic speaking but understands some English  pt  will continue to benefit from skilled OT services to regain independence        OT Discharge Recommendation: Short Term Rehab     Marcela Olszewski

## 2018-10-02 NOTE — PHYSICAL THERAPY NOTE
PHYSICAL THERAPY PROGRESS NOTE          Patient Name: Emiliano Ormond  LUHFH'X Date: 10/2/2018     10/02/18 0947   Pain Assessment   Pain Assessment No/denies pain   [place] FACES Pain Rating 8   Pain Type Acute pain;Surgical pain   Pain Location Back;Leg   Pain Orientation Right   Hospital Pain Intervention(s) Repositioned; Ambulation/increased activity   Response to Interventions unchanged   Precautions   Spinal Precautions yes   Restrictions/Precautions   Weight Bearing Precautions Per Order Yes   RLE Weight Bearing Per Order NWB   Braces or Orthoses TLSO   Other Precautions Multiple lines;Telemetry; Fall Risk;Spinal precautions;Pain;WBS   General   Chart Reviewed Yes   Family/Caregiver Present Yes  (pt niece)   Cognition   Overall Cognitive Status WFL   Arousal/Participation Alert; Cooperative   Attention Within functional limits   Orientation Level Oriented X4   Following Commands Follows multistep commands without difficulty   Subjective   Subjective Pt only [place] speaking, pt neice present throughout session to translate   Bed Mobility   Rolling L 3  Moderate assistance   Additional items Assist x 2; Increased time required;Verbal cues;LE management   Supine to Sit 3  Moderate assistance   Additional items Assist x 2; Increased time required;Verbal cues;LE management   Additional Comments verbal and tactile cues for log rolling technique   Transfers   Sit to Stand 3  Moderate assistance   Additional items Assist x 2; Increased time required;Verbal cues   Stand to Sit 3  Moderate assistance   Additional items Assist x 2; Increased time required;Verbal cues   Additional Comments additional person to assist with maintaining weight bearing precautions   Ambulation/Elevation   Gait pattern (one leg hopping pattern)   Gait Assistance 3  Moderate assist   Additional items Assist x 2;Verbal cues; Tactile cues   Assistive Device Rolling walker   Distance 2 hops   Stair Management Assistance Not tested   Balance   Static Sitting Fair   Dynamic Sitting Fair -   Static Standing Poor +   Dynamic Standing Poor   Ambulatory Poor   Endurance Deficit   Endurance Deficit Yes   Endurance Deficit Description pain, fatigue   Activity Tolerance   Activity Tolerance Patient limited by fatigue;Patient limited by pain   Nurse Made Aware yes, MALINA Sandy   Assessment   Prognosis Fair   Problem List Decreased strength;Decreased range of motion;Decreased endurance; Impaired balance;Decreased mobility; Decreased skin integrity;Orthopedic restrictions;Pain   Assessment Pt participated in therapy session focusing on functional mobility tasks  Pt performed bed mobility with mod A x 2 and verbal and tactile cues for log rolling technique  Pt performed sit to stand transfer from elevated bed with mod A x 2 and additional person to maintain NWB precautions and verbal and tactile cues for technique and safety  Pt ambulated 2' with mod A x 2 with RW and verbal and tactile cues for technique and safety and additional person to assist with maintaining WB precautions  Pt displayed single leg hopping technique with RW and displayed decreased step length  Provided extensive education on log rolling technique, energy conservation techniques, safe functional mobility, TLSO management  Skilled physical therapy is indicated to address listed functional deficits focusing on strength, endurance and functional mobility  Barriers to Discharge Inaccessible home environment;Decreased caregiver support   Goals   Patient Goals none expressed   STG Expiration Date 10/15/18   Short Term Goal #1 Per PT eval 10/1: 1  Perform Rolling and supine<>sit with min A x 1 2  Perform sit<>stand transfers wit use of UE's and min A x 1  3  Perform stand pivot transfers with min A x 1 4  Maintain % of the time with all mobility 5  Amb 10ft with RW mod A x 1 6   Participate in LE ther-ex for general strengthening   Treatment Day 1   Plan   Treatment/Interventions Functional transfer training;LE strengthening/ROM; Endurance training;Patient/family training;Equipment eval/education; Bed mobility;Gait training; Compensatory technique education;Spoke to nursing;OT;Family   Progress Progressing toward goals   PT Frequency (3-6x/wk)   Recommendation   Recommendation Post acute IP rehab   Equipment Recommended Tariq Kinney; Wheelchair   PT - OK to Discharge Yes  (to rehab when medically stable)     Judie Méndez, PTA

## 2018-10-02 NOTE — PLAN OF CARE
Problem: PHYSICAL THERAPY ADULT  Goal: Performs mobility at highest level of function for planned discharge setting  See evaluation for individualized goals  Treatment/Interventions: Functional transfer training, LE strengthening/ROM, Therapeutic exercise, Endurance training, Bed mobility, Gait training, Spoke to nursing  Equipment Recommended: Marce Christine Wheel       See flowsheet documentation for full assessment, interventions and recommendations  Outcome: Progressing  Prognosis: Fair  Problem List: Decreased strength, Decreased range of motion, Decreased endurance, Impaired balance, Decreased mobility, Decreased skin integrity, Orthopedic restrictions, Pain  Assessment: Pt participated in therapy session focusing on functional mobility tasks  Pt performed bed mobility with mod A x 2 and verbal and tactile cues for log rolling technique  Pt performed sit to stand transfer from elevated bed with mod A x 2 and additional person to maintain NWB precautions and verbal and tactile cues for technique and safety  Pt ambulated 2' with mod A x 2 with RW and verbal and tactile cues for technique and safety and additional person to assist with maintaining WB precautions  Pt displayed single leg hopping technique with RW and displayed decreased step length  Provided extensive education on log rolling technique, energy conservation techniques, safe functional mobility, TLSO management  Skilled physical therapy is indicated to address listed functional deficits focusing on strength, endurance and functional mobility  Barriers to Discharge: Inaccessible home environment, Decreased caregiver support     Recommendation: Post acute IP rehab     PT - OK to Discharge: Yes (to rehab when medically stable)    See flowsheet documentation for full assessment

## 2018-10-02 NOTE — PROGRESS NOTES
Subjective: POD 4 s/p ORIF Right Tibial IMN with percutaneous fixation of medial malleolus  No complaints this morning and patient doing well       Objective:  Lab Results   Component Value Date/Time    WBC 7 53 10/01/2018 05:45 AM    HGB 7 7 (L) 10/01/2018 05:45 AM       Vitals:    10/01/18 2300   BP: 162/76   Pulse: 82   Resp: 18   Temp: 98 5 °F (36 9 °C)   SpO2: 93%     Right lower extremity  Dressing c/d/i  Motor & sensation grossly intact distally  Minimal swelling  2+ DP Pulse    Assessment: POD 4 s/p ORIF Right Tibial IMN with percutaneous fixation of medial malleolus    Plan:  NWB RLE in AO splint  Monitor hemoglobin orthopaedics  Pain control  DVT ppx  PT  Dispo
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
## 2018-10-03 PROBLEM — S82.401F: Status: ACTIVE | Noted: 2018-10-03

## 2018-10-03 PROBLEM — S32.010A CLOSED COMPRESSION FRACTURE OF THORACOLUMBAR VERTEBRA (HCC): Status: ACTIVE | Noted: 2018-10-03

## 2018-10-03 PROBLEM — S82.201F: Status: ACTIVE | Noted: 2018-10-03

## 2018-10-03 PROBLEM — S22.080A CLOSED COMPRESSION FRACTURE OF THORACOLUMBAR VERTEBRA (HCC): Status: ACTIVE | Noted: 2018-10-03

## 2018-10-03 PROBLEM — I10 HYPERTENSION: Status: ACTIVE | Noted: 2018-10-03

## 2018-10-03 LAB
GLUCOSE SERPL-MCNC: 210 MG/DL (ref 65–140)
GLUCOSE SERPL-MCNC: 241 MG/DL (ref 65–140)
GLUCOSE SERPL-MCNC: 244 MG/DL (ref 65–140)
GLUCOSE SERPL-MCNC: 253 MG/DL (ref 65–140)

## 2018-10-03 PROCEDURE — 97163 PT EVAL HIGH COMPLEX 45 MIN: CPT

## 2018-10-03 PROCEDURE — 97167 OT EVAL HIGH COMPLEX 60 MIN: CPT

## 2018-10-03 PROCEDURE — 97530 THERAPEUTIC ACTIVITIES: CPT

## 2018-10-03 PROCEDURE — 97542 WHEELCHAIR MNGMENT TRAINING: CPT

## 2018-10-03 PROCEDURE — 82948 REAGENT STRIP/BLOOD GLUCOSE: CPT

## 2018-10-03 PROCEDURE — 97110 THERAPEUTIC EXERCISES: CPT

## 2018-10-03 PROCEDURE — 97535 SELF CARE MNGMENT TRAINING: CPT

## 2018-10-03 PROCEDURE — 92523 SPEECH SOUND LANG COMPREHEN: CPT

## 2018-10-03 PROCEDURE — 97127 HB COGNITIVE SKILLS DEVELOPMENT, EACH 15 MUNUTES: CPT

## 2018-10-03 PROCEDURE — G0515 COGNITIVE SKILLS DEVELOPMENT: HCPCS

## 2018-10-03 PROCEDURE — 99232 SBSQ HOSP IP/OBS MODERATE 35: CPT | Performed by: PHYSICAL MEDICINE & REHABILITATION

## 2018-10-03 RX ORDER — LISINOPRIL 5 MG/1
5 TABLET ORAL DAILY
Status: DISCONTINUED | OUTPATIENT
Start: 2018-10-03 | End: 2018-10-04

## 2018-10-03 RX ADMIN — SENNOSIDES AND DOCUSATE SODIUM 2 TABLET: 8.6; 5 TABLET ORAL at 09:40

## 2018-10-03 RX ADMIN — OXYCODONE HYDROCHLORIDE 10 MG: 10 TABLET ORAL at 09:40

## 2018-10-03 RX ADMIN — LIDOCAINE 1 PATCH: 50 PATCH CUTANEOUS at 09:41

## 2018-10-03 RX ADMIN — METHOCARBAMOL 500 MG: 500 TABLET ORAL at 05:53

## 2018-10-03 RX ADMIN — METHOCARBAMOL 500 MG: 500 TABLET ORAL at 18:42

## 2018-10-03 RX ADMIN — HEPARIN SODIUM 5000 UNITS: 5000 INJECTION, SOLUTION INTRAVENOUS; SUBCUTANEOUS at 05:53

## 2018-10-03 RX ADMIN — INSULIN LISPRO 2 UNITS: 100 INJECTION, SOLUTION INTRAVENOUS; SUBCUTANEOUS at 22:06

## 2018-10-03 RX ADMIN — GABAPENTIN 100 MG: 100 CAPSULE ORAL at 09:40

## 2018-10-03 RX ADMIN — INSULIN LISPRO 3 UNITS: 100 INJECTION, SOLUTION INTRAVENOUS; SUBCUTANEOUS at 16:38

## 2018-10-03 RX ADMIN — ACETAMINOPHEN 650 MG: 325 TABLET, FILM COATED ORAL at 20:34

## 2018-10-03 RX ADMIN — OXYCODONE HYDROCHLORIDE 10 MG: 10 TABLET ORAL at 05:53

## 2018-10-03 RX ADMIN — LEVETIRACETAM 500 MG: 500 TABLET, FILM COATED ORAL at 22:05

## 2018-10-03 RX ADMIN — METFORMIN HYDROCHLORIDE 1000 MG: 500 TABLET ORAL at 16:38

## 2018-10-03 RX ADMIN — METHOCARBAMOL 500 MG: 500 TABLET ORAL at 23:09

## 2018-10-03 RX ADMIN — INSULIN LISPRO 2 UNITS: 100 INJECTION, SOLUTION INTRAVENOUS; SUBCUTANEOUS at 11:17

## 2018-10-03 RX ADMIN — METHOCARBAMOL 500 MG: 500 TABLET ORAL at 00:14

## 2018-10-03 RX ADMIN — GABAPENTIN 100 MG: 100 CAPSULE ORAL at 16:38

## 2018-10-03 RX ADMIN — ENOXAPARIN SODIUM 40 MG: 40 INJECTION SUBCUTANEOUS at 14:12

## 2018-10-03 RX ADMIN — OXYCODONE HYDROCHLORIDE 10 MG: 10 TABLET ORAL at 23:09

## 2018-10-03 RX ADMIN — METHOCARBAMOL 500 MG: 500 TABLET ORAL at 11:17

## 2018-10-03 RX ADMIN — OXYCODONE HYDROCHLORIDE 10 MG: 10 TABLET ORAL at 14:11

## 2018-10-03 RX ADMIN — LEVETIRACETAM 500 MG: 500 TABLET, FILM COATED ORAL at 09:40

## 2018-10-03 RX ADMIN — GABAPENTIN 100 MG: 100 CAPSULE ORAL at 22:05

## 2018-10-03 RX ADMIN — LISINOPRIL 5 MG: 5 TABLET ORAL at 14:12

## 2018-10-03 RX ADMIN — METFORMIN HYDROCHLORIDE 1000 MG: 500 TABLET ORAL at 11:17

## 2018-10-03 RX ADMIN — INSULIN LISPRO 8 UNITS: 100 INJECTION, SOLUTION INTRAVENOUS; SUBCUTANEOUS at 09:41

## 2018-10-03 RX ADMIN — OXYCODONE HYDROCHLORIDE 10 MG: 10 TABLET ORAL at 18:42
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
## 2018-10-03 NOTE — ASSESSMENT & PLAN NOTE
Temp:  [98 2 °F (36 8 °C)-98 8 °F (37 1 °C)] 98 2 °F (36 8 °C)  HR:  [78-80] 78  Resp:  [18] 18  BP: (110-130)/(60-66) 110/65    · Continue lisinopril 10mg daily, no changes today
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
## 2018-10-03 NOTE — NURSING NOTE
Clinically Significant Medication Issue  Time of Stay: ADMISSION    Did a complete drug regimen review identify potential clinically significant medication issues?     no

## 2018-10-03 NOTE — PROGRESS NOTES
SLP TAA       10/03/18 1310   Patient Data   Rehab Impairment Major Multiple Trauma   Etiologic Diagnosis Subdural hematoma, subarachnoid hemorrhage, L 5th-7th rib fx, T12-L2 compression fx, R open tib-fib fx   Date of Onset 09/28/18   Prior Level of Function   Functional Cognition 3  Independent - Patient completed the activities by him/herself, with or without an assistive device, with no assistance from a helper  Restrictions/Precautions   Precautions Bed/chair alarms; Fall Risk;Spinal precautions;Supervision on toilet/commode   RLE Weight Bearing Per Order NWB   Pain Assessment   Pain Assessment No/denies pain   Comprehension   Auditory Basic   Visual Basic   Findings Pt completing portions of the RIPA-G  Refer to SLP Rehab note for details  QI: Comprehension 3  Usually Understands: Understands most conversations, but misses some part/intent of message  Requires cues at times to understand  Comprehension (FIM) 4 - Understands basic info/conversation 75-90% of time   Expression   Verbal Basic;Complex   Non-Verbal Basic;Complex   Intelligibility Sentence   Findings Pt completing portions of the RIPA-G  Refer to SLP Rehab note for details  QI: Expression 4  Express complex messages without difficulty and with speech that is clear and easy to [employer]   Expression (FIM) 5 - Needs help/cues only RARELY (< 10% of the time)   Social Interaction   Cooperation with staff   Participation Individual   Behaviors observed Appropriate   Findings Pt completing portions of the RIPA-G  Refer to SLP Rehab note for details  Social Interaction (FIM) 5 - Interacts appropriately with others 90% of time   Problem Solving   Complex Manages medications   Routine Manages call bell   Findings Pt completing portions of the RIPA-G  Refer to SLP Rehab note for details      Problem solving (FIM) 3 - Solves basic problmes 50-74% of time   Memory   Initiates Tasks Yes   Short-Term Impaired   Long Term Intact   Findings Pt completing portions of the RIPA-G  Refer to SLP Rehab note for details  Memory (FIM) 3 - Recognizes, recalls/performs 50-74%   Discharge Information   Patient's Discharge Plan home w/ family support    Patient's Rehab Expectations "to get a better"    Barriers to Discharge Home Decreased Cognitive Function; Safety Considerations   Impressions Pt is a good rehab candidate to achieve supervision level for cognitive linguistic skills at time of d/c  Pt will benefit from SLP services to maximize skills at this time     SLP Therapy Minutes   SLP Time In 1310   SLP Time Out 1340   SLP Total Time (minutes) 30   SLP Mode of treatment - Individual (minutes) 30   SLP Mode of treatment - Concurrent (minutes) 0   SLP Mode of treatment - Group (minutes) 0   SLP Mode of treatment - Co-treat (minutes) 0   SLP Mode of Teatment - Total time(minutes) 30 minutes

## 2018-10-03 NOTE — ASSESSMENT & PLAN NOTE
· Conservative management recommended by neurosurgery  · F/u in PM&R clinic for post-concussion follow-up  · Continue keppra for seizure ppx  · Repeat CTOH performed yesterday; neurosurgery service contacted, they will perform 2 week f/u today

## 2018-10-03 NOTE — ASSESSMENT & PLAN NOTE
· Conservative management recommended by Neurosurgery  · Follow-up in [address] [place] [address] for postconcussion follow-up  · Continue keppra for seizure ppx

## 2018-10-03 NOTE — PROGRESS NOTES
SLP Cognitive Assessment       10/03/18 1310   Pain Assessment   Pain Assessment No/denies pain   Restrictions/Precautions   Precautions Bed/chair alarms; Fall Risk;Spinal precautions;Supervision on toilet/commode   RLE Weight Bearing Per Order NWB   Cognitive Linguisitic Assessments   Ross Information Processing Assessment - Geriatric (RIPA-G) Pt completing portions of the RIPA:G-2  The following subtest scores were completed: Immediate Recall: raw score of 25, which lead to a percentile rank of 24, indicating mild impairments; Temporal Information: raw score of 35, which lead to a percentile rank of 21, indicating mild impairments; Spatial Orientation: raw score of 43, which lead to a percentile rank of 52, indicating skills to be WNL; Situational Knowledge: raw score of 27, which lead to a percentile rank of 3, indicating moderate impairments  Of note, pt's dtr was present during assessment and did verbalize that there are memory changes in her mother at this time  Educated pt and pt's dtr about goals of SLP services at this time and will benefit from continued assessment to determine ongoing needs  Memory Skills   Memory (FIM) 3 - Recognizes, recalls/performs 50-74%   Social Interaction (FIM) 5 - Interacts appropriately with others 90% of time   Speech/Language/Cognition Assessmetn   Treatment Assessment Pt completing the RIPA-, where pt's is currently presenting w/ mild-moderate cognitive linguistic deficits at this time  Pt to benefit from SLP services to maximize overall cognitive linguistic skills  SLP Therapy Minutes   SLP Time In 1310   SLP Time Out 1340   SLP Total Time (minutes) 30   SLP Mode of treatment - Individual (minutes) 30   SLP Mode of treatment - Concurrent (minutes) 0   SLP Mode of treatment - Group (minutes) 0   SLP Mode of treatment - Co-treat (minutes) 0   SLP Mode of Teatment - Total time(minutes) 30 minutes   Therapy Time missed   Time missed?  No   Daily FIM Score   Problem solving (FIM) 3 - Solves basic problmes 50-74% of time   Comprehension (FIM) 4 - Understands basic info/conversation 75-90% of time   Expression (FIM) 5 - Needs help/cues only RARELY (< 10% of the time)

## 2018-10-03 NOTE — ASSESSMENT & PLAN NOTE
· Pain control  · Encourage incentive spirometry as rib fractures will limit inspiration due to pain
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
## 2018-10-03 NOTE — ASSESSMENT & PLAN NOTE
· Conservative management recommended by Neurosurgery  · 2 week f/u performed  Neurosurgery to perform f/u visit today
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
## 2018-10-03 NOTE — ASSESSMENT & PLAN NOTE
Results from last 7 days  Lab Units 10/14/18  0518 10/11/18  0525   HEMOGLOBIN g/dL 9 5* 9 1*     · Monitor CBC intermittently  · Transfuse for hemoglobin less than 7
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
## 2018-10-03 NOTE — ASSESSMENT & PLAN NOTE
· Status post washout right IM nailing on 9/28/18 by Dr Radha Fournier  · NWB currently  · 2 week f/u imaging performed, ortho to be contacted for f/u

## 2018-10-03 NOTE — ASSESSMENT & PLAN NOTE
Lab Results   Component Value Date    HGBA1C 9 3 (H) 09/28/2018     Recent Labs      10/14/18   1633  10/14/18   2120  10/15/18   0635  10/15/18   1044   POCGLU  109  147*  147*  195*     Blood Sugar Average: Last 72 hrs:  (P) 205 [id number]     · Continue ISS  · Continue metformin 1000mg BID, no changes today  · Continue Glimepiride 2mg daily, no changes today
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
## 2018-10-04 LAB
ANION GAP SERPL CALCULATED.3IONS-SCNC: 8 MMOL/L (ref 4–13)
BASOPHILS # BLD AUTO: 0.03 THOUSANDS/ΜL (ref 0–0.1)
BASOPHILS NFR BLD AUTO: 0 % (ref 0–1)
BUN SERPL-MCNC: 11 MG/DL (ref 5–25)
CALCIUM SERPL-MCNC: 8.8 MG/DL (ref 8.3–10.1)
CHLORIDE SERPL-SCNC: 99 MMOL/L (ref 100–108)
CO2 SERPL-SCNC: 28 MMOL/L (ref 21–32)
CREAT SERPL-MCNC: 0.48 MG/DL (ref 0.6–1.3)
EOSINOPHIL # BLD AUTO: 0.14 THOUSAND/ΜL (ref 0–0.61)
EOSINOPHIL NFR BLD AUTO: 2 % (ref 0–6)
ERYTHROCYTE [DISTWIDTH] IN BLOOD BY AUTOMATED COUNT: 12.9 % (ref 11.6–15.1)
GFR SERPL CREATININE-BSD FRML MDRD: 109 ML/MIN/1.73SQ M
GLUCOSE SERPL-MCNC: 216 MG/DL (ref 65–140)
GLUCOSE SERPL-MCNC: 222 MG/DL (ref 65–140)
GLUCOSE SERPL-MCNC: 235 MG/DL (ref 65–140)
GLUCOSE SERPL-MCNC: 245 MG/DL (ref 65–140)
GLUCOSE SERPL-MCNC: 307 MG/DL (ref 65–140)
HCT VFR BLD AUTO: 25.4 % (ref 34.8–46.1)
HGB BLD-MCNC: 8 G/DL (ref 11.5–15.4)
IMM GRANULOCYTES # BLD AUTO: 0.12 THOUSAND/UL (ref 0–0.2)
IMM GRANULOCYTES NFR BLD AUTO: 2 % (ref 0–2)
LYMPHOCYTES # BLD AUTO: 2.24 THOUSANDS/ΜL (ref 0.6–4.47)
LYMPHOCYTES NFR BLD AUTO: 31 % (ref 14–44)
MCH RBC QN AUTO: 27.6 PG (ref 26.8–34.3)
MCHC RBC AUTO-ENTMCNC: 31.5 G/DL (ref 31.4–37.4)
MCV RBC AUTO: 88 FL (ref 82–98)
MONOCYTES # BLD AUTO: 0.55 THOUSAND/ΜL (ref 0.17–1.22)
MONOCYTES NFR BLD AUTO: 8 % (ref 4–12)
NEUTROPHILS # BLD AUTO: 4.18 THOUSANDS/ΜL (ref 1.85–7.62)
NEUTS SEG NFR BLD AUTO: 57 % (ref 43–75)
NRBC BLD AUTO-RTO: 1 /100 WBCS
PLATELET # BLD AUTO: 286 THOUSANDS/UL (ref 149–390)
PMV BLD AUTO: 10.7 FL (ref 8.9–12.7)
POTASSIUM SERPL-SCNC: 3.9 MMOL/L (ref 3.5–5.3)
RBC # BLD AUTO: 2.9 MILLION/UL (ref 3.81–5.12)
SODIUM SERPL-SCNC: 135 MMOL/L (ref 136–145)
WBC # BLD AUTO: 7.26 THOUSAND/UL (ref 4.31–10.16)

## 2018-10-04 PROCEDURE — G0515 COGNITIVE SKILLS DEVELOPMENT: HCPCS

## 2018-10-04 PROCEDURE — 97542 WHEELCHAIR MNGMENT TRAINING: CPT

## 2018-10-04 PROCEDURE — 85025 COMPLETE CBC W/AUTO DIFF WBC: CPT | Performed by: NURSE PRACTITIONER

## 2018-10-04 PROCEDURE — 97110 THERAPEUTIC EXERCISES: CPT

## 2018-10-04 PROCEDURE — 97530 THERAPEUTIC ACTIVITIES: CPT

## 2018-10-04 PROCEDURE — 99232 SBSQ HOSP IP/OBS MODERATE 35: CPT | Performed by: PHYSICAL MEDICINE & REHABILITATION

## 2018-10-04 PROCEDURE — 80048 BASIC METABOLIC PNL TOTAL CA: CPT | Performed by: NURSE PRACTITIONER

## 2018-10-04 PROCEDURE — 97535 SELF CARE MNGMENT TRAINING: CPT

## 2018-10-04 PROCEDURE — 82948 REAGENT STRIP/BLOOD GLUCOSE: CPT

## 2018-10-04 PROCEDURE — 97127 HB COGNITIVE SKILLS DEVELOPMENT, EACH 15 MUNUTES: CPT

## 2018-10-04 RX ORDER — PRAVASTATIN SODIUM 20 MG
20 TABLET ORAL
Status: DISCONTINUED | OUTPATIENT
Start: 2018-10-04 | End: 2018-10-15 | Stop reason: HOSPADM

## 2018-10-04 RX ORDER — OXYCODONE HYDROCHLORIDE 5 MG/1
2.5 TABLET ORAL EVERY 4 HOURS PRN
Status: DISCONTINUED | OUTPATIENT
Start: 2018-10-04 | End: 2018-10-15 | Stop reason: HOSPADM

## 2018-10-04 RX ORDER — OXYCODONE HYDROCHLORIDE 5 MG/1
7.5 TABLET ORAL EVERY 4 HOURS PRN
Status: DISCONTINUED | OUTPATIENT
Start: 2018-10-04 | End: 2018-10-05

## 2018-10-04 RX ORDER — LISINOPRIL 10 MG/1
10 TABLET ORAL DAILY
Status: DISCONTINUED | OUTPATIENT
Start: 2018-10-05 | End: 2018-10-15 | Stop reason: HOSPADM

## 2018-10-04 RX ADMIN — METHOCARBAMOL 500 MG: 500 TABLET ORAL at 05:56

## 2018-10-04 RX ADMIN — OXYCODONE HYDROCHLORIDE 5 MG: 5 TABLET ORAL at 09:25

## 2018-10-04 RX ADMIN — INSULIN LISPRO 2 UNITS: 100 INJECTION, SOLUTION INTRAVENOUS; SUBCUTANEOUS at 21:39

## 2018-10-04 RX ADMIN — METHOCARBAMOL 500 MG: 500 TABLET ORAL at 18:14

## 2018-10-04 RX ADMIN — INSULIN LISPRO 3 UNITS: 100 INJECTION, SOLUTION INTRAVENOUS; SUBCUTANEOUS at 09:20

## 2018-10-04 RX ADMIN — GABAPENTIN 100 MG: 100 CAPSULE ORAL at 21:39

## 2018-10-04 RX ADMIN — INSULIN LISPRO 2 UNITS: 100 INJECTION, SOLUTION INTRAVENOUS; SUBCUTANEOUS at 17:00

## 2018-10-04 RX ADMIN — GABAPENTIN 100 MG: 100 CAPSULE ORAL at 09:24

## 2018-10-04 RX ADMIN — LISINOPRIL 5 MG: 5 TABLET ORAL at 09:23

## 2018-10-04 RX ADMIN — PRAVASTATIN SODIUM 20 MG: 20 TABLET ORAL at 17:00

## 2018-10-04 RX ADMIN — ENOXAPARIN SODIUM 40 MG: 40 INJECTION SUBCUTANEOUS at 09:27

## 2018-10-04 RX ADMIN — GABAPENTIN 100 MG: 100 CAPSULE ORAL at 17:00

## 2018-10-04 RX ADMIN — LIDOCAINE 1 PATCH: 50 PATCH CUTANEOUS at 09:28

## 2018-10-04 RX ADMIN — METFORMIN HYDROCHLORIDE 1000 MG: 500 TABLET ORAL at 09:22

## 2018-10-04 RX ADMIN — LEVETIRACETAM 500 MG: 500 TABLET, FILM COATED ORAL at 09:23

## 2018-10-04 RX ADMIN — METFORMIN HYDROCHLORIDE 1000 MG: 500 TABLET ORAL at 17:00

## 2018-10-04 RX ADMIN — METHOCARBAMOL 500 MG: 500 TABLET ORAL at 12:12

## 2018-10-04 RX ADMIN — SENNOSIDES AND DOCUSATE SODIUM 2 TABLET: 8.6; 5 TABLET ORAL at 09:24

## 2018-10-04 RX ADMIN — LEVETIRACETAM 500 MG: 500 TABLET, FILM COATED ORAL at 21:39

## 2018-10-04 RX ADMIN — INSULIN LISPRO 4 UNITS: 100 INJECTION, SOLUTION INTRAVENOUS; SUBCUTANEOUS at 12:03

## 2018-10-04 NOTE — PLAN OF CARE
Problem: INFECTION - ADULT  Goal: Absence of fever/infection during neutropenic period  INTERVENTIONS:  - Monitor WBC  - Implement neutropenic guidelines   Outcome: Completed Date Met: 10/03/18

## 2018-10-04 NOTE — PROGRESS NOTES
10/04/18 1000   Pain Assessment   Pain Assessment 0-10   Pain Score 5   Pain Location Back   Pain Orientation Bilateral   Hospital Pain Intervention(s) Repositioned;Distraction   Restrictions/Precautions   Precautions Bed/chair alarms;Cognitive; Fall Risk;Supervision on toilet/commode   RLE Weight Bearing Per Order NWB   Braces or Orthoses TLSO   Memory Skills   Memory (FIM) 4 - Recalls 2 of 3 steps   Social Interaction (FIM) 5 - Interacts appropriately with others 90% of time   Speech/Language/Cognition Assessmetn   Treatment Assessment Pt engaging in completing an additional subtest in the RIPA:G-2: Categorical Vocabulary: raw score is 35, leading to a percentile rank of 12, indicating mild impairments  However, when completing 4-step picture sequencing task, where pt required verbal cues to determine only one sequence (wrapping a present), but able to appropriately sequencing 5 different activites w/ 20/20 accuracy  When given 2 pictures to detemine safe vs  unsafe problem solving picture cards, pt was able to correctly ID safe vs  unsafe w/ 17/18  Pt given rational for safe vs  unsafe, pt was 17/18 accurate  Pt was noted to have increasing pain as session progressed where pt was able to complete slideboard transfer was min A physically, but able to follow directions w/o increased cues  Ability to doff TLSO brace was min A, needing verbal cues to correctly remover pieces in order of sequence  Pt's niece present once in room and education given as to SLP's roll and improvement noted w/ basic safety awareness  Pt to continue to benefit from SLP services at this time      SLP Therapy Minutes   SLP Time In 1000   SLP Time Out 1100   SLP Total Time (minutes) 60   SLP Mode of treatment - Individual (minutes) 60   SLP Mode of treatment - Concurrent (minutes) 0   SLP Mode of treatment - Group (minutes) 0   SLP Mode of treatment - Co-treat (minutes) 0   SLP Mode of Teatment - Total time(minutes) 60 minutes   Therapy Time missed   Time missed?  No   Daily FIM Score   Problem solving (FIM) 4 - Solves basic problems 75-89% of time   Comprehension (FIM) 4 - Understands basic info/conversation 75-90% of time   Expression (FIM) 5 - Needs help/cues only RARELY (< 10% of the time)

## 2018-10-04 NOTE — PROGRESS NOTES
Occupational Therapy Treatment Note     10/04/18 1230   Pain Assessment   Pain Score 4   Pain Type Acute pain   Pain Location Rib cage   Pain Orientation Right   Hospital Pain Intervention(s) Repositioned   Response to Interventions tolerates session    Restrictions/Precautions   Precautions Bed/chair alarms; Fall Risk;Cognitive;Supervision on toilet/commode;Spinal precautions   RLE Weight Bearing Per Order NWB   Braces or Orthoses TLSO   Lifestyle   Autonomy "I need to practice standing"    Grooming   Able To Wash/Dry Hands   Findings while seated at sink in w/c    Grooming (FIM) 5 - Patient requires supervision/monitoring   QI: Putting On/Taking Off Footwear   Assistance Needed Physical assistance   Assistance Provided by [place] Total assistance   Putting On/Taking Off Footwear CARE Score 1   Dressing/Undressing Clothing   Findings pt unable to don/doff (L) footwear    LB Dressing (FIM) 1 - [place] completely dressed patient   QI: Roll Left and Right   Assistance Needed Physical assistance   Assistance Provided by [place] 25%-49%   Roll Left and Right CARE Score 3   QI: Lying to Sitting on Side of Bed   Assistance Needed Physical assistance   Assistance Provided by [place] 50%-74%   Lying to Sitting on Side of Bed CARE Score 2   QI: Sit to Stand   Assistance Needed Physical assistance   Assistance Provided by [place] 75% or more   Sit to Stand CARE Score 2   QI: Chair/Bed-to-Chair Transfer   Assistance Needed Physical assistance   Assistance Provided by [place] Total assistance   Comment x2 stand pivot transfer    Chair/Bed-to-Chair Transfer CARE Score 1   Transfer Bed/Chair/Wheelchair   Limitations Noted In Balance;Confidence; Endurance;UE Strength;LE Strength;Pain Management   Adaptive Equipment Transfer Board; Roller Walker   Sit Pivot Minimal Assist;Assist x 1;Slide Board Transfer   Stand Pivot Maximum Assist;Assist x 1  (supervision of 2nd for safety )   Sit to Stand Moderate Assist;Assist x 1   Stand to Sit Moderate Assist;Assist x 1   Supine to Sit Moderate Assist;Assist x 1   Sit to Supine Minimal;Assist x 1   Findings Pt engaged in repetitive transfer training to increase independence in ADL tasks  Pt able to complete sliding board at min assist level, demonstrating progress  Pt completes sit <> stand transfer with boosting and lowering assistance  Pt completes stand pivot transfer using RW with mod/max assist x1 and supervision of 2nd for safety  Pt completes stand pivot transfer to (L) only, completes 5x progressing to mod assist x1 with RW     Bed, Chair, Wheelchair Transfer (FIM) 1 - Patient requires assist of two people   QI: [address] Needed Physical assistance   Assistance Provided by [place] 50%-74%   Toileting Hygiene CARE Score 2   Toileting   Able to [address] down yes, up no  Manage Hygiene Bladder   Adaptive Equipment Grab Bar   Findings pt tolerates stance with B/L UE support on grab bar and min assist for steadying  Pt requires assistance to manage clothing down, although attempts with B/L UE release and then requires max assist for steadying  OTR/L recommended to PCA for assist x2 for toileting in stance  Toileting (FIM) 2 - Patient completes 25-49% of all tasks   QI: Toilet Transfer   Assistance Needed Physical assistance   Assistance Provided by [place] 75% or more   Toilet Transfer CARE Score 2   Toilet Transfer   Surface Assessed Drop Arm Commode  (platform )   Transfer Technique Stand Pivot; Sit Pivot   Limitations Noted In Balance; Endurance; Safety;UE Strength;LE Strength   Adaptive Equipment Grab Bar   Findings Pt requires max assist to complete stand pivot transfer using RW from w/c  Pt requires max assist for sit <> stand from standard toilet  Pt then completes BSC transfer onto drop arm, platform, with sit pivot and max assist x1  Completes sit pivot transfer off of BSC with mod assist x2   OT currently recommending staff complete sit pivot transfer from W/C onto drop arm BSC with mod assist x1, assist x 2 present for toileting while pt stands for clothing management using RW  Then complete sit pivot transfer back to w/c  Toilet Transfer (FIM) 2 - [place] needs to lift or boost to rise AND assist to sit   Functional Standing Tolerance   Time 1 minute x4 trials with use of RW to increase pt's standing tolerance to complete clothing management over hips in stance    Exercise Tools   Exercise Tools Yes   UE Ergometer Pt engaged in UE strengthening using Ergometer to increase UB strength to increase performance during functional transfers  Pt completed prograde x8 minutes at level 4 0 resistance  Pt presents with good tolerance  Other Exercise Tool 1 Pt engaged in UE strengthening using 3# free weight to increase UB strength for increased independence and performance during functional transfers and overall ADL/IADL tasks  While seated in w/c pt completed 2 sets of 10 reps tricep curls and 1 set of 20 reps bicep curls  Cognition   Arousal/Participation Cooperative   Attention Within functional limits   Orientation Level Oriented to person;Oriented to place;Oriented to situation;Disoriented to time   Memory Decreased short term memory   Following Commands Follows one step commands with increased time or repetition   Comments pt engages in appropriate basic conversation, not oriented to date initially  OTR/L re-oriented pt to date x2, pt then able to recall immediately and s/p 5 minute delay  Pt able to immediately recall 3/3 words and s/p 2 minute delay  Pt will continue to benefit from repetitive training on new learning (ie: hand placement, transfers, etc )    Activity Tolerance   Activity Tolerance Patient tolerated treatment well   Assessment   Treatment Assessment Pt participated in skilled OT tx session focused on functional transfer training, standing tolerance, toileting/toilet transfers, UE strengthening  See above for further details on functional performance   Pt able to complete sliding board transfer at min assist level, began sit <> stand and stand pivot transfer to (L) using RW, pt with good carryover of (R) LE NWB, requires min cues, able to tolerate stance x1 minute 4 trials  OT currently recommending pt complete sit pivot transfer from w/c to drop arm platform BSC placed over toilet, then assist x2 in stance with pt using RW for clothing management, then complete sit pivot from [facility] back to w/c for safety  Pt will continue to benefit from skilled OT intervention to address standing tolerance/balance, UE strengthening, long handled adaptive equipment training, functional cognitive training, in order to maximize functional independence in ADLS, functional mobility/transfers and IADLS, while decreasing burden of care  Prognosis Good   Problem List Decreased strength;Decreased endurance; Impaired balance;Decreased mobility; Decreased cognition;Orthopedic restrictions   Plan   Treatment/Interventions Functional transfer training;ADL retraining; Therapeutic exercise; Endurance training;Cognitive reorientation;Patient/family training;Equipment eval/education; Bed mobility; Compensatory technique education   Progress Progressing toward goals   Recommendation   OT Discharge Recommendation (pending progress )   OT Therapy Minutes   OT Time In 1230   OT Time Out 1440   OT Total Time (minutes) 130   OT Mode of treatment - Individual (minutes) 130   OT Mode of treatment - Concurrent (minutes) 0   OT Mode of treatment - Group (minutes) 0   OT Mode of treatment - Co-treat (minutes) 0   OT Mode of Teatment - Total time(minutes) 130 minutes   Therapy Time missed   Time missed?  No       Yen Schafer MS, OTR/L , CBIS

## 2018-10-04 NOTE — PROGRESS NOTES
10/04/18 0900   Pain Assessment   Pain Assessment 0-10   Pain Score Worst Possible Pain   [place] FACES Pain Rating 4   Pain Type Acute pain   Pain Location Head;Leg;Rib cage   Pain Orientation Right;Bilateral   Hospital Pain Intervention(s) Other (Comment); Emotional support;Relaxation technique;Repositioned  (RN medicated pt at start of PT session)   Restrictions/Precautions   Precautions Bed/chair alarms; Fall Risk;Pain;Supervision on toilet/commode;Spinal precautions   RLE Weight Bearing Per Order NWB   Braces or Orthoses TLSO   Cognition   Overall Cognitive Status WFL   Arousal/Participation Cooperative;Lethargic   Attention Attends with cues to redirect   Orientation Level Oriented X4   Memory Decreased short term memory;Decreased recall of recent events;Decreased recall of precautions   Following Commands Follows one step commands with increased time or repetition   Subjective   Subjective pt reported 8-9/10 HA and 9-10/10 R LE pain but was agreeable to have PT  RN  notified of pt's complaints  MD Tele also made aware  QI: Roll Left and Right   Assistance Needed Physical assistance   Assistance Provided by [place] 25%-49%   Comment assist with R LE   Roll Left and Right CARE Score 3   QI: Lying to Sitting on Side of Bed   Assistance Needed Physical assistance   Assistance Provided by [place] 50%-74%   Comment cues on technique   Lying to Sitting on Side of Bed CARE Score 2   QI: Sit to Stand   Assistance Needed Physical assistance   Assistance Provided by [place] 75% or more   Comment max initially to mod A with subsequent transfer using RW for support   Sit to Stand CARE Score 2   QI: Chair/Bed-to-Chair Transfer   Assistance Needed Physical assistance   Assistance Provided by [place] 25%-49%   Comment slide board    Chair/Bed-to-Chair Transfer CARE Score 3   Transfer Bed/Chair/Wheelchair   Limitations Noted In LE Strength;UE Strength; Endurance;Balance;Problem Solving;Pain Management   Adaptive Equipment Transfer Board   Sit Pivot Minimal Assist   Sit to Stand Maximum Assist;Moderate Assist   Stand to Sit Moderate Assist   Supine to Sit Moderate Assist   Bed, Chair, Wheelchair Transfer (FIM) 2 - [place] needs to lift or boost to rise AND assist to sit   Ambulation   Primary Discharge Mode of Locomotion Wheelchair   Walking (FIM) 0 - Activity does not occur   QI: Wheel 50 Feet with Two Turns   Assistance Needed Supervision;Verbal cues   Wheel 50 Feet with Two Turns CARE Score 4   QI: Wheel 150 Feet   Assistance Needed Supervision;Verbal cues   Wheel 150 Feet CARE Score 4   Wheelchair mobility   QI: Does the patient use a wheelchair? 1  Yes   QI: Indicate the type of wheelchair 1  Manual   Wheelchair Assist Level Supervision   Method Right upper extremity; Left upper extremity   Needs Assist With Remove Leg Rest;Replace Leg Rest   Distance Level Surface (feet) 150 ft   Findings required short standing rest break , reports arms gets tired   Wheelchair (FIM) 5 - Patient requires supervision/monitoring AND wheels distance 150 feet or more, no rest   Therapeutic Interventions   Strengthening seated TE with 2# ankle wts on L LE and 0# on R LE  x 10 reps x 2 sets for hip flexion and LAQ   red tband for L hamstring curls x 10 reps x2 sets, L ankle pumps x 30 reps   Other standing tolerance training x 1 min then 2 mins requiring min with verbal cues to maintain NWB on R LE   Assessment   Treatment Assessment Attempted to see pt at 8:30 initially but was not ready as she was still finishing breakfast  Pt agreeable to have PT with second attempt at 9:00  Despite reported HA and R LE pain she was able to tolerate tx and  engaged in transfer training, standing tolerance training using walker for support while maintaining NWB precaution on R LE, w/c propulsion training and strengthening exercises   Pt did comment she feels sleepy when performing exercises - RN made aware, sleepiness could be pain meds induced, will continue to monitor  Pt improve indep with slide board transfer training to minimal assist today versus requiring mod A yesterday  Initially required max assist to stand up from w/c with R hand on the walker and L hand pushing from arm rest of w c with increased standing tolerance to 2 mins at best  PT will continue to focus on mobility training, increasing activity tolerance/ endurance and strengthening exercises to dec burden of care at d/c and reduce risk for falls  Family/Caregiver Present no   Problem List Decreased strength;Decreased range of motion;Decreased endurance; Impaired balance;Decreased mobility; Decreased safety awareness;Pain;Orthopedic restrictions; Obesity   Barriers to Discharge Inaccessible home environment;Decreased caregiver support   PT Barriers   Functional Limitation Car transfers; Ramp negotiation;Stair negotiation;Standing;Transfers; Walking; Wheelchair management   Plan   Treatment/Interventions Functional transfer training;LE strengthening/ROM; Bed mobility;Spoke to nursing;Spoke to MD;OT;Equipment eval/education;Patient/family training; Endurance training; Therapeutic exercise   Progress Progressing toward goals   Recommendation   Recommendation 24 hour supervision/assist;Home PT; Home with family support   Equipment Recommended Wheelchair;Walker   PT - OK to Discharge No   PT Therapy Minutes   PT Time In 0900   PT Time Out 1000   PT Total Time (minutes) 60   PT Mode of treatment - Individual (minutes) 60   PT Mode of treatment - Concurrent (minutes) 0   PT Mode of treatment - Group (minutes) 0   PT Mode of treatment - Co-treat (minutes) 0   PT Mode of Teatment - Total time(minutes) 60 minutes   Therapy Time missed   Time missed?  No
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
## 2018-10-05 LAB
GLUCOSE SERPL-MCNC: 161 MG/DL (ref 65–140)
GLUCOSE SERPL-MCNC: 245 MG/DL (ref 65–140)
GLUCOSE SERPL-MCNC: 272 MG/DL (ref 65–140)
GLUCOSE SERPL-MCNC: 292 MG/DL (ref 65–140)

## 2018-10-05 PROCEDURE — 97535 SELF CARE MNGMENT TRAINING: CPT

## 2018-10-05 PROCEDURE — 99232 SBSQ HOSP IP/OBS MODERATE 35: CPT | Performed by: PHYSICAL MEDICINE & REHABILITATION

## 2018-10-05 PROCEDURE — 97127 HB COGNITIVE SKILLS DEVELOPMENT, EACH 15 MUNUTES: CPT

## 2018-10-05 PROCEDURE — 82948 REAGENT STRIP/BLOOD GLUCOSE: CPT

## 2018-10-05 PROCEDURE — G0515 COGNITIVE SKILLS DEVELOPMENT: HCPCS

## 2018-10-05 PROCEDURE — 97110 THERAPEUTIC EXERCISES: CPT

## 2018-10-05 RX ORDER — OXYCODONE HYDROCHLORIDE 5 MG/1
5 TABLET ORAL EVERY 4 HOURS PRN
Status: DISCONTINUED | OUTPATIENT
Start: 2018-10-05 | End: 2018-10-15 | Stop reason: HOSPADM

## 2018-10-05 RX ORDER — DIPHENOXYLATE HYDROCHLORIDE AND ATROPINE SULFATE 2.5; .025 MG/1; MG/1
1 TABLET ORAL EVERY 8 HOURS PRN
Status: DISCONTINUED | OUTPATIENT
Start: 2018-10-05 | End: 2018-10-15 | Stop reason: HOSPADM

## 2018-10-05 RX ORDER — GLIMEPIRIDE 1 MG/1
1 TABLET ORAL
Status: DISCONTINUED | OUTPATIENT
Start: 2018-10-05 | End: 2018-10-07

## 2018-10-05 RX ADMIN — GLIMEPIRIDE 1 MG: 1 TABLET ORAL at 09:03

## 2018-10-05 RX ADMIN — LISINOPRIL 10 MG: 10 TABLET ORAL at 09:20

## 2018-10-05 RX ADMIN — SENNOSIDES AND DOCUSATE SODIUM 2 TABLET: 8.6; 5 TABLET ORAL at 09:18

## 2018-10-05 RX ADMIN — METHOCARBAMOL 500 MG: 500 TABLET ORAL at 18:24

## 2018-10-05 RX ADMIN — GABAPENTIN 100 MG: 100 CAPSULE ORAL at 09:15

## 2018-10-05 RX ADMIN — LEVETIRACETAM 500 MG: 500 TABLET, FILM COATED ORAL at 21:17

## 2018-10-05 RX ADMIN — METHOCARBAMOL 500 MG: 500 TABLET ORAL at 06:45

## 2018-10-05 RX ADMIN — ACETAMINOPHEN 650 MG: 325 TABLET, FILM COATED ORAL at 09:21

## 2018-10-05 RX ADMIN — INSULIN LISPRO 2 UNITS: 100 INJECTION, SOLUTION INTRAVENOUS; SUBCUTANEOUS at 21:21

## 2018-10-05 RX ADMIN — GABAPENTIN 100 MG: 100 CAPSULE ORAL at 21:17

## 2018-10-05 RX ADMIN — METFORMIN HYDROCHLORIDE 1000 MG: 500 TABLET ORAL at 09:11

## 2018-10-05 RX ADMIN — METHOCARBAMOL 500 MG: 500 TABLET ORAL at 00:57

## 2018-10-05 RX ADMIN — GABAPENTIN 100 MG: 100 CAPSULE ORAL at 16:30

## 2018-10-05 RX ADMIN — METHOCARBAMOL 500 MG: 500 TABLET ORAL at 12:22

## 2018-10-05 RX ADMIN — INSULIN LISPRO 1 UNITS: 100 INJECTION, SOLUTION INTRAVENOUS; SUBCUTANEOUS at 16:31

## 2018-10-05 RX ADMIN — LEVETIRACETAM 500 MG: 500 TABLET, FILM COATED ORAL at 09:18

## 2018-10-05 RX ADMIN — METFORMIN HYDROCHLORIDE 1000 MG: 500 TABLET ORAL at 16:30

## 2018-10-05 RX ADMIN — ENOXAPARIN SODIUM 40 MG: 40 INJECTION SUBCUTANEOUS at 09:21

## 2018-10-05 RX ADMIN — OXYCODONE HYDROCHLORIDE 5 MG: 5 TABLET ORAL at 13:06

## 2018-10-05 RX ADMIN — LIDOCAINE 1 PATCH: 50 PATCH CUTANEOUS at 09:22

## 2018-10-05 RX ADMIN — INSULIN LISPRO 3 UNITS: 100 INJECTION, SOLUTION INTRAVENOUS; SUBCUTANEOUS at 09:04

## 2018-10-05 RX ADMIN — DIPHENOXYLATE HYDROCHLORIDE AND ATROPINE SULFATE 1 TABLET: 2.5; .025 TABLET ORAL at 21:17

## 2018-10-05 RX ADMIN — INSULIN LISPRO 4 UNITS: 100 INJECTION, SOLUTION INTRAVENOUS; SUBCUTANEOUS at 12:22

## 2018-10-05 RX ADMIN — PRAVASTATIN SODIUM 20 MG: 20 TABLET ORAL at 16:30

## 2018-10-05 NOTE — PROGRESS NOTES
10/05/18 0930   Pain Assessment   Pain Assessment No/denies pain   Restrictions/Precautions   Precautions Bed/chair alarms;Cognitive; Fall Risk;Supervision on toilet/commode;Spinal precautions   RLE Weight Bearing Per Order NWB   Braces or Orthoses TLSO   Memory Skills   Memory (FIM) 4 - Recalls 2 of 3 steps   Social Interaction (FIM) 5 - Interacts appropriately with others 90% of time   Speech/Language/Cognition Assessmetn   Treatment Assessment Session began, where pt was stating being tired but agreeable to partcipate in completing session  Pt doning TLSO was min A overall, and ability to transfer from bed to [facility] using slide boards was mod A, but overall cueing to complete transfer and recalling NWB status to RLE was improved  Pt then requesting to toilet before session, where pt did require increased verbal cues to comply w/ transfer  Ability to be NWB upon moments before sitting on toilet was [facility], but when finishing task to complete pants up and to maintain NWB was decreased needing physical cue from SLP to not place pressure onto RLE  Pt then engaging in completing written concrete categorization task in Armenian, where pt was 20/30 in placing words into 6 categories  Of note, pt thought she had completed task, but missed 10 items to include  Continued to require verbal cues to place all items into appropriate categories, where pt remains errored on 3/10 remaining words  Pt's STM recall of daily events was supervision level for recalling visitors from yesterday and last meal (breakfast) from today  Pt was oriented x4 today  Lastly, pt completing reading comprehension task given medicine label and pt to answer questions about that information  Pt noted to fatigue as task presented due to decreased comprehension answering task  Increased verbal cues needed to complete task, where improvement note in written responses, but when answering the remaining questions, pt was verbally giving correct responses   Will continue to follow up to maximize cognitive lingusitic skills at this time  SLP Therapy Minutes   SLP Time In 0930   SLP Time Out 1030   SLP Total Time (minutes) 60   SLP Mode of treatment - Individual (minutes) 60   SLP Mode of treatment - Concurrent (minutes) 0   SLP Mode of treatment - Group (minutes) 0   SLP Mode of treatment - Co-treat (minutes) 0   SLP Mode of Teatment - Total time(minutes) 60 minutes   Therapy Time missed   Time missed?  No   Daily FIM Score   Problem solving (FIM) 3 - Solves basic problmes 50-74% of time   Comprehension (FIM) 4 - Understands basic info/conversation 75-90% of time   Expression (FIM) 5 - Needs help/cues only RARELY (< 10% of the time)
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
## 2018-10-05 NOTE — PROGRESS NOTES
10/05/18 0700   Pain Assessment   Pain Assessment 0-10   Pain Score Worst Possible Pain   Restrictions/Precautions   Precautions Bed/chair alarms;Cognitive; Fall Risk;Spinal precautions;Supervision on toilet/commode   RLE Weight Bearing Per Order NWB   Braces or Orthoses TLSO   QI: Eating   Assistance Needed Independent   Assistance Provided by [place] No physical assistance   Eating CARE Score 6   Eating Assessment   Eating (FIM) 7 - Patient completely independent   QI: Oral Hygiene   Assistance Needed Set-up / [address] Provided by [place] No physical assistance   Oral Hygiene CARE Score 5   Grooming   Able To Initiate Tasks; Acquire Items;Comb/Brush Hair;Wash/Dry Face;Brush/Clean Teeth;Wash/Dry Hands   Findings seated at EOB  Grooming (FIM) 5 - Patient requires supervision/monitoring   QI: Shower/Bathe Self   Assistance Needed Physical assistance   Shower/Bathe Self CARE Score -   Bathing   Assessed Bath Style Sponge Bath   Anticipated D/C Bath Style Sponge Bath   Able to Gather/Transport No   Able to [employer] Temperature No   Able to Wash/Rinse/Dry (body part) Left Arm;Right Arm;L Upper Leg;R Upper Leg;R Lower Leg/Foot;Chest;Abdomen;Perineal Area   Limitations Noted in Balance; Endurance;Strength   Positioning Seated;Standing   Findings  Pt requires A to complete rear hygiene  Limited by decreased dynamic reach below waist and decreased ROM on RLE      Bathing (FIM) 4 - Patient completes 8/10 or 9/10 parts   Tub/Shower Transfer   Not Assessed Safety;Sponge Bath   Tub Transfer (FIM) 0 - Activity does not occur   Shower Transfer (FIM) 0 - Activity does not occur   QI: Upper Body Dressing   Assistance Needed Set-up / [address] Provided by [place] No physical assistance   Upper Body Dressing CARE Score 5   QI: Lower Body Dressing   Assistance Needed Physical assistance   Assistance Provided by [place] Total assistance   Comment Pt requires VC's to dress affected RLE first and requires A for CM over hips while in stance  Pt requires SBA x 1 and modA of another at this time to ensure compliance of NWB to RLE  Lower Body Dressing CARE Score 1   QI: Putting On/Taking Off Footwear   Assistance Needed Physical assistance   Assistance Provided by [place] 50%-74%   Comment Pt is able to don/doff LLE sock but requires A for RLE  Putting On/Taking Off Footwear CARE Score 2   Dressing/Undressing Clothing   Remove UB Clothes Pullover Shirt   Remove LB Clothes Pants; Undergarment;Socks   [address]; Undergarment;Socks   Limitations Noted In Balance; Endurance; Safety;Strength   Positioning Sit Edge Of Bed;Standing   UB Dressing (FIM) 5 - Patient requires supervision/monitoring   LB Dressing (FIM) 1 - Patient requires two helpers   QI: Sit to Stand   Assistance Needed Physical assistance   Assistance Provided by [place] 75% or more   Sit to Stand CARE Score 2   QI: Chair/Bed-to-Chair Transfer   Assistance Needed Physical assistance   Assistance Provided by [place] 75% or more   Chair/Bed-to-Chair Transfer CARE Score 2   Transfer Bed/Chair/Wheelchair   Bed, Chair, Wheelchair Transfer (FIM) 2 - [place] needs to lift or boost to rise AND assist to sit   QI: [address] Provided by [place] No physical assistance   Comment Seated with lateral weight shifts on commode  Toileting Hygiene CARE Score 4   Toileting   Able to [address] down yes, up no  Manage Hygiene Bladder   Adaptive Equipment Grab Bar   Toileting (FIM) 3 - Patient completes  50-74% of all tasks   QI: Toilet Transfer   Assistance Needed Physical assistance   Assistance Provided by [place] 75% or more   Toilet Transfer CARE Score 2   Toilet Transfer   Surface Assessed Drop Arm Commode   Transfer Technique Stand Pivot   Limitations Noted In Balance; Endurance; Safety;UE Strength;LE Strength   Adaptive Equipment Grab Bar   Toilet Transfer (FIM) 2 - [place] needs to lift or boost to rise AND assist to sit   Exercise Tools   Other Exercise Tool 1 UB strengthening completed with use of 3# dowel bar moving through all planes 3 x 10 each to increase UB strength and endurance for increased independence with ADL tasks and functional transfers  Cognition   Overall Cognitive Status WFL   Arousal/Participation Cooperative   Attention Within functional limits   Orientation Level Oriented to person;Oriented to place;Oriented to situation   Memory Decreased short term memory   Following Commands Follows one step commands with increased time or repetition   Activity Tolerance   Activity Tolerance Patient tolerated treatment well   Assessment   Treatment Assessment Pt participated in skilled OT services with focus on ADL retraining, functional transfers, and strengthening  Pt engaged in ADL session  Pt demos increased independence with UB dressing requiring only setup and A to don TLSO  Pt also demos improvement with threading BLEs into pants, she continues to require A x 2 at this time for CM over hips for safety and to ensure compliance of NWB to RLE  Pt requires maxA for all functional sit pivot/stand pivot transfers for safety and for proper technique  Pt will continue to benefit from skilled OT services with focus on functional transfers, LB dressing, and strengthening  Prognosis Good   Problem List Decreased strength;Decreased endurance; Impaired balance;Decreased mobility; Decreased cognition;Orthopedic restrictions   Plan   Treatment/Interventions ADL retraining;Functional transfer training; Therapeutic exercise; Endurance training;Equipment eval/education; Compensatory technique education   Progress Progressing toward goals   Recommendation   OT Discharge Recommendation Home with family support   OT Therapy Minutes   OT Time In 0700   OT Time Out 0830   OT Total Time (minutes) 90   OT Mode of treatment - Individual (minutes) 90   OT Mode of treatment - Concurrent (minutes) 0   OT Mode of treatment - Group (minutes) 0   OT Mode of treatment - Co-treat (minutes) 0   OT Mode of Teatment - Total time(minutes) 90 minutes   Therapy Time missed   Time missed?  No
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
## 2018-10-06 LAB
GLUCOSE SERPL-MCNC: 139 MG/DL (ref 65–140)
GLUCOSE SERPL-MCNC: 203 MG/DL (ref 65–140)
GLUCOSE SERPL-MCNC: 207 MG/DL (ref 65–140)
GLUCOSE SERPL-MCNC: 246 MG/DL (ref 65–140)

## 2018-10-06 PROCEDURE — 82948 REAGENT STRIP/BLOOD GLUCOSE: CPT

## 2018-10-06 PROCEDURE — 97542 WHEELCHAIR MNGMENT TRAINING: CPT

## 2018-10-06 PROCEDURE — 97530 THERAPEUTIC ACTIVITIES: CPT

## 2018-10-06 PROCEDURE — 97110 THERAPEUTIC EXERCISES: CPT

## 2018-10-06 RX ORDER — AMOXICILLIN 250 MG
2 CAPSULE ORAL DAILY PRN
Status: DISCONTINUED | OUTPATIENT
Start: 2018-10-06 | End: 2018-10-15 | Stop reason: HOSPADM

## 2018-10-06 RX ADMIN — GABAPENTIN 100 MG: 100 CAPSULE ORAL at 16:02

## 2018-10-06 RX ADMIN — LIDOCAINE 1 PATCH: 50 PATCH CUTANEOUS at 08:21

## 2018-10-06 RX ADMIN — INSULIN LISPRO 2 UNITS: 100 INJECTION, SOLUTION INTRAVENOUS; SUBCUTANEOUS at 08:23

## 2018-10-06 RX ADMIN — GLIMEPIRIDE 1 MG: 1 TABLET ORAL at 08:24

## 2018-10-06 RX ADMIN — LEVETIRACETAM 500 MG: 500 TABLET, FILM COATED ORAL at 08:21

## 2018-10-06 RX ADMIN — LISINOPRIL 10 MG: 10 TABLET ORAL at 08:20

## 2018-10-06 RX ADMIN — METHOCARBAMOL 500 MG: 500 TABLET ORAL at 11:37

## 2018-10-06 RX ADMIN — LEVETIRACETAM 500 MG: 500 TABLET, FILM COATED ORAL at 21:30

## 2018-10-06 RX ADMIN — METFORMIN HYDROCHLORIDE 1000 MG: 500 TABLET ORAL at 16:02

## 2018-10-06 RX ADMIN — INSULIN LISPRO 3 UNITS: 100 INJECTION, SOLUTION INTRAVENOUS; SUBCUTANEOUS at 11:37

## 2018-10-06 RX ADMIN — METHOCARBAMOL 500 MG: 500 TABLET ORAL at 00:07

## 2018-10-06 RX ADMIN — METHOCARBAMOL 500 MG: 500 TABLET ORAL at 17:25

## 2018-10-06 RX ADMIN — GABAPENTIN 100 MG: 100 CAPSULE ORAL at 08:19

## 2018-10-06 RX ADMIN — PRAVASTATIN SODIUM 20 MG: 20 TABLET ORAL at 16:02

## 2018-10-06 RX ADMIN — METHOCARBAMOL 500 MG: 500 TABLET ORAL at 06:03

## 2018-10-06 RX ADMIN — METFORMIN HYDROCHLORIDE 1000 MG: 500 TABLET ORAL at 08:20

## 2018-10-06 RX ADMIN — ENOXAPARIN SODIUM 40 MG: 40 INJECTION SUBCUTANEOUS at 08:22

## 2018-10-06 RX ADMIN — INSULIN LISPRO 1 UNITS: 100 INJECTION, SOLUTION INTRAVENOUS; SUBCUTANEOUS at 21:30

## 2018-10-06 RX ADMIN — METHOCARBAMOL 500 MG: 500 TABLET ORAL at 23:56

## 2018-10-06 RX ADMIN — GABAPENTIN 100 MG: 100 CAPSULE ORAL at 21:30

## 2018-10-06 RX ADMIN — OXYCODONE HYDROCHLORIDE 5 MG: 5 TABLET ORAL at 08:42

## 2018-10-06 NOTE — PROGRESS NOTES
Occupational Therapy Treatment Note       10/06/18 1230   Pain Assessment   Pain Assessment No/denies pain   Restrictions/Precautions   Precautions Cognitive;Bed/chair alarms; Fall Risk;Supervision on toilet/commode;Pain;Spinal precautions; Seizure   RLE Weight Bearing Per Order NWB   Braces or Orthoses TLSO   Lifestyle   Autonomy "Okay" - pt verbalized understanding that she must call staff for assistance in transfers, family may not transfer pt alone  QI: Sit to Stand   Assistance Needed Physical assistance   Assistance Provided by [place] 25%-49%   Comment RW   Sit to Stand CARE Score 3   QI: Chair/Bed-to-Chair Transfer   Assistance Needed Physical assistance   Assistance Provided by [place] 50%-74%   Comment RW   Chair/Bed-to-Chair Transfer CARE Score 2   Transfer Bed/Chair/Wheelchair   Limitations Noted In Balance; Endurance;UE Strength;LE Strength   Adaptive Equipment Roller Walker   Stand Pivot Moderate Assist;Assist x 1   Sit to Stand Minimal;Assist x 1   Stand to Sit Minimal;Assist x 1   Findings Pt engaged in functional transfer training, completed sit <> stand transfer 5x from w/c with min assist x1 and use of RW  Pt completed stand pivot transfer to (R) from w/c to recliner with mod assist x1  Bed, Chair, Wheelchair Transfer (FIM) 3 - [place] needs to lift, boost or assist to stand OR sit   Functional Standing Tolerance   Time Pt tolerated stance 4 trials x30 seconds each, able to maintain (R) LE NWB throughout  Standing tolerance completed to increase pt's independence in LB ADLS and toileting    Exercise Tools   UE Ergometer Pt engaged in UE strengthening using Ergometer to increase UB strength to increase performance during functional transfers to increase independence during ADLS  Pt completed prograde x5 minutes at level 5 0 resistance  Pt presents with good tolerance      Cognition   Arousal/Participation Cooperative   Attention Within functional limits   Memory Decreased recall of precautions Following Commands Follows one step commands without difficulty   Comments pt requires verbal cues for locking of w/c brakes and supervision during w/c mobility for safety  Pt demonstrated ability to lock brakes, but requires cues for carryover  Activity Tolerance   Activity Tolerance Patient tolerated treatment well   Medical Staff Made Aware Rehab aide Snow Hitchcock reported that this AM she witnessed pt's sister attempting to transfer pt without staff present, and safety concerns with w/c were noted  PT [address] spoke with AM to pt and sister regarding staff member must be present for all transfers  OTR/L reinforced this to pt and family this PM, as well as need for w/c brakes to always be locked when pt is not completing w/c mobility  Pt placed on chair alarm at end of session  Assessment   Treatment Assessment Pt participated in 30 minute skilled OT tx session focused on UE strengthening and functional transfer training  See above for further details on functional performance  Pt able to complete sit <> stand with min assist x1 and RW, demonstrating progress  Pt continues to require verbal cues for safety while using w/c  Pt and pt's family educated that staff member must be present for all transfers  Pt remains on alarms  Pt will continue to benefit from skilled OT intervention to address UE strengthening, standing balance/tolerance with UE release, repetitive training on w/c management and safety techniques, in order to maximize functional independence in ADLS, functional mobility/transfers and IADLS, while decreasing burden of care  Prognosis Good   Problem List Decreased strength;Decreased endurance; Impaired balance;Decreased mobility; Decreased safety awareness;Pain;Orthopedic restrictions   Plan   Treatment/Interventions ADL retraining;Functional transfer training; Therapeutic exercise; Endurance training;Cognitive reorientation;Patient/family training;Equipment eval/education; Bed mobility; Compensatory technique education   Progress Progressing toward goals   Recommendation   OT Discharge Recommendation (pending progress )   OT Therapy Minutes   OT Time In 1230   OT Time Out 1300   OT Total Time (minutes) 30   OT Mode of treatment - Individual (minutes) 30   OT Mode of treatment - Concurrent (minutes) 0   OT Mode of treatment - Group (minutes) 0   OT Mode of treatment - Co-treat (minutes) 0   OT Mode of Teatment - Total time(minutes) 30 minutes   Therapy Time missed   Time missed?  No       Yen Schafer MS, OTR/L , CBIS

## 2018-10-06 NOTE — PROGRESS NOTES
Pt family continues to transfer pt in and out of bed without calling staff  Pt family and pt have been told by nurse and PT staff not to do this  They state understanding  Pt resting in bed now no complaint
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
## 2018-10-06 NOTE — PROGRESS NOTES
10/06/18 0830   Pain Assessment   Pain Assessment 0-10   Pain Score 7   Pain Location Generalized   Pain Descriptors Aching   Pain Frequency Constant/continuous   Hospital Pain Intervention(s) Other (Comment); Distraction;Relaxation technique  (medicated by RN at start of PT session)   Restrictions/Precautions   Precautions Cognitive;Bed/chair alarms; Fall Risk;Pain;Supervision on toilet/commode;Seizure;Spinal precautions   RUE Weight Bearing Per Order WBAT   LUE Weight Bearing Per Order WBAT   RLE Weight Bearing Per Order NWB   LLE Weight Bearing Per Order WBAT   Braces or Orthoses TLSO   Cognition   Overall Cognitive Status WFL   Arousal/Participation Cooperative   Attention Within functional limits   Orientation Level Oriented X4   Memory Decreased short term memory   Following Commands Follows one step commands with increased time or repetition   Subjective   Subjective pt reported 7/10 generalized pain at start of PT session to 6/10 at end of tx     QI: Roll Left and Right   Assistance Needed Supervision;Verbal cues   Roll Left and Right CARE Score 4   QI: Lying to Sitting on Side of Bed   Assistance Needed Physical assistance   Assistance Provided by [place] 25%-49%   Comment cues on technique   Lying to Sitting on Side of Bed CARE Score 3   QI: Sit to Stand   Assistance Needed Physical assistance   Assistance Provided by [place] 50%-74%   Comment mod-min with RW or // bars   Sit to Stand CARE Score 2   QI: Chair/Bed-to-Chair Transfer   Assistance Needed Physical assistance   Assistance Provided by [place] 50%-74%   Comment mod -min repeated sit pivot transfer training   Chair/Bed-to-Chair Transfer CARE Score 2   Transfer Bed/Chair/Wheelchair   Limitations Noted In LE Strength;UE Strength; Endurance;Balance   Adaptive Equipment None   Sit Pivot Moderate Assist;Minimal Assist   Stand Pivot Moderate Assist  (w/c to recliner)   Sit to Stand Moderate Assist;Contact Guard   Stand to Sit Minimal Assist   Supine to Sit Minimal Assist   Bed, Chair, Wheelchair Transfer (FIM) 2 - Gary needs to lift or boost to rise AND assist to sit   Ambulation   Primary Discharge Mode of Locomotion Wheelchair   Walk Assist Level Moderate Assist   Gait Pattern (hopping)   Assist Device Parallel Bars   Distance Walked (feet) 5 ft   Limitations Noted In Balance; Coordination; Endurance; Safety;Strength;Swing;Speed   Findings dec foot clearance and dec confidence   Walking (FIM) 1 - Patient ambulates less than 49 feet regardless of assist/device/set up   QI: Wheel 50 Feet with Two [address] / clean-up   Wheel 50 Feet with Two Turns CARE Score 5   QI: Wheel 150 Feet   Assistance Needed Set-up / clean-up   Wheel 150 Feet CARE Score 5   Wheelchair mobility   QI: Does the patient use a wheelchair? 1  Yes   QI: Indicate the type of wheelchair 1  Manual   Wheelchair Assist Level Supervision   Method Right upper extremity; Left upper extremity; Left lower extremity   Needs Assist With Remove Leg Rest;Replace Leg Rest  (cues on armrest magement )   Distance Level Surface (feet) 150 ft   Wheelchair (FIM) 5 - Patient requires supervision/monitoring AND wheels distance 150 feet or more, no rest   Other Comments   Comments static standing tolerance using RW for support x 3 mins   Assessment   Treatment Assessment PT session focused on pt and sister Jane Lopez education on safety practices to reduce risk for falls after rehab aide observed sister attempted to help transfer pt this AM from bed to w/c without socks on and w/c brake unlock  Per sister she had helped pt since yesterday to go to the bathroom as pt does not want to wait for staff assistance  PT performed hands on family training with w/c parts management and sit pivot transfers  Sister demonstrated fair carry over and agreeable to have further hands on training and verbalized understanding she is not yet allowed to transfer pt by herself but may assist when rehab aide is with pt   Pt also engaged in gait training using // bars and was able to hop x 5 ft with mod A  Also increased standing tolerance to 3 mins using a RW for support  Pt consistently compliant in maintaining NWB on R LE during transfer training  PT will continue FT and functional mobility training to reduce risk for falls and dec caregiver burden at d/c  Pt was assisted to the recliner at end of tx session with all needs within reach and alarm/pumps on  Family/Caregiver Present no   Problem List Decreased strength;Decreased endurance; Impaired balance;Decreased mobility; Decreased cognition;Orthopedic restrictions   Barriers to Discharge Inaccessible home environment;Decreased caregiver support   PT Barriers   Functional Limitation Car transfers; Ramp negotiation;Stair negotiation;Standing;Transfers; Walking; Wheelchair management   Plan   Treatment/Interventions Functional transfer training; Therapeutic exercise; Endurance training;Bed mobility;Gait training;Spoke to nursing;OT;Patient/family training   Progress Progressing toward goals   Recommendation   Recommendation 24 hour supervision/assist;Home with family support   Equipment Recommended Wheelchair   PT - OK to Discharge No   PT Therapy Minutes   PT Time In 0830   PT Time Out 0930   PT Total Time (minutes) 60   PT Mode of treatment - Individual (minutes) 60   PT Mode of treatment - Concurrent (minutes) 0   PT Mode of treatment - Group (minutes) 0   PT Mode of treatment - Co-treat (minutes) 0   PT Mode of Teatment - Total time(minutes) 60 minutes   Therapy Time missed   Time missed?  No

## 2018-10-07 LAB
GLUCOSE SERPL-MCNC: 129 MG/DL (ref 65–140)
GLUCOSE SERPL-MCNC: 164 MG/DL (ref 65–140)
GLUCOSE SERPL-MCNC: 214 MG/DL (ref 65–140)
GLUCOSE SERPL-MCNC: 283 MG/DL (ref 65–140)

## 2018-10-07 PROCEDURE — 97530 THERAPEUTIC ACTIVITIES: CPT

## 2018-10-07 PROCEDURE — 97542 WHEELCHAIR MNGMENT TRAINING: CPT

## 2018-10-07 PROCEDURE — 82948 REAGENT STRIP/BLOOD GLUCOSE: CPT

## 2018-10-07 PROCEDURE — 97110 THERAPEUTIC EXERCISES: CPT

## 2018-10-07 PROCEDURE — 97535 SELF CARE MNGMENT TRAINING: CPT

## 2018-10-07 PROCEDURE — 97127 HB COGNITIVE SKILLS DEVELOPMENT, EACH 15 MUNUTES: CPT

## 2018-10-07 PROCEDURE — G0515 COGNITIVE SKILLS DEVELOPMENT: HCPCS

## 2018-10-07 RX ORDER — GLIMEPIRIDE 2 MG/1
2 TABLET ORAL
Status: DISCONTINUED | OUTPATIENT
Start: 2018-10-07 | End: 2018-10-11

## 2018-10-07 RX ADMIN — METHOCARBAMOL 500 MG: 500 TABLET ORAL at 06:32

## 2018-10-07 RX ADMIN — LEVETIRACETAM 500 MG: 500 TABLET, FILM COATED ORAL at 08:52

## 2018-10-07 RX ADMIN — GLIMEPIRIDE 2 MG: 2 TABLET ORAL at 09:41

## 2018-10-07 RX ADMIN — METFORMIN HYDROCHLORIDE 1000 MG: 500 TABLET ORAL at 16:36

## 2018-10-07 RX ADMIN — INSULIN LISPRO 1 UNITS: 100 INJECTION, SOLUTION INTRAVENOUS; SUBCUTANEOUS at 16:39

## 2018-10-07 RX ADMIN — ENOXAPARIN SODIUM 40 MG: 40 INJECTION SUBCUTANEOUS at 08:53

## 2018-10-07 RX ADMIN — GABAPENTIN 100 MG: 100 CAPSULE ORAL at 16:36

## 2018-10-07 RX ADMIN — GABAPENTIN 100 MG: 100 CAPSULE ORAL at 08:52

## 2018-10-07 RX ADMIN — INSULIN LISPRO 2 UNITS: 100 INJECTION, SOLUTION INTRAVENOUS; SUBCUTANEOUS at 08:50

## 2018-10-07 RX ADMIN — LISINOPRIL 10 MG: 10 TABLET ORAL at 09:10

## 2018-10-07 RX ADMIN — INSULIN LISPRO 4 UNITS: 100 INJECTION, SOLUTION INTRAVENOUS; SUBCUTANEOUS at 12:37

## 2018-10-07 RX ADMIN — LEVETIRACETAM 500 MG: 500 TABLET, FILM COATED ORAL at 21:27

## 2018-10-07 RX ADMIN — LIDOCAINE 1 PATCH: 50 PATCH CUTANEOUS at 08:55

## 2018-10-07 RX ADMIN — PRAVASTATIN SODIUM 20 MG: 20 TABLET ORAL at 16:36

## 2018-10-07 RX ADMIN — METHOCARBAMOL 500 MG: 500 TABLET ORAL at 23:52

## 2018-10-07 RX ADMIN — OXYCODONE HYDROCHLORIDE 2.5 MG: 5 TABLET ORAL at 09:03

## 2018-10-07 RX ADMIN — METFORMIN HYDROCHLORIDE 1000 MG: 500 TABLET ORAL at 08:52

## 2018-10-07 RX ADMIN — METHOCARBAMOL 500 MG: 500 TABLET ORAL at 12:39

## 2018-10-07 RX ADMIN — GABAPENTIN 100 MG: 100 CAPSULE ORAL at 21:27

## 2018-10-07 RX ADMIN — METHOCARBAMOL 500 MG: 500 TABLET ORAL at 17:18

## 2018-10-07 NOTE — PROGRESS NOTES
Occupational Therapy Treatment Note     10/07/18 0710   Pain Assessment   Pain Assessment 0-10   Pain Score 5   Pain Type Acute pain   Pain Location Leg   Pain Orientation Right   Hospital Pain Intervention(s) Repositioned   Response to Interventions tolerates    Restrictions/Precautions   Precautions Cognitive; Fall Risk;Bed/chair alarms;Supervision on toilet/commode;Spinal precautions;Pain   RLE Weight Bearing Per Order NWB   Braces or Orthoses TLSO   Lifestyle   Autonomy "it's hard having to do things differently"    QI: [address] Provided by [place] No physical assistance   Eating CARE Score 6   Eating Assessment   Eating (FIM) 7 - Patient completely independent   QI: Oral Hygiene   Assistance Needed Set-up / [address] [employer] Provided by [place] No physical assistance   Comment seated in w/c at sink    Oral Hygiene CARE Score 5   Grooming   Able To Brush/Clean Teeth;Wash/Dry Face;Wash/Dry Hands;Comb/Brush Hair   Limitation Noted In (balance)   Findings seated in w/c 2* impaired dynamic standing balance with B/L UE release  Pt requires mod cues for safety of w/c locks during grooming task    Grooming (FIM) 5 - [place] sets up supplies or applies device   QI: Shower/Bathe Self   Assistance Needed Physical assistance   Assistance Provided by [place] 25%-49%   Shower/Bathe Self CARE Score 3   Bathing   Assessed Bath Style Sponge Bath   Anticipated D/C Bath Style Sponge Bath   Able to Gather/Transport No   Able to [employer] Temperature Yes   Able to Wash/Rinse/Dry (body part) Left Arm;Right Arm; Chest;Abdomen;L Lower Leg/Foot;R Upper Leg;L Upper Leg   Limitations Noted in Balance; Endurance; Safety;Strength;Timeliness   Positioning Seated;Standing   Findings  pt completes UB bathing and (L) LE bathing while seated in w/c  Pt requires assistance to bathe (R) LE 2* spinal precautions and impaired LE ROM   Pt requires contact guard assist in stance with unilateral UE release from sink to bathe buttock/perineal area  Pt uses pelvic support on sink 2* impaired static/dynamic standing balance    Bathing (FIM) 3 - Patient completes 5/10  6/10 or 7/10 parts   Tub/Shower Transfer   Not Assessed Safety; Medical;Sponge Bath   Tub Transfer (FIM) 0 - Activity does not occur   Shower Transfer (FIM) 0 - Activity does not occur   QI: Upper Body Dressing   Assistance Needed Set-up / Romulus Guarneri Provided by [place] No physical assistance   Upper Body Dressing CARE Score 5   QI: Lower Body Dressing   Assistance Needed Incidental touching   Assistance Provided by [place] Less than 25%   Lower Body Dressing CARE Score 3   QI: Putting On/Taking Off Footwear   Assistance Needed Physical assistance   Assistance Provided by [place] 50%-74%   Comment pt requires assistance to don doff (R) sock 2* ACE wrapping  Pt instructed on using sock aide and dressing stick to maintain spinal precautions, pt requires assistance to utilize  Putting On/Taking Off Footwear CARE Score 2   QI: Picking Up Object   Reason if not Attempted Safety concerns   Picking Up Object CARE Score 88   Dressing/Undressing Clothing   Remove UB Clothes Pullover Shirt   Remove LB Clothes Pants;Socks   [address] LB Clothes Pants;Socks   Limitations Noted In Balance; Endurance;Strength; Safety;Timeliness   Adaptive Equipment Reacher;Dressing Stick; Sock Aide   Positioning Supported Sit;Standing   Findings dressing completed while seated in w/c at sink  Pt stands with contact guard assist to manage clothing over hips  UB dressing completed with supervision, pt requires min cues to correctly don/doff TLSO brace  See above for desciption of footwear  Pt with good carryover of using reacher to thread (R) LE into pants s/p demonstration      UB Dressing (FIM) 5 - Patient requires supervision/monitoring   LB Dressing (FIM) 3 - Patient completes  50-74% of all tasks   QI: Roll Left and Right   Assistance Needed Supervision Assistance Provided by [place] No physical assistance   Roll Left and Right CARE Score 4   QI: Lying to Sitting on Side of Bed   Assistance Needed Supervision   Assistance Provided by [place] No physical assistance   Comment bed rail, carryover log roll technique without verbal cues    Lying to Sitting on Side of Bed CARE Score 4   QI: Sit to Stand   Assistance Needed Physical assistance   Assistance Provided by [place] 25%-49%   Comment RW   Sit to Stand CARE Score 3   QI: Chair/Bed-to-Chair Transfer   Assistance Needed Physical assistance   Assistance Provided by [place] 25%-49%   Comment rw   Chair/Bed-to-Chair Transfer CARE Score 3   Transfer Bed/Chair/Wheelchair   Limitations Noted In Balance; Endurance;UE Strength;LE Strength   Adaptive Equipment Roller Walker   Stand Pivot Minimal Assist;Assist x 1   Sit to Stand Minimal;Assist x 1   Stand to Sit Minimal;Assist x 1   Supine to Sit Supervision   Findings pt completes stand pivot transfers with min assist x1 and use of RW    Bed, Chair, Wheelchair Transfer (FIM) 4 - Patient requires steadying assist or light touching   QI: [address] Needed Physical assistance   Assistance Provided by [place] 25%-49%   [address] Score 3   Toileting   Able to [address] down yes, up yes  Manage Hygiene Bladder   Adaptive Equipment Grab Bar   Findings in stance with unilateral UE support on RW    Toileting (FIM) 4 - Patient requires steadying assist or light touching   QI: Toilet Transfer   Assistance Needed Physical assistance   Assistance Provided by [place] 25%-49%   Comment stand pivot    Toilet Transfer CARE Score 3   Toilet Transfer   Surface Assessed Drop Arm Commode   Transfer Technique Stand Pivot   Limitations Noted In Balance; Endurance;UE Strength;LE Strength   Findings min assist stand pivot transfer from w/c to [facility]    Toilet Transfer (FIM) 4 - Patient requires steadying assist or light touching   Functional Standing Tolerance Activity Pt tolerated stance x1 5 minutes for 2 trials at elevated tabletop while engaging in laundry task with B/L UE release  Pt uses pelvic support on tabletop to maintain balance, requires min verbal cues to maintain (R) LE NWB   Exercise Tools   UE Ergometer Pt engaged in UE strengthening using Ergometer to increase UB strength to increase performance during functional transfers to increase independence during ADLS  Pt completed prograde x8 minutes at level 5 0 resistance  Pt presents with good tolerance  Cognition   Memory Decreased recall of precautions   Comments pt requires repetitive training on safety of w/c and management of w/c arm rests  Pt requires overall mod cues to appropriately carryover locking w/c brakes  Activity Tolerance   Activity Tolerance Patient tolerated treatment well   Assessment   Treatment Assessment Pt participated in skilled OT tx session focused on ADL routine, UE strengthening, standing balance/tolerance  See above for further details on functional performance  Pt presents with improved functional transfers, able to complete stand pivot B/L with RW and min assist x1 while maintaining NWB (R) LE  Pt will require repetitive training on safety/management of w/c, don/doffing of TLSO brace  Additionally pt remains limited in ADL 2* impaired static/dynamic standing balance which limits pt's independence in LB ADLS and toileting  Pt reports w/c will fit in her house however there are 2 steps inside for her to get into her bathroom  Pt will require BSC for at home, OT to follow up with CM this week regarding DME recommendations  Therapy also to clarify with pt's daughter regarding amount of assistance available at home, as pt reports her family works and is unsure if she will have someone available during the day  Continue OT plan of care  Prognosis Good   Problem List Decreased strength;Decreased endurance; Impaired balance;Decreased mobility; Decreased safety awareness;Orthopedic restrictions   Plan   Treatment/Interventions ADL retraining;Functional transfer training; Therapeutic exercise; Endurance training;Patient/family training;Cognitive reorientation;Equipment eval/education; Compensatory technique education   Progress Progressing toward goals   Recommendation   OT Discharge Recommendation (pending progress )   OT Therapy Minutes   OT Time In 0710   OT Time Out 0840   OT Total Time (minutes) 90   OT Mode of treatment - Individual (minutes) 90   OT Mode of treatment - Concurrent (minutes) 0   OT Mode of treatment - Group (minutes) 0   OT Mode of treatment - Co-treat (minutes) 0   OT Mode of Teatment - Total time(minutes) 90 minutes   Therapy Time missed   Time missed?  No       Yen Schafer MS, OTR/L , CBIS
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
## 2018-10-08 LAB
ANION GAP SERPL CALCULATED.3IONS-SCNC: 5 MMOL/L (ref 4–13)
BASOPHILS # BLD AUTO: 0.04 THOUSANDS/ΜL (ref 0–0.1)
BASOPHILS NFR BLD AUTO: 1 % (ref 0–1)
BUN SERPL-MCNC: 10 MG/DL (ref 5–25)
CALCIUM SERPL-MCNC: 8.9 MG/DL (ref 8.3–10.1)
CHLORIDE SERPL-SCNC: 104 MMOL/L (ref 100–108)
CO2 SERPL-SCNC: 27 MMOL/L (ref 21–32)
CREAT SERPL-MCNC: 0.47 MG/DL (ref 0.6–1.3)
EOSINOPHIL # BLD AUTO: 0.09 THOUSAND/ΜL (ref 0–0.61)
EOSINOPHIL NFR BLD AUTO: 1 % (ref 0–6)
ERYTHROCYTE [DISTWIDTH] IN BLOOD BY AUTOMATED COUNT: 14.5 % (ref 11.6–15.1)
GFR SERPL CREATININE-BSD FRML MDRD: 110 ML/MIN/1.73SQ M
GLUCOSE SERPL-MCNC: 158 MG/DL (ref 65–140)
GLUCOSE SERPL-MCNC: 161 MG/DL (ref 65–140)
GLUCOSE SERPL-MCNC: 191 MG/DL (ref 65–140)
GLUCOSE SERPL-MCNC: 201 MG/DL (ref 65–140)
GLUCOSE SERPL-MCNC: 218 MG/DL (ref 65–140)
HCT VFR BLD AUTO: 27.4 % (ref 34.8–46.1)
HGB BLD-MCNC: 8.7 G/DL (ref 11.5–15.4)
IMM GRANULOCYTES # BLD AUTO: 0.04 THOUSAND/UL (ref 0–0.2)
IMM GRANULOCYTES NFR BLD AUTO: 1 % (ref 0–2)
LYMPHOCYTES # BLD AUTO: 2.25 THOUSANDS/ΜL (ref 0.6–4.47)
LYMPHOCYTES NFR BLD AUTO: 28 % (ref 14–44)
MCH RBC QN AUTO: 28.2 PG (ref 26.8–34.3)
MCHC RBC AUTO-ENTMCNC: 31.8 G/DL (ref 31.4–37.4)
MCV RBC AUTO: 89 FL (ref 82–98)
MONOCYTES # BLD AUTO: 0.4 THOUSAND/ΜL (ref 0.17–1.22)
MONOCYTES NFR BLD AUTO: 5 % (ref 4–12)
NEUTROPHILS # BLD AUTO: 5.29 THOUSANDS/ΜL (ref 1.85–7.62)
NEUTS SEG NFR BLD AUTO: 64 % (ref 43–75)
NRBC BLD AUTO-RTO: 0 /100 WBCS
PLATELET # BLD AUTO: 466 THOUSANDS/UL (ref 149–390)
PMV BLD AUTO: 9.4 FL (ref 8.9–12.7)
POTASSIUM SERPL-SCNC: 3.9 MMOL/L (ref 3.5–5.3)
RBC # BLD AUTO: 3.09 MILLION/UL (ref 3.81–5.12)
SODIUM SERPL-SCNC: 136 MMOL/L (ref 136–145)
WBC # BLD AUTO: 8.11 THOUSAND/UL (ref 4.31–10.16)

## 2018-10-08 PROCEDURE — G0515 COGNITIVE SKILLS DEVELOPMENT: HCPCS

## 2018-10-08 PROCEDURE — 97110 THERAPEUTIC EXERCISES: CPT

## 2018-10-08 PROCEDURE — 97127 HB COGNITIVE SKILLS DEVELOPMENT, EACH 15 MUNUTES: CPT

## 2018-10-08 PROCEDURE — 85025 COMPLETE CBC W/AUTO DIFF WBC: CPT | Performed by: PHYSICIAN ASSISTANT

## 2018-10-08 PROCEDURE — 97535 SELF CARE MNGMENT TRAINING: CPT

## 2018-10-08 PROCEDURE — 80048 BASIC METABOLIC PNL TOTAL CA: CPT | Performed by: PHYSICIAN ASSISTANT

## 2018-10-08 PROCEDURE — 97530 THERAPEUTIC ACTIVITIES: CPT

## 2018-10-08 PROCEDURE — 99232 SBSQ HOSP IP/OBS MODERATE 35: CPT | Performed by: PHYSICAL MEDICINE & REHABILITATION

## 2018-10-08 PROCEDURE — 97542 WHEELCHAIR MNGMENT TRAINING: CPT

## 2018-10-08 PROCEDURE — 82948 REAGENT STRIP/BLOOD GLUCOSE: CPT

## 2018-10-08 RX ADMIN — ACETAMINOPHEN 650 MG: 325 TABLET, FILM COATED ORAL at 08:56

## 2018-10-08 RX ADMIN — METHOCARBAMOL 500 MG: 500 TABLET ORAL at 18:58

## 2018-10-08 RX ADMIN — INSULIN LISPRO 1 UNITS: 100 INJECTION, SOLUTION INTRAVENOUS; SUBCUTANEOUS at 16:47

## 2018-10-08 RX ADMIN — LEVETIRACETAM 500 MG: 500 TABLET, FILM COATED ORAL at 08:49

## 2018-10-08 RX ADMIN — METHOCARBAMOL 500 MG: 500 TABLET ORAL at 11:53

## 2018-10-08 RX ADMIN — GABAPENTIN 100 MG: 100 CAPSULE ORAL at 16:45

## 2018-10-08 RX ADMIN — METHOCARBAMOL 500 MG: 500 TABLET ORAL at 06:17

## 2018-10-08 RX ADMIN — METFORMIN HYDROCHLORIDE 1000 MG: 500 TABLET ORAL at 16:45

## 2018-10-08 RX ADMIN — PRAVASTATIN SODIUM 20 MG: 20 TABLET ORAL at 16:45

## 2018-10-08 RX ADMIN — GLIMEPIRIDE 2 MG: 2 TABLET ORAL at 08:51

## 2018-10-08 RX ADMIN — INSULIN LISPRO 2 UNITS: 100 INJECTION, SOLUTION INTRAVENOUS; SUBCUTANEOUS at 11:53

## 2018-10-08 RX ADMIN — INSULIN LISPRO 2 UNITS: 100 INJECTION, SOLUTION INTRAVENOUS; SUBCUTANEOUS at 08:51

## 2018-10-08 RX ADMIN — METFORMIN HYDROCHLORIDE 1000 MG: 500 TABLET ORAL at 08:49

## 2018-10-08 RX ADMIN — LEVETIRACETAM 500 MG: 500 TABLET, FILM COATED ORAL at 21:18

## 2018-10-08 RX ADMIN — GABAPENTIN 100 MG: 100 CAPSULE ORAL at 08:50

## 2018-10-08 RX ADMIN — GABAPENTIN 100 MG: 100 CAPSULE ORAL at 21:18

## 2018-10-08 RX ADMIN — LIDOCAINE 1 PATCH: 50 PATCH CUTANEOUS at 08:50

## 2018-10-08 RX ADMIN — LISINOPRIL 10 MG: 10 TABLET ORAL at 08:49

## 2018-10-08 RX ADMIN — INSULIN LISPRO 1 UNITS: 100 INJECTION, SOLUTION INTRAVENOUS; SUBCUTANEOUS at 21:18

## 2018-10-08 RX ADMIN — ENOXAPARIN SODIUM 40 MG: 40 INJECTION SUBCUTANEOUS at 08:49

## 2018-10-08 NOTE — PCC SPEECH THERAPY
Pt able to complete portions of the RIPA:G-2, where overall skills were noted to be mild-moderately impaired w/ STM recall, executive function task, temporal and spatial orientation skills  At times, pt was limited in ability to complete tasks accurately due to pain and fatigue, but has improved in overall comprehension and ability to complete tasks w/ improved accuracy since pain is better controlled  Overall, pt is demonstrating improvement in safety awareness when completing problem solving picture tasks  Of note, pt will have supervision/assistance at time of d/c  At this time pt will continue to benefit from SLP services to maximize overall cognitive linguistic skills

## 2018-10-08 NOTE — PCC PHYSICAL THERAPY
Pt cont to show improvement with xfers and mobility; able to bed mobility with S/MI and xfer supine <> sit EOB with S/Earl; xfer sit pivot or stand pivot with RW to/from w/c, bed, chair and toilet; uses RW for stand pivot; difficulty with pivot if not on level floor with smooth surface; endurance is good and strength is fair/good; limited by pain; will need to schedule family training for stairs d/t pt has [place] her home; recommend cont PT POC

## 2018-10-08 NOTE — PCC CARE MANAGEMENT
Patient participating in therapy  Plans on discharging home to daughter's home with very supportive family members  Patient provided information on PA waiver program  Patient educated on need for cont'd therapy services on discharge  CM will continue to assist with dc planning

## 2018-10-08 NOTE — PROGRESS NOTES
10/08/18 1300   Pain Assessment   Pain Assessment No/denies pain   Restrictions/Precautions   Precautions Bed/chair alarms;Cognitive; Fall Risk;Supervision on toilet/commode;Spinal precautions   RLE Weight Bearing Per Order NWB   Braces or Orthoses TLSO   Memory Skills   Memory (FIM) 4 - Recalls 2 of 3 steps   Social Interaction (FIM) 5 - Interacts appropriately with others 90% of time   Speech/Language/Cognition Assessmetn   Treatment Assessment Pt engaging in completing category inclusionary task given a choice of 3 words to complete the list  Pt initially noted to choose the "category" for the words, where pt was able to correct once SLP restated directions  When completing task again, pt was then 8/12 accurate, increasing to 12/12 given review of errors  When completing reading comprehension task given a TV channel guide, pt was 8/10 accurate w/ task  Pt was oriented to place, time and situation today  Recalling visitors was supervision level  Will continue to benefit from SLP Services to maximize overall cognitive lingusitic skills at this time  SLP Therapy Minutes   SLP Time In 1300   SLP Time Out 1330   SLP Total Time (minutes) 30   SLP Mode of treatment - Individual (minutes) 30   SLP Mode of treatment - Concurrent (minutes) 0   SLP Mode of treatment - Group (minutes) 0   SLP Mode of treatment - Co-treat (minutes) 0   SLP Mode of Teatment - Total time(minutes) 30 minutes   Therapy Time missed   Time missed?  No   Daily FIM Score   Problem solving (FIM) 4 - Solves basic problems 75-89% of time   Comprehension (FIM) 4 - Understands basic info/conversation 75-90% of time   Expression (FIM) 5 - Needs help/cues only RARELY (< 10% of the time)
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
## 2018-10-08 NOTE — PROGRESS NOTES
10/08/18 1000   Pain Assessment   Pain Assessment No/denies pain   Pain Score No Pain   Restrictions/Precautions   Precautions Fall Risk;Cognitive;Bed/chair alarms;Spinal precautions   RLE Weight Bearing Per Order NWB   Braces or Orthoses TLSO   QI: Putting On/Taking Off Footwear   Assistance Needed Physical assistance   Assistance Provided by [place] 25%-49%   Comment Pt able to don/doff sock using LHAE  Pt able to don shoe but requires assist for shoe laces  Pt would benefit from elastic shoe laces   Putting On/Taking Off Footwear CARE Score 3   QI: Sit to Stand   Assistance Needed Incidental touching   Assistance Provided by [place] No physical assistance   Sit to Stand CARE Score 4   QI: Chair/Bed-to-Chair Transfer   Assistance Needed Incidental touching   Assistance Provided by [place] No physical assistance   Comment stand pivot transfer with RW  Pt requries verbal cueing to pivot L foot   Chair/Bed-to-Chair Transfer CARE Score 4   Transfer Bed/Chair/Wheelchair   Stand Pivot Contact Guard   Sit to Stand (cga)   Bed, Chair, Wheelchair Transfer (FIM) 4 - Patient requires steadying assist or light touching   QI: [address] Needed Incidental touching   Assistance Provided by [place] No physical assistance   Lorenzo Andihelangelita [address] Score 4   Toileting   Able to [address] down yes, up yes  Findings pt trialed void but unable  Pt able to pull patns up/down with CGA   Toileting (FIM) 4 - Patient requires steadying assist or light touching   QI: Toilet Transfer   Assistance Needed Incidental touching;Verbal cues   Toilet Transfer CARE Score 4   Toilet Transfer   Surface Assessed Raised Toilet   Transfer Technique Stand Pivot   Limitations Noted In Balance; Endurance; Safety   Adaptive Equipment Grab Bar   Findings Pt is CGA for stand pivot with use of grab bars   Pt able to set up w/c wtih cueing to remove leg rest     Functional Standing Tolerance   Time 2 minutes x 2 trails   Activity Pt able to stand at tabletop with unilateral hand release to complete puzzle  Pt fatigues quickly in stance and requires seated rest break  Right Upper Extremity- Strength   R Weight/Reps/Sets 2 sets 15 reps 2# dumbbell bicep curl, tricep press and 2 sets 10 reps w/c push ups   RUE Strength Comment Pt completed UE strengthen to improve sit to stand transfers and UE endurance during transfers  Left Upper Extremity-Strength   L Weights/Reps/Sets 2 sets 15 reps 2# dumbbell bicep curl, tricep press and 2 sets 10 reps w/c push ups   LUE Strength Comment Pt completed UE strengthen to improve sit to stand transfers and UE endurance during transfers  Cognition   Overall Cognitive Status WFL   Arousal/Participation Alert; Cooperative   Attention Within functional limits   Orientation Level Oriented X4   Memory Within functional limits   Following Commands Follows multistep commands without difficulty   Activity Tolerance   Activity Tolerance Patient tolerated treatment well   Assessment   Treatment Assessment Pt engaged in Ot session with focus on community reintegration to transfer to handicap bathroom  Reviewed setting up w/c for stand pivot transfers  Pt able to take off leg rests on w/c but requires assist to retrieve leg rests from floor due to back precautions  Pt educated on don/doff TLSO brace and she completed don/doff with mirror for feedback  Pt required three trials, with increased trials and verbal cueing, pt was able to complete donning brace on 3rd trial wtihout cueing  Pt overall tolerated session well with good carryover of use of LHAE  Continue witH POC with focus on balance, endurance, IADLs  Prognosis Good   Problem List Decreased strength;Decreased range of motion;Decreased endurance; Impaired balance;Decreased mobility; Decreased safety awareness   Plan   Treatment/Interventions ADL retraining;Functional transfer training; Endurance training;Patient/family training; Compensatory technique education;Gait training; Therapeutic exercise;Equipment eval/education; Bed mobility;LE strengthening/ROM   Progress Progressing toward goals   OT Therapy Minutes   OT Time In 1000   OT Time Out 1130   OT Total Time (minutes) 90   OT Mode of treatment - Individual (minutes) 90   OT Mode of treatment - Concurrent (minutes) 0   OT Mode of treatment - Group (minutes) 0   OT Mode of treatment - Co-treat (minutes) 0   OT Mode of Teatment - Total time(minutes) 90 minutes   Therapy Time missed   Time missed?  No
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
## 2018-10-08 NOTE — PROGRESS NOTES
Physical Therapy Progress Note   10/08/18 1330   Pain Assessment   Pain Assessment No/denies pain   Pain Score No Pain   Restrictions/Precautions   Precautions Bed/chair alarms;Cognitive; Fall Risk;Supervision on toilet/commode;Spinal precautions   RLE Weight Bearing Per Order NWB   Braces or Orthoses TLSO   Cognition   Overall Cognitive Status WFL   Arousal/Participation Alert; Cooperative   Attention Within functional limits   Orientation Level Oriented X4   Memory Within functional limits   Following Commands Follows multistep commands with increased time or repetition   Subjective   Subjective denies pain; no c/o   QI: Roll Left and Right   Assistance Needed Supervision   Assistance Provided by [place] No physical assistance   Roll Left and Right CARE Score 4   QI: Sit to [address] Provided by [place] No physical assistance   Sit to Lying CARE Score 4   QI: Lying to Sitting on Side of Bed   Assistance Needed Supervision   Assistance Provided by [place] No physical assistance   Lying to Sitting on Side of Bed CARE Score 4   QI: Sit to Stand   Assistance Needed Supervision   Assistance Provided by [place] No physical assistance   Comment RW   Sit to Stand CARE Score 4   Bed Mobility   Able to Roll Left to Right;Right to Left;Scoot Up   Findings S   QI: Chair/Bed-to-Chair Transfer   Assistance Needed Supervision; Incidental touching   Assistance Provided by [place] No physical assistance   Chair/Bed-to-Chair Transfer CARE Score 4   Transfer Bed/Chair/Wheelchair   Limitations Noted In LE Strength;UE Strength; Endurance;Balance;Problem Solving;Pain Management   Adaptive Equipment Walker   Sit Pivot Supervision   Stand Pivot Supervision   Sit to Stand Supervision   Stand to Sit Supervision   Supine to Sit Supervision   Sit to Supine Supervision   Car Transfer Supervision   Findings S   Bed, Chair, Wheelchair Transfer (FIM) 5 - Patient requires supervision/monitoring   QI: Car Transfer Assistance Needed Supervision; Incidental touching;Verbal cues   Assistance Provided by [place] No physical assistance   Car Transfer CARE Score 4   QI: Walk 10 Feet   Assistance Needed Supervision; Incidental touching   Assistance Provided by [place] No physical assistance   Walk 10 Feet CARE Score 4   QI: Walk 50 Feet with Two Turns   Reason if not Attempted Safety concerns   Walk 50 Feet with Two Turns CARE Score 88   QI: Walk 150 Feet   Reason if not Attempted Safety concerns   Walk 150 Feet CARE Score 88   QI: Walking 10 Feet on Uneven Surfaces   Reason if not Attempted Safety concerns   Walking 10 Feet on Uneven Surfaces CARE Score 88   Ambulation   Does the patient walk? 2  Yes   Primary Discharge Mode of Locomotion Wheelchair   Walk Assist Level Supervision;Contact Guard   Gait Pattern Hopping   Assist Device Roller Walker   Distance Walked (feet) 15 ft   Limitations Noted In Balance; Coordination; Endurance; Safety; Sequencing   Walking (FIM) 1 - Patient ambulates less than 49 feet regardless of assist/device/set up   QI: Wheel 50 Feet with [address] Provided by [place] No physical assistance   Wheel 50 Feet with Two Turns CARE Score 6   QI: [address] Provided by [place] No physical assistance   Wheel 150 Feet CARE Score 6   Wheelchair mobility   QI: Does the patient use a wheelchair? 1  Yes   QI: Indicate the type of wheelchair 1  Manual   Wheelchair Assist Level Modified Independent   Method Right upper extremity; Left upper extremity; Left lower extremity   Distance Level Surface (feet) 500 ft   Findings MI   Wheelchair (FIM) 6 - Patient wheels 150 feet or more, no rests AND independently, timely and safely   QI: 1 Step (Curb)   Reason if not Attempted Safety concerns   1 Step (Curb) CARE Score 88   QI: 4 Steps   Reason if not Attempted Activity not applicable   4 Steps CARE Score 9   QI: 12 Steps   Reason if not Attempted Activity not applicable   12 Steps CARE Score 9   Stairs   Findings NT   QI: Picking Up Object   Reason if not Attempted Safety concerns   Picking Up Object CARE Score 88   QI: Toilet Transfer   Assistance Needed Incidental touching;Verbal cues   Assistance Provided by [place] Less than 25%   Toilet Transfer CARE Score 3   Toilet Transfer   Findings Ax2   Toilet Transfer (FIM) 4 - Patient completes 75% of all tasks   Therapeutic Interventions   Strengthening seated TE (marches, LAQ, hip abd/add, HS curls, ankle pumps, wheelchair push-ups)   Equipment Use   NuStep 15 minutes, level 2   Assessment   Treatment Assessment Pt in w/c prior to session; assessed xfers to/from bed as S with use of bed rail for sit pivot and RW for SPT; roll L/R and bridge/scoot with S; able to maintain NWB status without much cueing; w/c propel MI with B UE, 400'; instructed in seated TE (as above) and NuStep x15 minutes, level 2; pt car xfer with verbal cueing and S using sit pivot to L/R; xfer into bed at end of session; alarms active and all needs in reach; recommend cont PT POC; Family/Caregiver Present no   Problem List Decreased strength;Decreased mobility;Orthopedic restrictions   Barriers to Discharge Inaccessible home environment   PT Barriers   Functional Limitation Stair negotiation;Ramp negotiation; Walking   Plan   Treatment/Interventions ADL retraining;Functional transfer training;LE strengthening/ROM; Elevations; Therapeutic exercise; Endurance training;Cognitive reorientation;Patient/family training;Equipment eval/education; Bed mobility;Gait training   Progress Progressing toward goals   Recommendation   Recommendation Home PT; Home with family support   Equipment Recommended Wheelchair;Walker   PT Therapy Minutes   PT Time In 1330   PT Time Out 1500   PT Total Time (minutes) 90   PT Mode of treatment - Individual (minutes) 90   PT Mode of treatment - Concurrent (minutes) 0   PT Mode of treatment - Group (minutes) 0 PT Mode of treatment - Co-treat (minutes) 0   PT Mode of Teatment - Total time(minutes) 90 minutes   Therapy Time missed   Time missed?  No     Phyllis Rivera, PTA

## 2018-10-09 LAB
GLUCOSE SERPL-MCNC: 153 MG/DL (ref 65–140)
GLUCOSE SERPL-MCNC: 167 MG/DL (ref 65–140)
GLUCOSE SERPL-MCNC: 174 MG/DL (ref 65–140)
GLUCOSE SERPL-MCNC: 186 MG/DL (ref 65–140)

## 2018-10-09 PROCEDURE — 97127 HB COGNITIVE SKILLS DEVELOPMENT, EACH 15 MUNUTES: CPT

## 2018-10-09 PROCEDURE — 99233 SBSQ HOSP IP/OBS HIGH 50: CPT | Performed by: PHYSICAL MEDICINE & REHABILITATION

## 2018-10-09 PROCEDURE — 97542 WHEELCHAIR MNGMENT TRAINING: CPT

## 2018-10-09 PROCEDURE — G0515 COGNITIVE SKILLS DEVELOPMENT: HCPCS

## 2018-10-09 PROCEDURE — 82948 REAGENT STRIP/BLOOD GLUCOSE: CPT

## 2018-10-09 PROCEDURE — 97530 THERAPEUTIC ACTIVITIES: CPT

## 2018-10-09 PROCEDURE — 97110 THERAPEUTIC EXERCISES: CPT

## 2018-10-09 PROCEDURE — 97535 SELF CARE MNGMENT TRAINING: CPT

## 2018-10-09 RX ORDER — LEVETIRACETAM 500 MG/1
500 TABLET ORAL EVERY 12 HOURS SCHEDULED
Status: COMPLETED | OUTPATIENT
Start: 2018-10-09 | End: 2018-10-09

## 2018-10-09 RX ADMIN — GABAPENTIN 100 MG: 100 CAPSULE ORAL at 22:27

## 2018-10-09 RX ADMIN — PRAVASTATIN SODIUM 20 MG: 20 TABLET ORAL at 16:47

## 2018-10-09 RX ADMIN — METHOCARBAMOL 500 MG: 500 TABLET ORAL at 17:53

## 2018-10-09 RX ADMIN — LEVETIRACETAM 500 MG: 500 TABLET, FILM COATED ORAL at 22:27

## 2018-10-09 RX ADMIN — GABAPENTIN 100 MG: 100 CAPSULE ORAL at 16:47

## 2018-10-09 RX ADMIN — METHOCARBAMOL 500 MG: 500 TABLET ORAL at 23:54

## 2018-10-09 RX ADMIN — METFORMIN HYDROCHLORIDE 1000 MG: 500 TABLET ORAL at 08:03

## 2018-10-09 RX ADMIN — OXYCODONE HYDROCHLORIDE 5 MG: 5 TABLET ORAL at 11:40

## 2018-10-09 RX ADMIN — INSULIN LISPRO 1 UNITS: 100 INJECTION, SOLUTION INTRAVENOUS; SUBCUTANEOUS at 08:01

## 2018-10-09 RX ADMIN — INSULIN LISPRO 1 UNITS: 100 INJECTION, SOLUTION INTRAVENOUS; SUBCUTANEOUS at 11:41

## 2018-10-09 RX ADMIN — METHOCARBAMOL 500 MG: 500 TABLET ORAL at 06:24

## 2018-10-09 RX ADMIN — ENOXAPARIN SODIUM 40 MG: 40 INJECTION SUBCUTANEOUS at 08:04

## 2018-10-09 RX ADMIN — GABAPENTIN 100 MG: 100 CAPSULE ORAL at 08:04

## 2018-10-09 RX ADMIN — METHOCARBAMOL 500 MG: 500 TABLET ORAL at 11:40

## 2018-10-09 RX ADMIN — LISINOPRIL 10 MG: 10 TABLET ORAL at 08:04

## 2018-10-09 RX ADMIN — INSULIN LISPRO 1 UNITS: 100 INJECTION, SOLUTION INTRAVENOUS; SUBCUTANEOUS at 16:48

## 2018-10-09 RX ADMIN — METHOCARBAMOL 500 MG: 500 TABLET ORAL at 00:50

## 2018-10-09 RX ADMIN — INSULIN LISPRO 1 UNITS: 100 INJECTION, SOLUTION INTRAVENOUS; SUBCUTANEOUS at 22:34

## 2018-10-09 RX ADMIN — LEVETIRACETAM 500 MG: 500 TABLET, FILM COATED ORAL at 08:04

## 2018-10-09 RX ADMIN — GLIMEPIRIDE 2 MG: 2 TABLET ORAL at 08:04

## 2018-10-09 RX ADMIN — LIDOCAINE 1 PATCH: 50 PATCH CUTANEOUS at 08:04

## 2018-10-09 RX ADMIN — METFORMIN HYDROCHLORIDE 1000 MG: 500 TABLET ORAL at 16:47

## 2018-10-09 NOTE — PROGRESS NOTES
10/09/18 0700   Pain Assessment   Pain Assessment No/denies pain   Pain Score No Pain   Restrictions/Precautions   Precautions Bed/chair alarms; Fall Risk   Braces or Orthoses TLSO   QI: Eating   Assistance Needed Independent   Assistance Provided by [place] No physical assistance   Eating CARE Score 6   Eating Assessment   Meal Assessed Breakfast   QI: Swallowing/Nutritional Status Regular food   Intake Mode PO   Opens Packages Yes   Eating (FIM) 7 - Patient completely independent   QI: Oral Hygiene   Assistance Needed Supervision   Assistance Provided by [place] No physical assistance   Comment standing at sink with CS for safety with balance while ins tance  Oral Hygiene CARE Score 4   Grooming   Able To Wash/Dry Face;Comb/Brush Hair;Brush/Clean Teeth;Wash/Dry Hands   Limitation Noted In Strength   Findings standing at sink   Grooming (FIM) 5 - Patient requires supervision/monitoring   QI: Shower/Bathe Self   Assistance Needed Supervision;Verbal cues   Assistance Provided by [place] No physical assistance   Comment Pt requires verbal cueing for sequencing wearing brace while in stance for jia and buttock hygiene during bathing   Shower/Bathe Self CARE Score 4   Bathing   Assessed Bath Style Sponge Bath   Anticipated D/C Bath Style Sponge Bath   Able to Gather/Transport No   Able to [employer] Temperature Yes   Able to Wash/Rinse/Dry (body part) Left Arm;Right Arm;L Upper Leg;R Upper Leg;L Lower Leg/Foot;R Lower Leg/Foot;Chest;Abdomen;Perineal Area; Buttocks   Limitations Noted in Balance; Endurance; Safety   Positioning Seated;Standing   Bathing (FIM) 5 - Patient requires supervision/monitoring but completes 10/10 parts   Tub/Shower Transfer   Not Assessed Safety; Medical   QI: Upper Body Dressing   Assistance Needed Supervision;Verbal cues   Assistance Provided by [place] No physical assistance   Comment Pt requires verbal cueing for donning TLSO brace and and to bring straps under armpit   Pt with decreased carryover despite multiple educations   Upper Body Dressing CARE Score 4   QI: Lower Body Dressing   Assistance Needed Incidental touching   Assistance Provided by [place] No physical assistance   Comment Pt is CGA for balance support with unilateral hand release for pants up/down  pT ABLE TO CROSS leg over opposing knee to don pants and underwear over feet  Pt requires cueing 10% of time to ensure carryover of back precautions   Lower Body Dressing CARE Score 4   QI: Putting On/Taking Off Footwear   Assistance Needed Supervision;Verbal cues   Assistance Provided by [place] No physical assistance   Comment Pt able to cross leg over knee to doff and don sock  Pt reuqires increased time to complete task  Putting On/Taking Off Footwear CARE Score 4   QI: Picking Up Object   Reason if not Attempted Safety concerns   Picking Up Object CARE Score 88   Dressing/Undressing Clothing   Remove UB Clothes Pullover Shirt   Remove LB Clothes Pants; Undergarment;Socks   Don  [facility]; Undergarment;Socks   Limitations Noted In Balance; Endurance;Problem Solving; Safety   Positioning Supported Sit;Standing   UB Dressing (FIM) 5 - Patient requires verbal cues   LB Dressing (FIM) 4 - Patient requires steadying assist or light touching   QI: Sit to Stand   Assistance Needed Verbal cues; Supervision   Assistance Provided by [place] No physical assistance   Sit to Stand CARE Score 4   QI: Chair/Bed-to-Chair Transfer   Assistance Needed Incidental touching   Assistance Provided by [place] No physical assistance   Chair/Bed-to-Chair Transfer CARE Score 4   Transfer Bed/Chair/Wheelchair   Limitations Noted In Balance; Endurance; Sequencing;Problem Solving  (safety)   Stand Pivot Contact Guard   Sit to Stand Supervision   Stand to Sit Supervision   Bed, Chair, Wheelchair Transfer (FIM) 4 - Patient requires steadying assist or light touching   Toileting   Toileting (FIM) 4 - Patient requires steadying assist or light touching   Toilet Transfer   Toilet Transfer (FIM) 4 - Patient requires steadying assist or light touching   Cognition   Overall Cognitive Status WFL   Arousal/Participation Alert; Cooperative   Attention Within functional limits   Orientation Level Oriented X4   Memory Within functional limits   Following Commands Follows multistep commands with increased time or repetition   Activity Tolerance   Activity Tolerance Patient tolerated treatment well   Assessment   Treatment Assessment Pt engaged in skilled OT treatment session wtih focus on balance, endurance, adn ADL routine  Pt able to complete UB/LB dressing without use of LHAE by crossing legs over oppsoing knee  Pt continues to require education to maintain back precautions  Pt continue sto require CGA while in stance to pull pants and underwear up over hips  Pt able to maintain NWB of RLE during stance  Prognosis Good   Problem List Decreased strength;Decreased endurance; Impaired balance;Decreased mobility; Decreased coordination   Plan   Treatment/Interventions ADL retraining;Functional transfer training;LE strengthening/ROM; Therapeutic exercise; Endurance training;Patient/family training; Compensatory technique education   Progress Progressing toward goals   Recommendation   OT Discharge Recommendation Home with family support   OT Therapy Minutes   OT Time In 0700   OT Time Out 0800   OT Total Time (minutes) 60   OT Mode of treatment - Individual (minutes) 60   OT Mode of treatment - Concurrent (minutes) 0   OT Mode of treatment - Group (minutes) 0   OT Mode of treatment - Co-treat (minutes) 0   OT Mode of Teatment - Total time(minutes) 60 minutes   Therapy Time missed   Time missed?  No
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
## 2018-10-09 NOTE — SOCIAL WORK
CM met with patient and daughter to review weekly team meeting and discuss current functional barriers  Patient/daughter agreeable to dc date 10/15 with jo[facility] RN/PT/OT  [facility] referral to [facility] placed for the same  PA waiver program information given to patient's daughter, until to find previous on

## 2018-10-09 NOTE — PROGRESS NOTES
10/09/18 0858   Pain Assessment   Pain Assessment 0-10   Pain Score Worst Possible Pain   Pain Type Acute pain   Pain Location Leg   Pain Orientation Right   Hospital Pain Intervention(s) Medication (See MAR); Repositioned;Distraction  (pt reporting medications just given for leg pain)   Restrictions/Precautions   Precautions Bed/chair alarms;Cognitive; Fall Risk;Spinal precautions;Supervision on toilet/commode   RLE Weight Bearing Per Order NWB   Braces or Orthoses TLSO   Memory Skills   Memory (FIM) 4 - Recognizes/recalls/performs 75-89%   Social Interaction (FIM) 6 - Interacts appropriately with others BUT requires extra  time   Speech/Language/Cognition Assessmetn   Treatment Assessment Pt beginning session by completing transfer from [facility] to [facility], where overall ability to follow commands was supervision level  STM recall of daily events, such as last meal, visitors, prior therapies, pt was supervision levle in recalling  As for d/w pt about overall progress and the need for continued therapy at this time, pt did state that she felt that ~ 1 more week was needed to maximize overall function and cognitive skills  Engaged pt in category inclusion task for concrete and abstract words where pt was 53/54 accurate  When completing similar task given 6  animal categories, where pt was 28/30 accurate, increasing to 30/30 given verbal cues to correct  Engaged in review of medications, where pt was unable to verbalize or recall any medications which pt was taking prior to admission, except for metformin  Pt reviewed all current medications, but remained mod A to recall what the medications would aid  Introduced pill box for pt to complete given just AM and PM for the week  Pt was able to complete simple medication labels (ie: take 1 pill in the morning, take 1 pill at bedtime) was [facility]   When given a pill label which stated take 1 pill every 12 hrs, where pt was able to problem solve that she was to take 1 pill in the AM and then 1 pill in the PM  Pt also able to problem solve a medicine label stating take 1 pill 3x/day, where pt was able to verbalize how to determine how to appropriately take medication throughout the day (one pill at every meal)  Will continue to benefit from SLP services to maximize overall cognitive linguistic skills at this time  SLP Therapy Minutes   SLP Time In 0830   SLP Time Out 0930   SLP Total Time (minutes) 60   SLP Mode of treatment - Individual (minutes) 60   SLP Mode of treatment - Concurrent (minutes) 0   SLP Mode of treatment - Group (minutes) 0   SLP Mode of treatment - Co-treat (minutes) 0   SLP Mode of Teatment - Total time(minutes) 60 minutes   Therapy Time missed   Time missed?  No   Daily FIM Score   Problem solving (FIM) 4 - Solves basic problems 75-89% of time   Comprehension (FIM) 5 - Understands basic directions and conversation   Expression (FIM) 5 - Needs help/cues only RARELY (< 10% of the time)

## 2018-10-09 NOTE — PROGRESS NOTES
Physical Therapy Progress Note   10/09/18 1000   Pain Assessment   Pain Assessment 0-10   Pain Score 4   Pain Type Acute pain   Pain Location Leg   Pain Orientation Right   Restrictions/Precautions   Precautions Bed/chair alarms;Cognitive; Fall Risk;Spinal precautions;Supervision on toilet/commode   RLE Weight Bearing Per Order NWB   Braces or Orthoses TLSO   Cognition   Overall Cognitive Status WFL   Arousal/Participation Alert; Cooperative   Attention Within functional limits   Orientation Level Oriented X4   Memory Within functional limits   Following Commands Follows multistep commands with increased time or repetition   Subjective   Subjective reports 4/10 pain in leg; no other c/o   QI: Roll Left and Right   Assistance Needed Supervision   Assistance Provided by [place] No physical assistance   Roll Left and Right CARE Score 4   QI: Sit to [address] Provided by [place] No physical assistance   Sit to Lying CARE Score 4   QI: Lying to Sitting on Side of Bed   Assistance Needed Supervision   Assistance Provided by [place] No physical assistance   Lying to Sitting on Side of Bed CARE Score 4   QI: Sit to [address] Provided by [place] No physical assistance   Sit to Stand CARE Score 4   Bed Mobility   Able to Roll Left to Right;Right to Left;Scoot Up   Findings S   QI: Chair/Bed-to-Chair Transfer   Assistance Needed Supervision   Assistance Provided by [place] No physical assistance   Chair/Bed-to-Chair Transfer CARE Score 4   Transfer Bed/Chair/Wheelchair   Limitations Noted In LE Strength;Balance   Adaptive Equipment Walker   Sit Pivot Supervision   Stand Pivot Supervision   Sit to Stand Supervision   Stand to Sit Supervision   Supine to Sit Supervision   Sit to Supine Supervision   Car Transfer Supervision   Findings S   Bed, Chair, Wheelchair Transfer (FIM) 5 - Patient requires supervision/monitoring   QI: Car Transfer   Assistance Needed Supervision   Assistance Provided by [place] No physical assistance   Car Transfer CARE Score 4   QI: [address] Provided by [place] No physical assistance   Walk 10 Feet CARE Score 4   QI: Walk 50 Feet with Two Turns   Reason if not Attempted Safety concerns   Walk 50 Feet with Two Turns CARE Score 88   QI: Walk 150 Feet   Reason if not Attempted Safety concerns   Walk 150 Feet CARE Score 88   QI: Walking 10 Feet on Uneven Surfaces   Reason if not Attempted Safety concerns   Walking 10 Feet on Uneven Surfaces CARE Score 88   Ambulation   Does the patient walk? 2  Yes   Primary Discharge Mode of Locomotion Wheelchair   Findings NA,   Walking (FIM) 0 - Activity does not occur   QI: Wheel 50 Feet with [address] Provided by [place] No physical assistance   Wheel 50 Feet with Two Turns CARE Score 6   QI: Wheel 150 [address] Provided by [place] No physical assistance   Wheel 150 Feet CARE Score 6   Wheelchair mobility   QI: Does the patient use a wheelchair? 1  Yes   QI: Indicate the type of wheelchair 1  Manual   Wheelchair Assist Level Modified Independent   Method Right upper extremity; Left upper extremity; Left lower extremity   Needs Assist With Remove Leg Rest;Replace Leg Rest   Distance Level Surface (feet) 400 ft   Findings MI   Wheelchair (FIM) 6 - Patient wheels 150 feet or more, no rests AND independently, timely and safely   QI: 1 Step (Curb)   Reason if not Attempted Safety concerns   1 Step (Curb) CARE Score 88   QI: 4 Steps   Reason if not Attempted Activity not applicable   4 Steps CARE Score 9   QI: 12 Steps   Reason if not Attempted Activity not applicable   12 Steps CARE Score 9   Stairs   Findings NT   QI: Picking Up Object   Reason if not Attempted Safety concerns   Picking Up Object CARE Score 88   QI: Toilet Transfer   Assistance Needed Supervision; Incidental touching   Assistance Provided by [place] No physical assistance   Toilet Transfer CARE Score 4   Toilet Transfer   Surface Assessed Raised Toilet   Toilet Transfer (FIM) 5 - Patient requires supervision/monitoring   Equipment Use   NuStep 15 minutes, level 3   Assessment   Treatment Assessment 30-minute session today consisted of w/c propel with B UE to dec assist level with w/c propel and inc endurance/strength B UE; NuStep x15 minutes, level for B UE and B LE strength/endurance; recommend cont PT POC; Family/Caregiver Present no   Problem List Decreased strength;Decreased mobility;Orthopedic restrictions   Barriers to Discharge Inaccessible home environment   PT Barriers   Functional Limitation Stair negotiation;Ramp negotiation; Walking   Plan   Treatment/Interventions ADL retraining;Functional transfer training;LE strengthening/ROM; Elevations; Therapeutic exercise; Endurance training;Cognitive reorientation;Patient/family training;Equipment eval/education; Bed mobility;Gait training   Progress Progressing toward goals   Recommendation   Recommendation Home PT; Home with family support   Equipment Recommended Wheelchair;Walker   PT Therapy Minutes   PT Time In 1000   PT Time Out 1030   PT Total Time (minutes) 30   PT Mode of treatment - Individual (minutes) 0   PT Mode of treatment - Concurrent (minutes) 30   PT Mode of treatment - Group (minutes) 0   PT Mode of treatment - Co-treat (minutes) 0   PT Mode of Teatment - Total time(minutes) 30 minutes   Therapy Time missed   Time missed?  No     Manuel Arts, PTA
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
## 2018-10-09 NOTE — PROGRESS NOTES
10/09/18 1100   Pain Assessment   Pain Assessment 0-10   Pain Score 3   Pain Type Acute pain   Pain Location Leg   Pain Orientation Right   Hospital Pain Intervention(s) Cold applied   Response to Interventions pt tolerated ice pack to leg for 20 minutes   Restrictions/Precautions   Precautions Bed/chair alarms; Fall Risk   RLE Weight Bearing Per Order NWB   QI: Sit to Stand   Assistance Needed Supervision   Assistance Provided by [place] No physical assistance   Sit to Stand CARE Score 4   QI: Chair/Bed-to-Chair Transfer   Assistance Needed Incidental touching;Verbal cues   Assistance Provided by [place] No physical assistance   Chair/Bed-to-Chair Transfer CARE Score 4   Transfer Bed/Chair/Wheelchair   Stand Pivot Contact Guard   Sit to Stand (cga)   Stand to Sit (cga)   Findings Pt with poor positioning of w/c to recliner and attempting to park w/c parallel to recliner and stand to pivot  Pt required therapist to stop her transfer and educated pt on completing stand pivot using 90 degree angle  Pt  dtr was present and educated  Pt sasha to doff w/c leg rest but requires assist to don leg rest due to poor problem solving  Bed, Chair, Wheelchair Transfer (FIM) 4 - Patient requires steadying assist or light touching   QI: [address] Needed Incidental touching   Assistance Provided by [place] No physical assistance   Comment Pt sasha to complete pants up/down with CGA for balance support while standing wtih unilateral UE support on grab bar   [address] Score 4   Toileting   Able to [address] down yes, up yes     Able to Manage Clothing Closures Yes   Adaptive Equipment Grab Bar   Toileting (FIM) 4 - Patient requires steadying assist or light touching   QI: Toilet Transfer   Assistance Needed Incidental touching;Verbal cues   Assistance Provided by [place] No physical assistance   Comment Pt completed stand pivot transfer from w/c to commode using grab bar against wall   Toilet Transfer CARE Score 4   Toilet Transfer   Surface Assessed Standard Commode   Transfer Technique Stand Pivot   Toilet Transfer (FIM) 4 - Patient requires steadying assist or light touching   Cognition   Overall Cognitive Status Impaired   Arousal/Participation Alert; Cooperative   Attention Within functional limits   Orientation Level Oriented X4   Memory Decreased recall of precautions;Decreased short term memory   Following Commands Follows multistep commands with increased time or repetition   Activity Tolerance   Activity Tolerance Patient tolerated treatment well   Assessment   Treatment Assessment Pt dtr presents for 30 minute OT session with focus on family education for TLSO management and toileting  Dtr stated that pt bathroom in basement is small and w/c will fit in doorway btu then pt will have to stand and turn to get to toilet  Setup in bathroom is that sink is across for door and toilet is next to sink  Pt has wall on other side of toilet where it was recommended to install grab bar  Pt sasha to complete transfer at [facility] level  Dtr states that family can install grab bar prior to pt coming home  Dtr educated on donning TLSO brace and precautions for don/doff in shower once pt is cleared to shower  Dtr undrestands  Dtr also educated on pt's need for verbal cueing to recall back precautions during fxnl tasks and need for verbal cueing for placement of straps on TLSO brace  Pt dtr will be abck for additonal training on Saturday at [facility] training done with: dtr   Assessment of family training dtr will be present for additional family training  Dtr verbalized understanding of brace management and back precautions for patietn today  Dtr also educated on purchasing shower chair with back for pt upon d/c  Prognosis Good   Problem List Decreased strength;Decreased endurance; Impaired balance;Decreased mobility; Decreased safety awareness;Pain;Orthopedic restrictions   Plan   Treatment/Interventions ADL retraining;Functional transfer training;LE strengthening/ROM; Therapeutic exercise; Endurance training;Equipment eval/education; Compensatory technique education   Progress Progressing toward goals   Recommendation   OT Discharge Recommendation Home with family support   Equipment Recommended (grab bar by toilet, shower seat with back)   OT Therapy Minutes   OT Time In 1100   OT Time Out 1130   OT Total Time (minutes) 30   OT Mode of treatment - Individual (minutes) 30   OT Mode of treatment - Concurrent (minutes) 0   OT Mode of treatment - Group (minutes) 0   OT Mode of treatment - Co-treat (minutes) 0   OT Mode of Teatment - Total time(minutes) 30 minutes   Therapy Time missed   Time missed?  No

## 2018-10-10 LAB
GLUCOSE SERPL-MCNC: 107 MG/DL (ref 65–140)
GLUCOSE SERPL-MCNC: 157 MG/DL (ref 65–140)
GLUCOSE SERPL-MCNC: 189 MG/DL (ref 65–140)
GLUCOSE SERPL-MCNC: 256 MG/DL (ref 65–140)

## 2018-10-10 PROCEDURE — 99232 SBSQ HOSP IP/OBS MODERATE 35: CPT | Performed by: PHYSICAL MEDICINE & REHABILITATION

## 2018-10-10 PROCEDURE — 97542 WHEELCHAIR MNGMENT TRAINING: CPT

## 2018-10-10 PROCEDURE — 97530 THERAPEUTIC ACTIVITIES: CPT

## 2018-10-10 PROCEDURE — 97127 HB COGNITIVE SKILLS DEVELOPMENT, EACH 15 MUNUTES: CPT

## 2018-10-10 PROCEDURE — 97116 GAIT TRAINING THERAPY: CPT

## 2018-10-10 PROCEDURE — 97110 THERAPEUTIC EXERCISES: CPT

## 2018-10-10 PROCEDURE — G0515 COGNITIVE SKILLS DEVELOPMENT: HCPCS

## 2018-10-10 PROCEDURE — 97535 SELF CARE MNGMENT TRAINING: CPT

## 2018-10-10 PROCEDURE — 82948 REAGENT STRIP/BLOOD GLUCOSE: CPT

## 2018-10-10 RX ADMIN — METHOCARBAMOL 500 MG: 500 TABLET ORAL at 11:53

## 2018-10-10 RX ADMIN — GABAPENTIN 100 MG: 100 CAPSULE ORAL at 22:13

## 2018-10-10 RX ADMIN — METHOCARBAMOL 500 MG: 500 TABLET ORAL at 18:16

## 2018-10-10 RX ADMIN — GABAPENTIN 100 MG: 100 CAPSULE ORAL at 16:58

## 2018-10-10 RX ADMIN — OXYCODONE HYDROCHLORIDE 5 MG: 5 TABLET ORAL at 08:58

## 2018-10-10 RX ADMIN — METFORMIN HYDROCHLORIDE 1000 MG: 500 TABLET ORAL at 08:58

## 2018-10-10 RX ADMIN — GLIMEPIRIDE 2 MG: 2 TABLET ORAL at 09:00

## 2018-10-10 RX ADMIN — INSULIN LISPRO 3 UNITS: 100 INJECTION, SOLUTION INTRAVENOUS; SUBCUTANEOUS at 11:52

## 2018-10-10 RX ADMIN — INSULIN LISPRO 1 UNITS: 100 INJECTION, SOLUTION INTRAVENOUS; SUBCUTANEOUS at 22:14

## 2018-10-10 RX ADMIN — GABAPENTIN 100 MG: 100 CAPSULE ORAL at 08:58

## 2018-10-10 RX ADMIN — LIDOCAINE 1 PATCH: 50 PATCH CUTANEOUS at 08:59

## 2018-10-10 RX ADMIN — LISINOPRIL 10 MG: 10 TABLET ORAL at 08:59

## 2018-10-10 RX ADMIN — ENOXAPARIN SODIUM 40 MG: 40 INJECTION SUBCUTANEOUS at 08:59

## 2018-10-10 RX ADMIN — PRAVASTATIN SODIUM 20 MG: 20 TABLET ORAL at 16:58

## 2018-10-10 RX ADMIN — METFORMIN HYDROCHLORIDE 1000 MG: 500 TABLET ORAL at 16:58

## 2018-10-10 RX ADMIN — INSULIN LISPRO 1 UNITS: 100 INJECTION, SOLUTION INTRAVENOUS; SUBCUTANEOUS at 09:01

## 2018-10-10 RX ADMIN — METHOCARBAMOL 500 MG: 500 TABLET ORAL at 05:10

## 2018-10-10 RX ADMIN — METHOCARBAMOL 500 MG: 500 TABLET ORAL at 23:57
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
## 2018-10-10 NOTE — PROGRESS NOTES
Physical Therapy Progress Note   10/10/18 0900   Pain Assessment   Pain Assessment 0-10   Pain Score 6   Pain Location Back;Leg   Pain Orientation Bilateral;Right   Restrictions/Precautions   Precautions Bed/chair alarms; Fall Risk   RUE Weight Bearing Per Order WBAT   LUE Weight Bearing Per Order WBAT   RLE Weight Bearing Per Order NWB   LLE Weight Bearing Per Order WBAT   Braces or Orthoses TLSO   Cognition   Overall Cognitive Status WFL   Arousal/Participation Alert; Cooperative   Attention Within functional limits   Orientation Level Oriented X4   Memory Within functional limits   Following Commands Follows multistep commands with increased time or repetition   Subjective   Subjective reports 6/10 pain back and R LE; no other c/o   QI: Roll Left and Right   Assistance Needed Supervision   Assistance Provided by [place] No physical assistance   Roll Left and Right CARE Score 4   QI: Sit to [address] Provided by [place] No physical assistance   Sit to Lying CARE Score 4   QI: Lying to Sitting on Side of Bed   Assistance Needed Supervision   Assistance Provided by [place] No physical assistance   Lying to Sitting on Side of Bed CARE Score 4   QI: Sit to [address] Provided by [place] No physical assistance   Sit to Stand CARE Score 4   Bed Mobility   Able to Roll Left to Right;Right to Left;Scoot Up   Findings S   QI: Chair/Bed-to-Chair Transfer   Assistance Needed Supervision   Assistance Provided by [place] No physical assistance   Chair/Bed-to-Chair Transfer CARE Score 4   Transfer Bed/Chair/Wheelchair   Limitations Noted In LE Strength;UE Strength;Problem Solving   Adaptive Equipment Walker   Sit Pivot Supervision   Stand Pivot Supervision   Sit to Stand Supervision   Stand to Sit Supervision   Supine to Sit Supervision   Sit to Supine Supervision   Car Transfer Supervision   All Transfer Supervision   Findings S   Bed, Chair, Wheelchair Transfer (FIM) 5 - Patient requires supervision/monitoring   QI: [address] Provided by [place] No physical assistance   Car Transfer CARE Score 4   QI: Walk 10 [address] Provided by [place] No physical assistance   Comment 5-10' S with RW   Walk 10 Feet CARE Score 4   QI: Walk 50 Feet with Two Turns   Reason if not Attempted Safety concerns   Walk 50 Feet with Two Turns CARE Score 88   QI: Walk 150 Feet   Reason if not Attempted Safety concerns   Walk 150 Feet CARE Score 88   QI: Walking 10 Feet on Uneven Surfaces   Reason if not Attempted Safety concerns   Walking 10 Feet on Uneven Surfaces CARE Score 88   Ambulation   Does the patient walk? 2  Yes   Primary Discharge Mode of Locomotion Wheelchair   Walk Assist Level Supervision   Gait Pattern Hopping   Assist Device Roller Walker   Distance Walked (feet) 10 ft   Limitations Noted In Balance; Coordination   Walking (FIM) 1 - Patient ambulates less than 49 feet regardless of assist/device/set up   QI: Wheel 50 Feet with [address] Provided by [place] No physical assistance   Wheel 50 Feet with Two Turns CARE Score 6   QI: Wheel 150 [address] Provided by [place] No physical assistance   Wheel 150 Feet CARE Score 6   Wheelchair mobility   QI: Does the patient use a wheelchair? 1  Yes   QI: Indicate the type of wheelchair 1  Manual   Wheelchair Assist Level Modified Independent   Method Right upper extremity; Left upper extremity; Left lower extremity   Needs Assist With Remove Leg Rest;Replace Leg Rest   Distance Level Surface (feet) 400 ft   Findings MI   Wheelchair (FIM) 6 - Patient wheels 150 feet or more, no rests AND independently, timely and safely   QI: 1 Step (Curb)   Reason if not Attempted Safety concerns   1 Step (Curb) CARE Score 88   QI: 4 Steps   Reason if not Attempted Activity not applicable   4 Steps CARE Score 9   QI: 12 Steps   Reason if not Attempted Activity not applicable   12 Steps CARE Score 9   Stairs   Findings NT   QI: Picking Up Object   Reason if not Attempted Safety concerns   Picking Up Object CARE Score 88   QI: Toilet Transfer   Assistance Needed Supervision   Assistance Provided by [place] No physical assistance   Toilet Transfer CARE Score 4   Toilet Transfer   Surface Assessed Raised Toilet   Toilet Transfer (FIM) 5 - Patient requires supervision/monitoring   Therapeutic Interventions   Strengthening seated TE (marches, LAQ, hip abd/add, ankle pumps, HS curls)   Flexibility manual stretch B HS and gastroc, gentle on R LE   Equipment Use   NuStep 15 minutes, level 3   Assessment   Treatment Assessment Pt cont to demonstrate S for xfers sit pivot to/from L/R side and to/from all surfaces; MI for bed mobility and MI for w/c propel ad julio cesar; family training set for saturday, d/c tentative for monday; isntructed in seated TE for B LE strength/ROM; NuStep for B LE strength/ROM/endurance; finished session seated in bedside recliner, alarms active and all needs in reach; recommend cont PT POC   Family/Caregiver Present no   Problem List Decreased strength;Decreased mobility;Orthopedic restrictions   Barriers to Discharge Inaccessible home environment   PT Barriers   Physical Impairment Decreased strength;Decreased mobility;Orthopedic restrictions   Functional Limitation Stair negotiation;Ramp negotiation; Walking   Plan   Treatment/Interventions ADL retraining;Functional transfer training;LE strengthening/ROM; Elevations; Therapeutic exercise; Endurance training;Cognitive reorientation;Patient/family training;Equipment eval/education; Bed mobility;Gait training   Progress Progressing toward goals   Recommendation   Recommendation Home PT; Home with family support   Equipment Recommended Wheelchair   PT Therapy Minutes   PT Time In 0900   PT Time Out 1000   PT Total Time (minutes) 60   PT Mode of treatment - Individual (minutes) 60   PT Mode of treatment - Concurrent (minutes) 0   PT Mode of treatment - Group (minutes) 0   PT Mode of treatment - Co-treat (minutes) 0   PT Mode of Teatment - Total time(minutes) 60 minutes   Therapy Time missed   Time missed?  No     Cholo Mayo, PTA
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
## 2018-10-10 NOTE — PROGRESS NOTES
10/10/18 1340   Pain Assessment   Pain Assessment No/denies pain   Pain Score No Pain   Restrictions/Precautions   Precautions Bed/chair alarms; Fall Risk;Supervision on toilet/commode   RLE Weight Bearing Per Order NWB   Braces or Orthoses TLSO   QI: Sit to Lying   Assistance Needed Supervision   Assistance Provided by [place] No physical assistance   Sit to Lying CARE Score 4   QI: Sit to Stand   Assistance Needed Supervision   Assistance Provided by [place] No physical assistance   Sit to Stand CARE Score 4   QI: Chair/Bed-to-Chair Transfer   Assistance Needed Supervision;Verbal cues   Assistance Provided by [place] No physical assistance   Chair/Bed-to-Chair Transfer CARE Score 4   Transfer Bed/Chair/Wheelchair   Limitations Noted In Balance; Endurance;Problem Solving;UE Strength;LE Strength   Adaptive Equipment Roller Walker   Findings Pt continues to require VC's for set up of transfers and for safety  Bed, Chair, Wheelchair Transfer (FIM) 5 - Patient requires supervision/monitoring   Right Upper Extremity- Strength   R Shoulder Flexion; Extension;ABduction;Horizontal ABduction   R Elbow Elbow flexion;Elbow extension   R Position Seated   Equipment Dumbbell   R Weight/Reps/Sets 2# dumbbell moving through all planes 3 x10 each to increase UB strength and endurnace for increased independence with functional transfers and endurance  Left Upper Extremity-Strength   L Shoulder Flexion;ABduction; Extension;Horizontal ABduction   L Elbow Elbow flexion;Elbow extension   Equipment Dumbell   L Weights/Reps/Sets 2# dumbbell moving through all planes 3 x10 each to increase UB strength and endurnace for increased independence with functional transfers and endurance  Cognition   Overall Cognitive Status WFL   Arousal/Participation Alert; Cooperative   Attention Within functional limits   Orientation Level Oriented X4   Memory Within functional limits   Following Commands Follows multistep commands with increased time or repetition   Activity Tolerance   Activity Tolerance Patient tolerated treatment well   Assessment   Treatment Assessment Pt participated in skilled OT services with focus on functional transfers, bed mobility, and strengthening  Pt continues to require VC's for proper set up and safety with functional transfers  Pt engaged in various UB strengthening exercises to promote UB strength for increased independence with transfers and compliance with NWB to RLE  Pt tolerates session well overall with rest breaks to manage fatigue as needed  Pt will continue to benefit from skilled OT services with focus on safety with functional transfers, strengthening, endurance, and standing balance  Prognosis Good   Problem List Decreased strength;Decreased endurance; Impaired balance;Decreased mobility;Orthopedic restrictions;Decreased safety awareness   Plan   Treatment/Interventions ADL retraining;Functional transfer training; Therapeutic exercise; Endurance training;Equipment eval/education; Compensatory technique education   Progress Progressing toward goals   Recommendation   OT Discharge Recommendation Home with family support   OT Therapy Minutes   OT Time In 1340   OT Time Out 1410   OT Total Time (minutes) 30   OT Mode of treatment - Individual (minutes) 30   OT Mode of treatment - Concurrent (minutes) 0   OT Mode of treatment - Group (minutes) 0   OT Mode of treatment - Co-treat (minutes) 0   OT Mode of Teatment - Total time(minutes) 30 minutes   Therapy Time missed   Time missed?  No

## 2018-10-10 NOTE — PROGRESS NOTES
10/10/18 1105   Pain Assessment   Pain Assessment No/denies pain   Restrictions/Precautions   Precautions Bed/chair alarms; Fall Risk   RLE Weight Bearing Per Order NWB   Braces or Orthoses TLSO   QI: Sit to Stand   Assistance Needed Supervision   Assistance Provided by [place] No physical assistance   Comment RW   Sit to Stand CARE Score 4   QI: Chair/Bed-to-Chair Transfer   Assistance Needed Supervision   Assistance Provided by [place] No physical assistance   Comment SPT w/ RW   Chair/Bed-to-Chair Transfer CARE Score 4   Transfer Bed/Chair/Wheelchair   Positioning Concerns Cognition   Limitations Noted In Balance; Endurance;Problem Solving;LE Strength   Adaptive Equipment Roller Walker   Stand Pivot Supervision   Sit to Stand Supervision   Stand to Sit Supervision   Findings Pt noted to ask "do I have to use the RW to move from the bed to the recliner?"  OT provided edu on RW providing inc safety due to pt 's balance, pt receptive to feedback  Pt cont to require VC for safety awareness during fxnl transfers and not appropriate to progress to BRP at this time  Bed, Chair, Wheelchair Transfer (FIM) 5 - Patient requires supervision/monitoring   Therapeutic Excerise-Strength   UE Strength Yes   Right Upper Extremity- Strength   Equipment Dowel;Dumbbell   R Weight/Reps/Sets 2# dumbell 2x15 fwd chest press, 2# dowel 2x15 overhead press and rowing   RUE Strength Comment Pt engaged in b/l UE strengthening w/ focus on endurance and overall strength to inc pt indep w/ ADLs and all fxnl transfers  Left Upper Extremity-Strength   LUE Strength Comment See RUE for detail  Cognition   Overall Cognitive Status WFL   Arousal/Participation Alert; Cooperative   Attention Within functional limits   Orientation Level Oriented X4   Memory Within functional limits   Following Commands Follows multistep commands with increased time or repetition   Comments Pt cont to require A to recall spinal precautions  OT provided edu on spinal precautions and at end of session when prompted, pt still required prompt to recall 3/3 precautions  Additional Activities   Additional Activities Comments Pt participated in w/c mobility t/o session and noted to have improved carryover for w/c leg rest management  Pt cont to require VC to lock w/c brakes prior to reaching to pet therapy dog during session  Activity Tolerance   Activity Tolerance Patient tolerated treatment well   Assessment   Treatment Assessment Pt participated in skilled OT Tx session w/ focus on fxnl transfers, UE strengthening, w/c mobility and safety, edu on spinal precautions, and TLSO brace management  Pt noted w/ improved carryover for managing TLSO brace and required no VC to don/doff while seated EOB  Pt would cont to benefit from skilled OT Services w/ focus on TLSO managment, recall of spinal precautions, stand tolerance (LLE), dynamic balance, endurance, safety awareness, and UE strengthening to maximize pt indep w/ ADLs and all fxnl transfers  Prognosis Good   Problem List Decreased strength;Decreased endurance; Impaired balance;Decreased mobility;Orthopedic restrictions;Decreased safety awareness   Barriers to Discharge Inaccessible home environment   Plan   Treatment/Interventions ADL retraining;Functional transfer training;LE strengthening/ROM; Therapeutic exercise; Endurance training;Patient/family training;Equipment eval/education; Bed mobility; Compensatory technique education   Progress Progressing toward goals   Recommendation   OT Discharge Recommendation Home with family support   OT Therapy Minutes   OT Time In 1105   OT Time Out 1135   OT Total Time (minutes) 30   OT Mode of treatment - Individual (minutes) 30   OT Mode of treatment - Concurrent (minutes) 0   OT Mode of treatment - Group (minutes) 0   OT Mode of treatment - Co-treat (minutes) 0   OT Mode of Teatment - Total time(minutes) 30 minutes   Therapy Time missed   Time missed?  No
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
## 2018-10-11 LAB
ANION GAP SERPL CALCULATED.3IONS-SCNC: 6 MMOL/L (ref 4–13)
BASOPHILS # BLD AUTO: 0.04 THOUSANDS/ΜL (ref 0–0.1)
BASOPHILS NFR BLD AUTO: 1 % (ref 0–1)
BUN SERPL-MCNC: 12 MG/DL (ref 5–25)
CALCIUM SERPL-MCNC: 8.6 MG/DL (ref 8.3–10.1)
CHLORIDE SERPL-SCNC: 101 MMOL/L (ref 100–108)
CO2 SERPL-SCNC: 25 MMOL/L (ref 21–32)
CREAT SERPL-MCNC: 0.56 MG/DL (ref 0.6–1.3)
EOSINOPHIL # BLD AUTO: 0.11 THOUSAND/ΜL (ref 0–0.61)
EOSINOPHIL NFR BLD AUTO: 2 % (ref 0–6)
ERYTHROCYTE [DISTWIDTH] IN BLOOD BY AUTOMATED COUNT: 14.3 % (ref 11.6–15.1)
GFR SERPL CREATININE-BSD FRML MDRD: 104 ML/MIN/1.73SQ M
GLUCOSE P FAST SERPL-MCNC: 179 MG/DL (ref 65–99)
GLUCOSE SERPL-MCNC: 127 MG/DL (ref 65–140)
GLUCOSE SERPL-MCNC: 179 MG/DL (ref 65–140)
GLUCOSE SERPL-MCNC: 182 MG/DL (ref 65–140)
GLUCOSE SERPL-MCNC: 183 MG/DL (ref 65–140)
GLUCOSE SERPL-MCNC: 256 MG/DL (ref 65–140)
HCT VFR BLD AUTO: 29.5 % (ref 34.8–46.1)
HGB BLD-MCNC: 9.1 G/DL (ref 11.5–15.4)
IMM GRANULOCYTES # BLD AUTO: 0.03 THOUSAND/UL (ref 0–0.2)
IMM GRANULOCYTES NFR BLD AUTO: 0 % (ref 0–2)
LYMPHOCYTES # BLD AUTO: 2.37 THOUSANDS/ΜL (ref 0.6–4.47)
LYMPHOCYTES NFR BLD AUTO: 33 % (ref 14–44)
MCH RBC QN AUTO: 27.7 PG (ref 26.8–34.3)
MCHC RBC AUTO-ENTMCNC: 30.8 G/DL (ref 31.4–37.4)
MCV RBC AUTO: 90 FL (ref 82–98)
MONOCYTES # BLD AUTO: 0.46 THOUSAND/ΜL (ref 0.17–1.22)
MONOCYTES NFR BLD AUTO: 6 % (ref 4–12)
NEUTROPHILS # BLD AUTO: 4.25 THOUSANDS/ΜL (ref 1.85–7.62)
NEUTS SEG NFR BLD AUTO: 58 % (ref 43–75)
NRBC BLD AUTO-RTO: 0 /100 WBCS
PLATELET # BLD AUTO: 510 THOUSANDS/UL (ref 149–390)
PMV BLD AUTO: 9.1 FL (ref 8.9–12.7)
POTASSIUM SERPL-SCNC: 4.7 MMOL/L (ref 3.5–5.3)
RBC # BLD AUTO: 3.28 MILLION/UL (ref 3.81–5.12)
SODIUM SERPL-SCNC: 132 MMOL/L (ref 136–145)
WBC # BLD AUTO: 7.26 THOUSAND/UL (ref 4.31–10.16)

## 2018-10-11 PROCEDURE — 80048 BASIC METABOLIC PNL TOTAL CA: CPT | Performed by: PHYSICIAN ASSISTANT

## 2018-10-11 PROCEDURE — G0515 COGNITIVE SKILLS DEVELOPMENT: HCPCS

## 2018-10-11 PROCEDURE — 97530 THERAPEUTIC ACTIVITIES: CPT

## 2018-10-11 PROCEDURE — 82948 REAGENT STRIP/BLOOD GLUCOSE: CPT

## 2018-10-11 PROCEDURE — 97127 HB COGNITIVE SKILLS DEVELOPMENT, EACH 15 MUNUTES: CPT

## 2018-10-11 PROCEDURE — 97535 SELF CARE MNGMENT TRAINING: CPT

## 2018-10-11 PROCEDURE — 85025 COMPLETE CBC W/AUTO DIFF WBC: CPT | Performed by: PHYSICIAN ASSISTANT

## 2018-10-11 PROCEDURE — 97110 THERAPEUTIC EXERCISES: CPT

## 2018-10-11 RX ORDER — GLIMEPIRIDE 2 MG/1
4 TABLET ORAL
Status: DISCONTINUED | OUTPATIENT
Start: 2018-10-12 | End: 2018-10-15 | Stop reason: HOSPADM

## 2018-10-11 RX ADMIN — METHOCARBAMOL 500 MG: 500 TABLET ORAL at 06:14

## 2018-10-11 RX ADMIN — LIDOCAINE 1 PATCH: 50 PATCH CUTANEOUS at 09:15

## 2018-10-11 RX ADMIN — ENOXAPARIN SODIUM 40 MG: 40 INJECTION SUBCUTANEOUS at 09:14

## 2018-10-11 RX ADMIN — GABAPENTIN 100 MG: 100 CAPSULE ORAL at 16:14

## 2018-10-11 RX ADMIN — METFORMIN HYDROCHLORIDE 1000 MG: 500 TABLET ORAL at 16:14

## 2018-10-11 RX ADMIN — METHOCARBAMOL 500 MG: 500 TABLET ORAL at 11:33

## 2018-10-11 RX ADMIN — METHOCARBAMOL 500 MG: 500 TABLET ORAL at 23:32

## 2018-10-11 RX ADMIN — OXYCODONE HYDROCHLORIDE 2.5 MG: 5 TABLET ORAL at 09:15

## 2018-10-11 RX ADMIN — INSULIN LISPRO 3 UNITS: 100 INJECTION, SOLUTION INTRAVENOUS; SUBCUTANEOUS at 11:33

## 2018-10-11 RX ADMIN — GLIMEPIRIDE 2 MG: 2 TABLET ORAL at 08:27

## 2018-10-11 RX ADMIN — GABAPENTIN 100 MG: 100 CAPSULE ORAL at 09:14

## 2018-10-11 RX ADMIN — INSULIN LISPRO 1 UNITS: 100 INJECTION, SOLUTION INTRAVENOUS; SUBCUTANEOUS at 21:57

## 2018-10-11 RX ADMIN — INSULIN LISPRO 1 UNITS: 100 INJECTION, SOLUTION INTRAVENOUS; SUBCUTANEOUS at 08:27

## 2018-10-11 RX ADMIN — METHOCARBAMOL 500 MG: 500 TABLET ORAL at 18:02

## 2018-10-11 RX ADMIN — METFORMIN HYDROCHLORIDE 1000 MG: 500 TABLET ORAL at 08:26

## 2018-10-11 RX ADMIN — LISINOPRIL 10 MG: 10 TABLET ORAL at 09:14

## 2018-10-11 RX ADMIN — PRAVASTATIN SODIUM 20 MG: 20 TABLET ORAL at 16:14

## 2018-10-11 RX ADMIN — GABAPENTIN 100 MG: 100 CAPSULE ORAL at 21:57
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
## 2018-10-11 NOTE — PROGRESS NOTES
10/11/18 1435   Pain Assessment   Pain Assessment 0-10   Pain Score No Pain   Restrictions/Precautions   Precautions Bed/chair alarms; Fall Risk   Weight Bearing Restrictions Yes   RLE Weight Bearing Per Order NWB   Braces or Orthoses TLSO   Cognition   Overall Cognitive Status WFL   Arousal/Participation Alert; Cooperative   Attention Within functional limits   Orientation Level Oriented X4   Memory Within functional limits   Following Commands Follows multistep commands without difficulty   Subjective   Subjective reports she is doing well and wants to do as much as she can for herself so she can do it at home   QI: Roll Left and Right   Assistance Needed Supervision   Roll Left and Right CARE Score 4   QI: Sit to Lying   Assistance Needed Supervision   Sit to Lying CARE Score 4   QI: Lying to Sitting on Side of Bed   Assistance Needed Supervision   Lying to Sitting on Side of Bed CARE Score 4   QI: Sit to Stand   Assistance Needed Supervision   Sit to Stand CARE Score 4   QI: Chair/Bed-to-Chair Transfer   Assistance Needed Supervision   Chair/Bed-to-Chair Transfer CARE Score 4   Transfer Bed/Chair/Wheelchair   Limitations Noted In Balance; Endurance;UE Strength;LE Strength   Stand Pivot Supervision   Sit to Stand Supervision   Stand to Sit Supervision   Supine to Sit Supervision   Sit to Supine Supervision   Car Transfer Supervision   Bed, Chair, Wheelchair Transfer (FIM) 5 - Patient requires supervision/monitoring   QI: Car Transfer   Assistance Needed Supervision   Car Transfer CARE Score 4   QI: Walk 10 Feet   Assistance Needed Supervision   Comment CS   Walk 10 Feet CARE Score 4   Ambulation   Does the patient walk? 2  Yes   Primary Discharge Mode of Locomotion Wheelchair   Walk Assist Level Supervision   Gait Pattern Hopping   Assist Device Roller Walker   Distance Walked (feet) 10 ft  (x2)   Limitations Noted In Endurance; Heel Strike;Speed;Strength;Swing   Walking (FIM) 1 - Patient ambulates less than 49 feet regardless of assist/device/set up   QI: Wheel 50 Feet with Two [place] 50 Feet with Two Turns CARE Score 4   QI: Wheel 150 400 [facility] 150 Feet CARE Score 4   Wheelchair mobility   QI: Does the patient use a wheelchair? 1  Yes   QI: Indicate the type of wheelchair 1  Manual   Wheelchair Assist Level Supervision   Method Right upper extremity; Left upper extremity   Needs Assist With Remove Leg Rest;Replace Leg Rest   Distance Level Surface (feet) 350 ft   Wheelchair (FIM) 5 - Patient requires supervision/monitoring AND wheels distance 150 feet or more, no rest   Therapeutic Interventions   Strengthening LAQ, ivan 10x2 3# CW LLE; adduction yellow ball 10x2; tricep ext red TB and cone 10x2   Assessment   Treatment Assessment pt xfers with set up adn maintains WB status throughout; pt continues to have some trouble with putting the leg rests on and taking them off, instruction given and after a few trials, she is able to do so; able to take small hops to go 10'x2 with RW and is fatigued afterwards; when performing stand pivot xfers, she wiggles her foot to turn and does not try to hop, this works but may be unsafe if her ankle rolls or if her sock gets caught, continue to encourage that she hops to complete xfers; pt has good balance when she is standing with RW; able to don and doff brace correctly with no VC or physical assistance; continue POC as per PT to increase strength, balance, and endurance for functional and safe mobility/activity   Problem List Decreased strength;Decreased range of motion;Decreased endurance; Impaired balance;Decreased mobility;Orthopedic restrictions   Barriers to Discharge Inaccessible home environment   PT Barriers   Functional Limitation Ramp negotiation;Stair negotiation;Transfers; Wheelchair management   Plan   Treatment/Interventions ADL retraining;Functional transfer training;LE strengthening/ROM; Elevations; Therapeutic exercise; Endurance training;Patient/family training;Equipment eval/education; Bed mobility;Gait training   Progress Progressing toward goals   Recommendation   Recommendation Home PT; Home with family support; Outpatient PT   Equipment Recommended Wheelchair;Walker   PT Therapy Minutes   PT Time In 1435   PT Time Out 5180   PT Total Time (minutes) 60   PT Mode of treatment - Individual (minutes) 60   PT Mode of treatment - Concurrent (minutes) 0   PT Mode of treatment - Group (minutes) 0   PT Mode of treatment - Co-treat (minutes) 0   PT Mode of Teatment - Total time(minutes) 60 minutes   Therapy Time missed   Time missed?  No

## 2018-10-11 NOTE — PROGRESS NOTES
10/11/18 0930   Pain Assessment   Pain Assessment 0-10   Pain Score 5   Pain Type Acute pain   Pain Location Leg   Pain Orientation Right   Hospital Pain Intervention(s) Repositioned;Distraction   Restrictions/Precautions   Precautions Bed/chair alarms; Fall Risk;Supervision on toilet/commode   RLE Weight Bearing Per Order NWB   Braces or Orthoses TLSO   Memory Skills   Memory (FIM) 5 - [place] cues patient   Social Interaction (FIM) 6 - Interacts appropriately with others BUT requires extra  time   Speech/Language/Cognition Assessmetn   Treatment Assessment Pt engaging in [facility] recall of daily events where pt was able to recall visitors, prior meal items, prior therapy activities at supervision level  Engaged in calendar activity which pt was to answer questions related to information presented  Pt was 9/10 accurate w/ task, increasing to 10/10 upon review of information  Next pt was able to complete functional money management task given addition of coin calculations  Pt was 18/20 accurate w/ task, noting ability to correct errors, increasing accuracy to 20/20  Lastly, pt engaged in education of TBI and STM deficits  Educated on [facility] strategies, such as keeping lists, keeping to a consistent routine, having a place for items to recall where items are, etc were discussed and pt already completes at home prior to this hospitalization  Will continue to benefit from SLP services at this time to maximize cognitive skills at this time  SLP Therapy Minutes   SLP Time In 0930   SLP Time Out 1000   SLP Total Time (minutes) 30   SLP Mode of treatment - Individual (minutes) 30   SLP Mode of treatment - Concurrent (minutes) 0   SLP Mode of treatment - Group (minutes) 0   SLP Mode of treatment - Co-treat (minutes) 0   SLP Mode of Teatment - Total time(minutes) 30 minutes   Therapy Time missed   Time missed?  No   Daily FIM Score   Problem solving (FIM) 5 - Solves basic problems 90% of time   Comprehension (FIM) 6 - Has only MILD difficulty with complex/abstract info   Expression (FIM) 6 - Has only MILD difficulty with complex/abstract info

## 2018-10-11 NOTE — PLAN OF CARE
Discharge to home or other facility with appropriate resources Progressing      Absence or prevention of progression during hospitalization Progressing      Nutrient/Hydration intake appropriate for improving, restoring or maintaining nutritional needs Progressing      Verbalizes/displays adequate comfort level or baseline comfort level Progressing      Patient will remain free of falls Progressing      Skin integrity is maintained or improved Progressing      Maintain or return to baseline ADL function Progressing      Maintain or return mobility status to optimal level Progressing

## 2018-10-11 NOTE — PROGRESS NOTES
10/11/18 0700   Pain Assessment   Pain Assessment 0-10   Pain Score 5   Pain Type Acute pain   Pain Location Back   Pain Orientation Right; Lower   Pain Descriptors Aching   Pain Frequency Constant/continuous   Pain Onset Ongoing   Clinical Progression Gradually improving   Hospital Pain Intervention(s) Rest   Response to Interventions Pt tolerated Tx session  Restrictions/Precautions   Precautions Bed/chair alarms; Fall Risk;Pain   Braces or Orthoses TLSO   Eating Assessment   Meal Assessed Breakfast   QI: Swallowing/Nutritional Status Regular food   Intake Mode PO   Opens Packages Yes   Eating (FIM) 7 - Patient completely independent   QI: Oral Hygiene   Assistance Needed Supervision   Assistance Provided by [place] No physical assistance   Comment w/c level   Oral Hygiene CARE Score 4   Grooming   Able To Initiate Tasks;Comb/Brush Hair;Wash/Dry Face;Brush/Clean Teeth;Wash/Dry Hands   Limitation Noted In Strength   Findings Pt seated at w/c level to complete grooming 2* fatigue from completing bathing routine prior  Grooming (FIM) 5 - Patient requires supervision/monitoring   QI: Shower/Bathe Self   Assistance Needed Supervision   Assistance Provided by [place] No physical assistance   Shower/Bathe Self CARE Score 4   Bathing   Assessed Bath Style Sponge Bath   Anticipated D/C Bath Style Sponge Bath   Able to Gather/Transport Yes   Able to Adjust Water Temperature Yes   Able to Wash/Rinse/Dry (body part) Left Arm;Right Arm;L Upper Leg;R Upper Leg;L Lower Leg/Foot;Chest;Abdomen;Perineal Area; Buttocks   Limitations Noted in Balance;Problem Solving; Safety;Strength   Positioning Seated;Standing   Findings  Pt completes majority of bathing routine seated w/c level  Pt cont to require A to bathe RLE 2* spinal precautions and decreased LE ROM  Pt is able to utilize cross leg method to bathe LLE  Pt cont to require VC to don TLSO prior to standing for bathing perineal/buttocks area   Pt also required VC to pull velcro straps tight on TLSO before standing  Bathing (FIM) 4 - Patient completes 8/10 or 9/10 parts   Tub/Shower Transfer   Not Assessed Safety; Medical   QI: Upper Body Dressing   Assistance Needed Supervision   Assistance Provided by [place] No physical assistance   Comment Pt noted w/ inc indep and carryover for donning/doffing TLSO  Upper Body Dressing CARE Score 4   QI: Lower Body Dressing   Assistance Needed Supervision   Assistance Provided by [place] No physical assistance   Comment Pt able to utilize cross leg method to thread pants over b/l LE, pt CS in stance w/ sink for unilateral UE support to don pants over hips  Lower Body Dressing CARE Score 4   QI: Putting On/Taking Off Footwear   Assistance Needed Supervision   Assistance Provided by [place] No physical assistance   Comment Pt utilizes cross leg method  Putting On/Taking Off Footwear CARE Score 4   Dressing/Undressing Clothing   Remove UB Clothes Pullover Shirt   Remove LB Clothes Shorts;Socks   Don UB Clothes Pullover Shirt   Don LB Clothes Shorts;Socks   Limitations Noted In Balance; Endurance;Problem Solving; Safety;Strength   Positioning Supported Sit;Standing   Findings See above note for detail      UB Dressing (FIM) 5 - Patient requires supervision/monitoring   LB Dressing (FIM) 5 - Patient requires supervision/monitoring   QI: Roll Left and Right   Assistance Needed Supervision   Assistance Provided by [place] No physical assistance   Roll Left and Right CARE Score 4   QI: Lying to Sitting on Side of Bed   Assistance Needed Supervision   Assistance Provided by [place] No physical assistance   Lying to Sitting on Side of Bed CARE Score 4   QI: Sit to [address] Provided by [place] No physical assistance   Comment RW   Sit to Stand CARE Score 4   QI: Chair/Bed-to-Chair Transfer   Assistance Needed Supervision   Assistance Provided by [place] No physical assistance   Comment SPT w/ RW or no AD Chair/Bed-to-Chair Transfer CARE Score 4   Transfer Bed/Chair/Wheelchair   Positioning Concerns Cognition   Limitations Noted In Balance; Endurance;Problem Solving;LE Strength;UE Strength   Adaptive Equipment Roller Walker   Stand Pivot Supervision   Sit to Stand Supervision   Stand to Sit Supervision   Supine to Sit Supervision   Findings Pt cont to require VC for safety and set up  OT session focused on reinforcing safety for set up and hand placement for transfers  Bed, Chair, Wheelchair Transfer (FIM) 5 - Patient requires supervision/monitoring   QI: [address] Provided by [place] No physical assistance   L[address] Score 4   Toileting   Able to [address] down yes, up yes  Able to Manage Clothing Closures Yes   Manage Hygiene Bladder   Limitations Noted In Safety;Balance   Adaptive Equipment Grab Bar   Findings Pt S w/ use of GB to steady self for CM over hips and perineal hygiene  Pt cont to require VC from OT for unilateral hand placement on GB or RW to steady self  Toileting (FIM) 5 - Patient requires supervision/monitoring   QI: Toilet Transfer   Assistance Needed Supervision   Assistance Provided by [place] No physical assistance   Toilet Transfer CARE Score 4   Toilet Transfer   Surface Assessed Standard Commode  (over toilet)   Transfer Technique Stand Pivot   Limitations Noted In Balance; Endurance;Problem Solving;LE Strength; Safety   Adaptive Equipment Grab Bar   Findings Pt S and cont to require VC for safe set up  Pt utilizes GB for SPT w/c<>toilet     Toilet Transfer (FIM) 5 - Patient requires supervision/monitoring   Cognition   Overall Cognitive Status WFL   Arousal/Participation Alert;Arousable   Attention Within functional limits   Orientation Level Oriented X4   Memory Within functional limits   Following Commands Follows multistep commands with increased time or repetition   Comments Pt able to recall 3/3 spinal precautions this session w/ increase time to process  Activity Tolerance   Activity Tolerance Patient tolerated treatment well   Assessment   Treatment Assessment Pt participated in skilled OT Tx session w/ focus on completing ADL routine and carryover for safety/set up w/ all fxl transfers  Pt cont to complete transfers and ADL fxn at S level 2* pt requiring occassional VC for carryover w/ safety, set up, and to maintain spinal precautions  Pt able to retrieve clothing this session from closet at w/c level w/ VC to lock w/c brakes  Pt completed fxnl w/c mobility t/o room this session to inc pt indep and endurance  Cont OT POC w/ focus on safety awareness, endurance, UE strengthening, and edu on spinal precautions to inc pt safety and indep w/ ADLs and all fxnl transfers  Prognosis Good   Problem List Decreased strength;Decreased range of motion;Decreased endurance; Impaired balance;Decreased mobility; Decreased safety awareness;Orthopedic restrictions;Pain   Barriers to Discharge Inaccessible home environment   Plan   Treatment/Interventions ADL retraining;Functional transfer training; Therapeutic exercise; Endurance training;Cognitive reorientation;Patient/family training;Equipment eval/education; Bed mobility; Compensatory technique education   Progress Progressing toward goals   Recommendation   OT Discharge Recommendation Home with family support   OT Therapy Minutes   OT Time In 0700   OT Time Out 0830   OT Total Time (minutes) 90   OT Mode of treatment - Individual (minutes) 90   OT Mode of treatment - Concurrent (minutes) 0   OT Mode of treatment - Group (minutes) 0   OT Mode of treatment - Co-treat (minutes) 0   OT Mode of Teatment - Total time(minutes) 90 minutes   Therapy Time missed   Time missed?  No

## 2018-10-11 NOTE — PROGRESS NOTES
10/11/18 1100   Pain Assessment   Pain Assessment 0-10   Pain Score 6   Pain Type Acute pain   Pain Location Leg   Pain Orientation Right   Pain Descriptors Aching   Pain Frequency Constant/continuous   Hospital Pain Intervention(s) Medication (See MAR); Repositioned;Relaxation technique; Rest   Restrictions/Precautions   Precautions Bed/chair alarms; Fall Risk;Pain   Weight Bearing Restrictions Yes   RUE Weight Bearing Per Order WBAT   LUE Weight Bearing Per Order WBAT   RLE Weight Bearing Per Order NWB   LLE Weight Bearing Per Order WBAT   Cognition   Overall Cognitive Status [facility]   Arousal/Participation Alert; Cooperative   Attention Within functional limits   Orientation Level Oriented X4   Following Commands Follows multistep commands without difficulty   Subjective   Subjective C/o RLE pain; agreeable to PT; neice present for session  QI: Roll Left and Right   Assistance Needed Supervision   Roll Left and Right CARE Score 4   QI: Sit to Lying   Assistance Needed Supervision   Sit to Lying CARE Score 4   QI: Lying to Sitting on Side of Bed   Assistance Needed Supervision   Lying to Sitting on Side of Bed CARE Score 4   QI: Sit to Stand   Assistance Needed Supervision   Sit to Stand CARE Score 4   QI: Chair/Bed-to-Chair Transfer   Assistance Needed Supervision   Chair/Bed-to-Chair Transfer CARE Score 4   Transfer Bed/Chair/Wheelchair   Limitations Noted In Balance; Endurance;Pain Management;UE Strength;LE Strength   Adaptive Equipment None   Sit Pivot Supervision   Sit to Stand Supervision   Stand to Sit Supervision   Supine to Sit Supervision   Sit to Supine Supervision   Bed, Chair, Wheelchair Transfer (FIM) 5 - Patient requires supervision/monitoring   QI: Walk 10 Feet   Assistance Needed Incidental touching;Verbal cues; Adaptive equipment   Assistance Provided by [place] No physical assistance   Comment 10ft with RW, hopping   Walk 10 Feet CARE Score 4   QI: Walk 50 Feet with Two Turns   Reason if not Attempted Safety concerns   Walk 50 Feet with Two Turns CARE Score 88   QI: Walk 150 Feet   Reason if not Attempted Safety concerns   Walk 150 Feet CARE Score 88   QI: Walking 10 Feet on Uneven Surfaces   Reason if not Attempted Safety concerns   Walking 10 Feet on Uneven Surfaces CARE Score 88   Ambulation   Does the patient walk? 2  Yes   Primary Discharge Mode of Locomotion Wheelchair   Walk Assist Level Close Supervision   Gait Pattern Hopping   Assist Device Roller Walker   Distance Walked (feet) 10 ft   Limitations Noted In Endurance;Speed;Strength;Swing   Walking (FIM) 1 - Patient ambulates less than 49 feet regardless of assist/device/set up   QI: Wheel 50 Feet with Two [address] 50 Feet with Two Turns CARE Score 6   QI: Wheel 150 [address] 150 Feet CARE Score 6   Wheelchair mobility   QI: Does the patient use a wheelchair? 1  Yes   QI: Indicate the type of wheelchair 1  Manual   Wheelchair Assist Level Modified Independent   Method Right upper extremity; Left upper extremity   Needs Assist With Remove Leg Rest;Replace Leg Rest;Curbs   Distance Level Surface (feet) 150 ft   Wheelchair (FIM) 6 - Patient wheels 150 feet or more, no rests AND independently, timely and safely   QI: 1 Step (Curb)   Reason if not Attempted Activity not applicable   1 Step (Curb) CARE Score 9   QI: 4 Steps   Reason if not Attempted Safety concerns   4 Steps CARE Score 88   QI: 12 Steps   Reason if not Attempted Safety concerns   12 Steps CARE Score 88   QI: Toilet Transfer   Assistance Needed Supervision   Toilet Transfer CARE Score 4   Toilet Transfer   Surface Assessed Raised Toilet   Limitations Noted In UE Strength;LE Strength; Endurance;Balance   Adaptive Equipment Grab Bar   Toilet Transfer (FIM) 5 - Patient requires supervision/monitoring   Therapeutic Interventions   Strengthening Standing RLE TE at RW 20x: hip flex, knee flex, hip abd    Seated RLE TE 20x: LAQ, hip flex, ankle df/pf   Assessment   Treatment Assessment Pt indep maintaining NWB RLE this session  She req supervision for bed mobility, transfers and hopping/amb with RW with distance limited to 10ft due to pt c/o fatigue and UE weakness  Anticipating primary w/c level for d/c which is tent scheduled for 10/15/18  She will cont to benefit from skilled PT to improve strength/ROM, balance, endurance and indep and safety with all mobility  Family/Caregiver Present Yes   Problem List Decreased strength;Decreased range of motion;Decreased endurance; Impaired balance;Decreased mobility; Decreased safety awareness;Orthopedic restrictions;Pain   PT Barriers   Physical Impairment Decreased strength;Decreased endurance;Decreased range of motion; Impaired balance;Decreased mobility; Decreased safety awareness;Orthopedic restrictions;Pain   Functional Limitation Car transfers; Ramp negotiation;Stair negotiation;Standing;Transfers; Walking   Plan   Treatment/Interventions Functional transfer training;LE strengthening/ROM; Elevations; Therapeutic exercise; Endurance training;Patient/family training;Equipment eval/education   Progress Progressing toward goals   Recommendation   Recommendation Home with family support;Home PT   PT Therapy Minutes   PT Time In 1100   PT Time Out 1130   PT Total Time (minutes) 30   PT Mode of treatment - Individual (minutes) 30   PT Mode of treatment - Concurrent (minutes) 0   PT Mode of treatment - Group (minutes) 0   PT Mode of treatment - Co-treat (minutes) 0   PT Mode of Teatment - Total time(minutes) 30 minutes   Therapy Time missed   Time missed?  No

## 2018-10-12 ENCOUNTER — APPOINTMENT (INPATIENT)
Dept: RADIOLOGY | Facility: HOSPITAL | Age: 58
DRG: 949 | End: 2018-10-12
Attending: PHYSICAL MEDICINE & REHABILITATION
Payer: COMMERCIAL

## 2018-10-12 LAB
GLUCOSE SERPL-MCNC: 159 MG/DL (ref 65–140)
GLUCOSE SERPL-MCNC: 161 MG/DL (ref 65–140)
GLUCOSE SERPL-MCNC: 162 MG/DL (ref 65–140)
GLUCOSE SERPL-MCNC: 168 MG/DL (ref 65–140)

## 2018-10-12 PROCEDURE — 97535 SELF CARE MNGMENT TRAINING: CPT

## 2018-10-12 PROCEDURE — 97530 THERAPEUTIC ACTIVITIES: CPT

## 2018-10-12 PROCEDURE — 99232 SBSQ HOSP IP/OBS MODERATE 35: CPT | Performed by: PHYSICAL MEDICINE & REHABILITATION

## 2018-10-12 PROCEDURE — 97110 THERAPEUTIC EXERCISES: CPT

## 2018-10-12 PROCEDURE — 72080 X-RAY EXAM THORACOLMB 2/> VW: CPT

## 2018-10-12 PROCEDURE — 97542 WHEELCHAIR MNGMENT TRAINING: CPT

## 2018-10-12 PROCEDURE — 82948 REAGENT STRIP/BLOOD GLUCOSE: CPT

## 2018-10-12 PROCEDURE — 73590 X-RAY EXAM OF LOWER LEG: CPT

## 2018-10-12 RX ORDER — LISINOPRIL 10 MG/1
10 TABLET ORAL DAILY
Qty: 30 TABLET | Refills: 3 | Status: SHIPPED | OUTPATIENT
Start: 2018-10-16 | End: 2018-10-15

## 2018-10-12 RX ORDER — GLIMEPIRIDE 4 MG/1
4 TABLET ORAL
Qty: 30 TABLET | Refills: 3 | Status: SHIPPED | OUTPATIENT
Start: 2018-10-16 | End: 2018-10-15

## 2018-10-12 RX ORDER — PRAVASTATIN SODIUM 20 MG
20 TABLET ORAL
Qty: 30 TABLET | Refills: 3 | Status: SHIPPED | OUTPATIENT
Start: 2018-10-15 | End: 2018-10-15

## 2018-10-12 RX ORDER — METHOCARBAMOL 500 MG/1
500 TABLET, FILM COATED ORAL EVERY 6 HOURS PRN
Status: DISCONTINUED | OUTPATIENT
Start: 2018-10-12 | End: 2018-10-15 | Stop reason: HOSPADM

## 2018-10-12 RX ADMIN — ENOXAPARIN SODIUM 40 MG: 40 INJECTION SUBCUTANEOUS at 10:00

## 2018-10-12 RX ADMIN — LISINOPRIL 10 MG: 10 TABLET ORAL at 10:00

## 2018-10-12 RX ADMIN — INSULIN LISPRO 1 UNITS: 100 INJECTION, SOLUTION INTRAVENOUS; SUBCUTANEOUS at 11:23

## 2018-10-12 RX ADMIN — GABAPENTIN 100 MG: 100 CAPSULE ORAL at 10:00

## 2018-10-12 RX ADMIN — METFORMIN HYDROCHLORIDE 1000 MG: 500 TABLET ORAL at 16:30

## 2018-10-12 RX ADMIN — LIDOCAINE 1 PATCH: 50 PATCH CUTANEOUS at 10:00

## 2018-10-12 RX ADMIN — INSULIN LISPRO 1 UNITS: 100 INJECTION, SOLUTION INTRAVENOUS; SUBCUTANEOUS at 16:30

## 2018-10-12 RX ADMIN — GLIMEPIRIDE 4 MG: 2 TABLET ORAL at 07:52

## 2018-10-12 RX ADMIN — INSULIN LISPRO 1 UNITS: 100 INJECTION, SOLUTION INTRAVENOUS; SUBCUTANEOUS at 22:09

## 2018-10-12 RX ADMIN — INSULIN LISPRO 1 UNITS: 100 INJECTION, SOLUTION INTRAVENOUS; SUBCUTANEOUS at 07:52

## 2018-10-12 RX ADMIN — PRAVASTATIN SODIUM 20 MG: 20 TABLET ORAL at 16:30

## 2018-10-12 RX ADMIN — METHOCARBAMOL 500 MG: 500 TABLET ORAL at 11:23

## 2018-10-12 RX ADMIN — METHOCARBAMOL 500 MG: 500 TABLET ORAL at 05:51

## 2018-10-12 RX ADMIN — METFORMIN HYDROCHLORIDE 1000 MG: 500 TABLET ORAL at 07:51

## 2018-10-12 NOTE — PROGRESS NOTES
Occupational Therapy Note     10/12/18 3887   Pain Assessment   Pain Assessment 0-10   Pain Score 3   Pain Type Acute pain   Pain Location Leg   Pain Orientation Right   Hospital Pain Intervention(s) Distraction;Repositioned; Environmental changes   Restrictions/Precautions   RLE Weight Bearing Per Order NWB   Lifestyle   Autonomy "I have a shower at home with a little lip- I will need a seat/bench"   QI: Putting On/Taking Off [address] [facility] Provided by [place] No physical assistance   Comment Leg cross technique for left LE to don sock and shoe   Putting On/Taking Off Footwear CARE Score 4   Dressing/Undressing Clothing   LB Dressing (FIM) 5 - [place] sets up supplies or applies device   QI: Lying to Sitting on Side of Bed   Assistance Needed Supervision   Assistance Provided by [place] No physical assistance   Lying to Sitting on Side of Bed CARE Score 4   QI: Sit to [address] Provided by [place] No physical assistance   Sit to Stand CARE Score 4   Bed Mobility   Sit to Supine 5  Supervision   Additional items Increased time required;Verbal cues   Additional Comments HOB flat, no hand rail   QI: Chair/Bed-to-Chair Transfer   Assistance Needed Supervision   Assistance Provided by [place] No physical assistance   Chair/Bed-to-Chair Transfer CARE Score 4   Transfer Bed/Chair/Wheelchair   Bed, Chair, Wheelchair Transfer (FIM) 5 - Patient requires supervision/monitoring   QI: Toilet Transfer   Assistance Needed Supervision; Adaptive equipment   Assistance Provided by [place] No physical assistance   Comment grab bar, stand pivot   Toilet Transfer CARE Score 4   Toilet Transfer   Toilet Transfer (FIM) 5 - Patient requires supervision/monitoring   Functional Standing Tolerance   Time 2:30, 2:00, 3:30, 4:15, 3:00   Activity card match task, ball to cone unilateral support   Therapeutic Excerise-Strength   UE Strength Yes   Exercise Tools   UE Ergometer 10 min   Other Exercise Tool 1 2# Tbar- x 30 Shoulder Flex to 90*, chest press, B circles, elbow flex   Additional Activities   Additional Activities Comments 2 x5 time sit to stand from [facility] to table top hand placement   Assessment   Treatment Assessment Pt seen for skilled OT services this date for functional mobility in WC, transfers from toilet, bed, WC, LB dressing, don and doff TLSO from [facility] with min cue x 1, standing tolerance  Pt requires S at this time for occassional cues with safety  Educated on inserting and removing leg rest, good carryover of locking breaks with approach to surfaces  Min A to swivel leg rest to side and remove   Tolerating session well, left LE fatigue with standing  Occupational performance remains limited in dynamic balance out of ERWIN  Pt to benefit from continued skilled OT services to progress with standing tolerance for CM and sinkside grooming and meet established treatment goals with overall decreased burden of care  Problem List Decreased strength;Decreased endurance;Decreased mobility; Decreased safety awareness;Pain   Plan   Treatment/Interventions ADL retraining;Functional transfer training; Therapeutic exercise;Patient/family training;Equipment eval/education; Bed mobility;OT   OT Therapy Minutes   OT Time In 0830   OT Time Out 1000   OT Total Time (minutes) 90   OT Mode of treatment - Individual (minutes) 90   OT Mode of treatment - Concurrent (minutes) 0   OT Mode of treatment - Group (minutes) 0   OT Mode of treatment - Co-treat (minutes) 0   OT Mode of Teatment - Total time(minutes) 90 minutes   Therapy Time missed   Time missed?  No
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
## 2018-10-12 NOTE — PROGRESS NOTES
Physical Therapy Progress Note   10/12/18 1400   Pain Assessment   Pain Assessment 0-10   Pain Score 3   Restrictions/Precautions   Precautions Bed/chair alarms; Fall Risk;Spinal precautions;Supervision on toilet/commode   Weight Bearing Restrictions Yes   RUE Weight Bearing Per Order WBAT   LUE Weight Bearing Per Order WBAT   RLE Weight Bearing Per Order NWB   LLE Weight Bearing Per Order WBAT   Cognition   Overall Cognitive Status [facility]   Arousal/Participation Alert; Cooperative   Attention Within functional limits   Orientation Level Oriented X4   Memory Within functional limits   Following Commands Follows multistep commands without difficulty   Subjective   Subjective reports 3/10 pain in R LE; no other c/o   QI: Roll Left and Right   Assistance Needed Supervision   Assistance Provided by Poth No physical assistance   Roll Left and Right CARE Score 4   QI: Sit to [address] Provided by Poth No physical assistance   Sit to Lying CARE Score 4   QI: Lying to Sitting on Side of Bed   Assistance Needed Supervision   Assistance Provided by Poth No physical assistance   Lying to Sitting on Side of Bed CARE Score 4   QI: Sit to [address] Provided by Poth No physical assistance   Sit to Stand CARE Score 4   Bed Mobility   Able to Roll Left to Right;Right to Left;Scoot Up   Findings S   QI: Chair/Bed-to-Chair Transfer   Assistance Needed Supervision   Assistance Provided by Poth No physical assistance   Chair/Bed-to-Chair Transfer CARE Score 4   Transfer Bed/Chair/Wheelchair   Limitations Noted In Balance; Endurance   Adaptive Equipment None   Sit Pivot Supervision   Stand Pivot Supervision   Sit to Stand Supervision   Stand to Sit Supervision   Supine to Sit Supervision   Sit to Supine Supervision   Car Transfer Supervision   Findings S   Bed, Chair, Wheelchair Transfer (FIM) 5 - Patient requires supervision/monitoring   QI: Car Transfer   Assistance Needed Supervision   Assistance Provided by Mercer No physical assistance   Car Transfer CARE Score 4   QI: [address] Provided by Mercer No physical assistance   Walk 10 Feet CARE Score 4   QI: Walk 50 Feet with Two Turns   Reason if not Attempted Safety concerns   Walk 50 Feet with Two Turns CARE Score 88   QI: Walk 150 Feet   Reason if not Attempted Safety concerns   Walk 150 Feet CARE Score 88   QI: Walking 10 Feet on Uneven Surfaces   Reason if not Attempted Safety concerns   Walking 10 Feet on Uneven Surfaces CARE Score 88   Ambulation   Does the patient walk? 2  Yes   Primary Discharge Mode of Locomotion Wheelchair   Walk Assist Level Supervision   Gait Pattern Hopping   Assist Device Roller Walker   Distance Walked (feet) 10 ft   Limitations Noted In Endurance;Balance   Findings recommend w/c level only; Walking (FIM) 1 - Patient ambulates less than 49 feet regardless of assist/device/set up   QI: Wheel 50 Feet with [address] Provided by Mercer No physical assistance   Wheel 50 Feet with Two Turns CARE Score 6   QI: [address] Provided by Mercer No physical assistance   Wheel 150 Feet CARE Score 6   Wheelchair mobility   QI: Does the patient use a wheelchair? 1  Yes   QI: Indicate the type of wheelchair 1  Manual   Wheelchair Assist Level Modified Independent   Method Right upper extremity; Left upper extremity   Needs Assist With Remove Leg Rest;Replace Leg Rest   Distance Level Surface (feet) 400 ft   Findings MI   Wheelchair (FIM) 6 - Patient wheels 150 feet or more, no rests AND independently, timely and safely   QI: 1 Step (Curb)   Reason if not Attempted Activity not applicable   1 Step (Curb) CARE Score 9   QI: 4 Steps   Reason if not Attempted Safety concerns   4 Steps CARE Score 88   QI: 12 Steps   Reason if not Attempted Safety concerns 12 Steps CARE Score 88   Stairs   Findings NT   QI: Picking Up Object   Reason if not Attempted Safety concerns   Picking Up Object CARE Score 88   QI: Toilet Transfer   Assistance Needed Supervision   Assistance Provided by Mcclellan No physical assistance   Toilet Transfer CARE Score 4   Toilet Transfer   Surface Assessed Raised Toilet   Toilet Transfer (FIM) 5 - Patient requires supervision/monitoring   Therapeutic Interventions   Strengthening seated TE (marches, LAQ, hip abd/add, ankle pumps)   Flexibility manual stretch B HS and gastroc;    Equipment Use   NuStep 15 minutes, level 3   Assessment   Treatment Assessment Pt cont to demonstrate ability to complete bed mobility S/MI (roll L/R and bridge/scoot) and xfer to/from all surfaces (bed, chair, wheelchair, toilet) with S/MI and sit pivot but cont to require safety cues and cueing/correction for hand placement and sequence; instructed in seated TE (as above) and NUStep x15 minutes, level 3; recommend cont PT POC; Family/Caregiver Present no   Problem List Decreased strength;Decreased range of motion;Decreased endurance; Impaired balance;Decreased mobility;Orthopedic restrictions   Barriers to Discharge Inaccessible home environment   PT Barriers   Physical Impairment Decreased strength;Decreased endurance;Decreased range of motion; Impaired balance;Decreased mobility; Decreased safety awareness;Orthopedic restrictions;Pain   Functional Limitation Ramp negotiation;Stair negotiation;Transfers; Wheelchair management   Plan   Treatment/Interventions ADL retraining;Functional transfer training;LE strengthening/ROM; Elevations; Therapeutic exercise; Endurance training;Cognitive reorientation;Patient/family training;Equipment eval/education; Bed mobility;Gait training   Progress Progressing toward goals   Recommendation   Recommendation Home PT; Home with family support; Outpatient PT   Equipment Recommended Wheelchair;Walker   PT Therapy Minutes   PT Time In 1400   PT Time Out 1530   PT Total Time (minutes) 90   PT Mode of treatment - Individual (minutes) 90   PT Mode of treatment - Concurrent (minutes) 0   PT Mode of treatment - Group (minutes) 0   PT Mode of treatment - Co-treat (minutes) 0   PT Mode of Teatment - Total time(minutes) 90 minutes   Therapy Time missed   Time missed?  No     Buren So, PTA
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
## 2018-10-13 LAB
GLUCOSE SERPL-MCNC: 103 MG/DL (ref 65–140)
GLUCOSE SERPL-MCNC: 141 MG/DL (ref 65–140)
GLUCOSE SERPL-MCNC: 170 MG/DL (ref 65–140)
GLUCOSE SERPL-MCNC: 173 MG/DL (ref 65–140)

## 2018-10-13 PROCEDURE — 97535 SELF CARE MNGMENT TRAINING: CPT

## 2018-10-13 PROCEDURE — 97127 HB COGNITIVE SKILLS DEVELOPMENT, EACH 15 MUNUTES: CPT

## 2018-10-13 PROCEDURE — 82948 REAGENT STRIP/BLOOD GLUCOSE: CPT

## 2018-10-13 PROCEDURE — 97110 THERAPEUTIC EXERCISES: CPT

## 2018-10-13 PROCEDURE — G0515 COGNITIVE SKILLS DEVELOPMENT: HCPCS

## 2018-10-13 PROCEDURE — 97530 THERAPEUTIC ACTIVITIES: CPT

## 2018-10-13 PROCEDURE — 97542 WHEELCHAIR MNGMENT TRAINING: CPT

## 2018-10-13 RX ADMIN — LISINOPRIL 10 MG: 10 TABLET ORAL at 08:31

## 2018-10-13 RX ADMIN — LIDOCAINE 1 PATCH: 50 PATCH CUTANEOUS at 08:31

## 2018-10-13 RX ADMIN — PRAVASTATIN SODIUM 20 MG: 20 TABLET ORAL at 17:06

## 2018-10-13 RX ADMIN — METFORMIN HYDROCHLORIDE 1000 MG: 500 TABLET ORAL at 17:06

## 2018-10-13 RX ADMIN — METFORMIN HYDROCHLORIDE 1000 MG: 500 TABLET ORAL at 08:30

## 2018-10-13 RX ADMIN — INSULIN LISPRO 1 UNITS: 100 INJECTION, SOLUTION INTRAVENOUS; SUBCUTANEOUS at 11:48

## 2018-10-13 RX ADMIN — GLIMEPIRIDE 4 MG: 2 TABLET ORAL at 08:33

## 2018-10-13 RX ADMIN — ENOXAPARIN SODIUM 40 MG: 40 INJECTION SUBCUTANEOUS at 08:30

## 2018-10-13 RX ADMIN — INSULIN LISPRO 1 UNITS: 100 INJECTION, SOLUTION INTRAVENOUS; SUBCUTANEOUS at 08:31
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
## 2018-10-13 NOTE — PROGRESS NOTES
10/13/18 1100   Pain Assessment   Pain Assessment No/denies pain   Pain Score No Pain   Restrictions/Precautions   Precautions Bed/chair alarms; Fall Risk;Cognitive;Spinal precautions   Braces or Orthoses TLSO   QI: Lower Body Dressing   Assistance Needed Supervision   Assistance Provided by [place] No physical assistance   Lower Body Dressing CARE Score 4   Dressing/Undressing Clothing   LB Dressing (FIM) 5 - Patient requires verbal cues   QI: Sit to Stand   Assistance Needed Supervision   Assistance Provided by [place] No physical assistance   Sit to Stand CARE Score 4   QI: Chair/Bed-to-Chair Transfer   Assistance Needed Supervision   Assistance Provided by [place] No physical assistance   Comment RW   Chair/Bed-to-Chair Transfer CARE Score 4   Transfer Bed/Chair/Wheelchair   Stand Pivot Supervision   Sit to Stand Supervision   Stand to Sit Supervision   Bed, Chair, Wheelchair Transfer (FIM) 5 - Patient requires supervision/monitoring   Cognition   Overall Cognitive Status [facility]   Arousal/Participation Alert; Cooperative   Attention Within functional limits   Orientation Level Oriented X4   Memory (fair recall of precautions today)   Following Commands Follows multistep commands with increased time or repetition   Activity Tolerance   Activity Tolerance Patient tolerated treatment well   Assessment   Treatment Assessment Pt engaged in 30 minute OT sessionw [facility] focus on family training with dtr  Dtr educated on showering restrictions until cleared by surgeon  Dtr also educated on restrictions for no standing when TLSO brace is doffed in shower  Dtr in understanding that TLSo must be donned/doffed while seated in shower  Pt able to demonstrate good carryover of donnign TLSO brace today with no need for verbal cues  Pt even able to adjust tension on straps with good recall of procedure  Pt maintained good standing balacne with NWb of RLE with unialteral UE release to pull patns up over hips   Pt and dtr educated to bathe by sink in laundry room in basement due to pt needing to sit on toilet and reach forward to sink in bathroom  Due to back precautions and risk for spilling water on floor, recommended to use sink in laundry room to wash  Educated dtr on benefits of using reacher for d/c if pt drops things and cannot pick them up  Dtr given handout to purchase shower seat and reacher independently  Pt will need RW, w/c and commode for d/c  Dtr shown how to fix ACE wrap if bandage comes undone when pt d/c home  Dtr in [facility]  Pt will d/c home at w/c level with stand pivot to w/c and use of RW for sit to stands for clothing management  OT Family training done with: dtr   Assessment of family training Daughter demonstrates good understnading and carryover of recommendations for d/c  Dtr reports no concern for d/c home and does not need any further family training  Problem List Decreased strength;Decreased endurance; Impaired balance;Decreased mobility   Plan   Treatment/Interventions ADL retraining;LE strengthening/ROM; Functional transfer training; Endurance training; Compensatory technique education   Progress Progressing toward goals   Recommendation   OT Discharge Recommendation Home with family support   OT Therapy Minutes   OT Time In 1100   OT Time Out 1130   OT Total Time (minutes) 30   OT Mode of treatment - Individual (minutes) 30   OT Mode of treatment - Concurrent (minutes) 0   OT Mode of treatment - Group (minutes) 0   OT Mode of treatment - Co-treat (minutes) 0   OT Mode of Teatment - Total time(minutes) 30 minutes   Therapy Time missed   Time missed?  No

## 2018-10-13 NOTE — PROGRESS NOTES
10/13/18 1230   Pain Assessment   Pain Assessment No/denies pain   Restrictions/Precautions   Precautions Bed/chair alarms; Fall Risk;Spinal precautions;Supervision on toilet/commode   RLE Weight Bearing Per Order NWB   Braces or Orthoses TLSO   Memory Skills   Memory (FIM) 5 - Recalls/performs request 90% of time   Social Interaction (FIM) 6 - Interacts appropriately with others BUT requires extra  time   Speech/Language/Cognition Assessmetn   Treatment Assessment Focus of session pertained in regards to education to pt's dtr about overall cognitive linguistic skills  Reviewed memory packet information w/ pt again, as well as w/ dtr educating on routines, keeping lists, making sure items are in one place to increase accuracy of recalling where item would be located, etc  All items in memory packet handout was reviewed and given to dtr, for anticipation of pt's d/c home on 10/15  Pt engaging in recalling daily events from yesterday and today w/o difficulty or need for increased cues  Educated both pt and dtr in regards to ongoing improvment w/ brain injury/recovery and aware of recommending OP SLP services to maximize skills when able to do OP therapies for PT  All questions answered from pt or pt's dtr at this time  SLP Therapy Minutes   SLP Time In 6293   SLP Time Out 1300   SLP Total Time (minutes) 30   SLP Mode of treatment - Individual (minutes) 30   SLP Mode of treatment - Concurrent (minutes) 0   SLP Mode of treatment - Group (minutes) 0   SLP Mode of treatment - Co-treat (minutes) 0   SLP Mode of Teatment - Total time(minutes) 30 minutes   Therapy Time missed   Time missed?  No   Daily FIM Score   Problem solving (FIM) 5 - Solves basic problems 90% of time   Comprehension (FIM) 6 - Has only MILD difficulty with complex/abstract info   Expression (FIM) 6 - Has only MILD difficulty with complex/abstract info

## 2018-10-13 NOTE — PROGRESS NOTES
Physical Therapy Progress Note   10/13/18 1000   Pain Assessment   Pain Assessment No/denies pain   Pain Score No Pain   Restrictions/Precautions   Weight Bearing Restrictions Yes   RUE Weight Bearing Per Order WBAT   LUE Weight Bearing Per Order WBAT   RLE Weight Bearing Per Order NWB   LLE Weight Bearing Per Order WBAT   Cognition   Overall Cognitive Status WFL   Arousal/Participation Alert; Cooperative   Attention Within functional limits   Orientation Level Oriented X4   Memory Within functional limits   Following Commands Follows multistep commands without difficulty   Subjective   Subjective denies pain; no c/o   QI: Roll Left and Right   Assistance Needed Supervision   Assistance Provided by [place] No physical assistance   Roll Left and Right CARE Score 4   QI: Sit to [address] Provided by [place] No physical assistance   Sit to Lying CARE Score 4   QI: Lying to Sitting on Side of Bed   Assistance Needed Supervision   Assistance Provided by [place] No physical assistance   Lying to Sitting on Side of Bed CARE Score 4   QI: Sit to [address] Provided by [place] No physical assistance   Sit to Stand CARE Score 4   Bed Mobility   Able to Roll Left to Right;Right to Left;Scoot Up   Findings S   QI: Chair/Bed-to-Chair Transfer   Assistance Needed Supervision   Assistance Provided by [place] No physical assistance   Chair/Bed-to-Chair Transfer CARE Score 4   Transfer Bed/Chair/Wheelchair   Limitations Noted In Balance; Endurance   Adaptive Equipment None   Sit Pivot Supervision   Stand Pivot Supervision   Sit to Stand Supervision   Stand to Sit Supervision   Supine to Sit Supervision   Sit to Supine Supervision   Car Transfer Supervision   All Transfer Supervision   Findings S   Bed, Chair, Wheelchair Transfer (FIM) 5 - Patient requires supervision/monitoring   QI: Car Transfer   Assistance Needed Supervision   Assistance Provided by [place] No physical assistance   Car Transfer CARE Score 4   QI: [address] Provided by [place] No physical assistance   Walk 10 Feet CARE Score 4   QI: Walk 50 Feet with Two Turns   Reason if not Attempted Activity not applicable   Walk 50 Feet with Two Turns CARE Score 9   QI: Walk 150 Feet   Reason if not Attempted Activity not applicable   Walk 258 Feet CARE Score 9   QI: Walking 10 Feet on Uneven Surfaces   Reason if not Attempted Activity not applicable   Walking 10 Feet on Uneven Surfaces CARE Score 9   Ambulation   Does the patient walk? 2  Yes   Primary Discharge Mode of Locomotion Wheelchair   Findings NT   Walking (FIM) 0 - Activity does not occur   QI: Wheel 50 Feet with [id number] Provided by [place] No physical assistance   Wheel 50 Feet with Two Turns CARE Score 6   QI: [facility] Provided by [place] No physical assistance   Wheel 150 Feet CARE Score 6   Wheelchair mobility   QI: Does the patient use a wheelchair? 1  Yes   QI: Indicate the type of wheelchair 1  Manual   Wheelchair Assist Level Modified Independent   Method Right upper extremity; Left upper extremity   Needs Assist With Remove Leg Rest;Replace Leg Rest   Distance Level Surface (feet) 400 ft   Findings MI    Wheelchair (FIM) 6 - Patient wheels 150 feet or more, no rests AND independently, timely and safely   QI: 1 Step (Curb)   Reason if not Attempted Activity not applicable   1 Step (Curb) CARE Score 9   QI: 4 Steps   Reason if not Attempted Activity not applicable   4 Steps CARE Score 9   QI: 12 Steps   Reason if not Attempted Activity not applicable   12 Steps CARE Score 9   Stairs   Type Stairs   # of Steps 3   Weight Bearing Precautions RLE;NWB   Assist Devices Other  (passive transport bumping w/c up/down)   Findings instructed dtr in bumping w/c up/down stairs x3 this session; dtr able to demonstrate method x2 after   Stairs (FIM) 1 - Patient completes less than 25% of all tasks   QI: Picking Up Object   Reason if not Attempted Safety concerns   Picking Up Object CARE Score 88   QI: Toilet Transfer   Assistance Needed Supervision   Assistance Provided by [place] No physical assistance   Toilet Transfer CARE Score 4   Toilet Transfer   Surface Assessed Raised Toilet   Limitations Noted In UE Strength;LE Strength   Adaptive Equipment Grab Bar   Findings Ax2   Toilet Transfer (FIM) 5 - Patient requires supervision/monitoring   Therapeutic Interventions   Strengthening seated TE (marches, LAQ, hip abd/add)   Equipment Use   NuStep 15 minutes, level 3   Assessment   Treatment Assessment Pt supine in bed prior to session; able to complete bed mobility (roll L/R and bridge/scoot) with S/MI; xfers sit pivot/stand pivot while maintaining NWB status on R LE; instructed pt's daughter in bumping w/c up/down stairs as well as how to fold w/c; place foot rests on/off; etc; instructed pt in seated TE (as above); NuStep x15 minutes, level 3; finished session with MF, OT; recommend cont PT POC; Family/Caregiver Present no   Problem List Decreased strength;Decreased range of motion;Decreased endurance; Impaired balance;Decreased mobility;Orthopedic restrictions   Barriers to Discharge Inaccessible home environment   PT Barriers   Physical Impairment Decreased strength;Decreased endurance;Decreased range of motion; Impaired balance;Decreased mobility; Decreased safety awareness;Orthopedic restrictions;Pain   Functional Limitation Ramp negotiation;Stair negotiation;Transfers; Wheelchair management   Plan   Treatment/Interventions ADL retraining;Functional transfer training;LE strengthening/ROM; Elevations; Therapeutic exercise; Endurance training;Cognitive reorientation;Patient/family training;Equipment eval/education; Bed mobility;Gait training   Progress Progressing toward goals   Recommendation   Recommendation Home PT; Home with family support; Outpatient PT   Equipment Recommended Wheelchair   PT Therapy Minutes   PT Time In 1000   PT Time Out 1100   PT Total Time (minutes) 60   PT Mode of treatment - Individual (minutes) 60   PT Mode of treatment - Concurrent (minutes) 0   PT Mode of treatment - Group (minutes) 0   PT Mode of treatment - Co-treat (minutes) 0   PT Mode of Teatment - Total time(minutes) 60 minutes   Therapy Time missed   Time missed?  No     Unknown Katty, PTA

## 2018-10-14 ENCOUNTER — APPOINTMENT (INPATIENT)
Dept: RADIOLOGY | Facility: HOSPITAL | Age: 58
DRG: 949 | End: 2018-10-14
Payer: COMMERCIAL

## 2018-10-14 LAB
ANION GAP SERPL CALCULATED.3IONS-SCNC: 6 MMOL/L (ref 4–13)
BASOPHILS # BLD AUTO: 0.06 THOUSANDS/ΜL (ref 0–0.1)
BASOPHILS NFR BLD AUTO: 1 % (ref 0–1)
BUN SERPL-MCNC: 12 MG/DL (ref 5–25)
CALCIUM SERPL-MCNC: 9 MG/DL (ref 8.3–10.1)
CHLORIDE SERPL-SCNC: 103 MMOL/L (ref 100–108)
CO2 SERPL-SCNC: 27 MMOL/L (ref 21–32)
CREAT SERPL-MCNC: 0.48 MG/DL (ref 0.6–1.3)
EOSINOPHIL # BLD AUTO: 0.12 THOUSAND/ΜL (ref 0–0.61)
EOSINOPHIL NFR BLD AUTO: 2 % (ref 0–6)
ERYTHROCYTE [DISTWIDTH] IN BLOOD BY AUTOMATED COUNT: 13.9 % (ref 11.6–15.1)
GFR SERPL CREATININE-BSD FRML MDRD: 109 ML/MIN/1.73SQ M
GLUCOSE SERPL-MCNC: 109 MG/DL (ref 65–140)
GLUCOSE SERPL-MCNC: 144 MG/DL (ref 65–140)
GLUCOSE SERPL-MCNC: 147 MG/DL (ref 65–140)
GLUCOSE SERPL-MCNC: 147 MG/DL (ref 65–140)
GLUCOSE SERPL-MCNC: 208 MG/DL (ref 65–140)
HCT VFR BLD AUTO: 30.3 % (ref 34.8–46.1)
HGB BLD-MCNC: 9.5 G/DL (ref 11.5–15.4)
IMM GRANULOCYTES # BLD AUTO: 0.01 THOUSAND/UL (ref 0–0.2)
IMM GRANULOCYTES NFR BLD AUTO: 0 % (ref 0–2)
LYMPHOCYTES # BLD AUTO: 2.36 THOUSANDS/ΜL (ref 0.6–4.47)
LYMPHOCYTES NFR BLD AUTO: 38 % (ref 14–44)
MCH RBC QN AUTO: 27.7 PG (ref 26.8–34.3)
MCHC RBC AUTO-ENTMCNC: 31.4 G/DL (ref 31.4–37.4)
MCV RBC AUTO: 88 FL (ref 82–98)
MONOCYTES # BLD AUTO: 0.35 THOUSAND/ΜL (ref 0.17–1.22)
MONOCYTES NFR BLD AUTO: 6 % (ref 4–12)
NEUTROPHILS # BLD AUTO: 3.25 THOUSANDS/ΜL (ref 1.85–7.62)
NEUTS SEG NFR BLD AUTO: 53 % (ref 43–75)
NRBC BLD AUTO-RTO: 0 /100 WBCS
PLATELET # BLD AUTO: 533 THOUSANDS/UL (ref 149–390)
PMV BLD AUTO: 9.6 FL (ref 8.9–12.7)
POTASSIUM SERPL-SCNC: 4 MMOL/L (ref 3.5–5.3)
RBC # BLD AUTO: 3.43 MILLION/UL (ref 3.81–5.12)
SODIUM SERPL-SCNC: 136 MMOL/L (ref 136–145)
WBC # BLD AUTO: 6.15 THOUSAND/UL (ref 4.31–10.16)

## 2018-10-14 PROCEDURE — G0515 COGNITIVE SKILLS DEVELOPMENT: HCPCS

## 2018-10-14 PROCEDURE — 97110 THERAPEUTIC EXERCISES: CPT

## 2018-10-14 PROCEDURE — 97535 SELF CARE MNGMENT TRAINING: CPT

## 2018-10-14 PROCEDURE — 80048 BASIC METABOLIC PNL TOTAL CA: CPT | Performed by: PHYSICIAN ASSISTANT

## 2018-10-14 PROCEDURE — 97127 HB COGNITIVE SKILLS DEVELOPMENT, EACH 15 MUNUTES: CPT

## 2018-10-14 PROCEDURE — 70450 CT HEAD/BRAIN W/O DYE: CPT

## 2018-10-14 PROCEDURE — 82948 REAGENT STRIP/BLOOD GLUCOSE: CPT

## 2018-10-14 PROCEDURE — 85025 COMPLETE CBC W/AUTO DIFF WBC: CPT | Performed by: PHYSICIAN ASSISTANT

## 2018-10-14 RX ADMIN — INSULIN LISPRO 2 UNITS: 100 INJECTION, SOLUTION INTRAVENOUS; SUBCUTANEOUS at 11:28

## 2018-10-14 RX ADMIN — PRAVASTATIN SODIUM 20 MG: 20 TABLET ORAL at 16:55

## 2018-10-14 RX ADMIN — ENOXAPARIN SODIUM 40 MG: 40 INJECTION SUBCUTANEOUS at 11:11

## 2018-10-14 RX ADMIN — METFORMIN HYDROCHLORIDE 1000 MG: 500 TABLET ORAL at 16:56

## 2018-10-14 RX ADMIN — METFORMIN HYDROCHLORIDE 1000 MG: 500 TABLET ORAL at 07:57

## 2018-10-14 RX ADMIN — LIDOCAINE 1 PATCH: 50 PATCH CUTANEOUS at 07:58

## 2018-10-14 RX ADMIN — LISINOPRIL 10 MG: 10 TABLET ORAL at 07:56

## 2018-10-14 RX ADMIN — GLIMEPIRIDE 4 MG: 2 TABLET ORAL at 07:57

## 2018-10-14 NOTE — PROGRESS NOTES
Physical Therapy Progress Note   10/14/18 1300   Pain Assessment   Pain Assessment No/denies pain   Pain Score No Pain   Restrictions/Precautions   Precautions Bed/chair alarms; Fall Risk   Weight Bearing Restrictions Yes   RUE Weight Bearing Per Order WBAT   LUE Weight Bearing Per Order WBAT   RLE Weight Bearing Per Order NWB   LLE Weight Bearing Per Order WBAT   Braces or Orthoses TLSO   Cognition   Overall Cognitive Status WFL   Arousal/Participation Alert; Cooperative   Attention Within functional limits   Orientation Level Oriented X4   Memory Within functional limits   Following Commands Follows multistep commands without difficulty   Subjective   Subjective denies pain; no c/o   QI: Roll Left and Right   Assistance Needed Supervision   Assistance Provided by [place] No physical assistance   Roll Left and Right CARE Score 4   QI: Sit to [address] Provided by [place] No physical assistance   Sit to Lying CARE Score 4   QI: Lying to Sitting on Side of Bed   Assistance Needed Supervision   Assistance Provided by [place] No physical assistance   Lying to Sitting on Side of Bed CARE Score 4   QI: Sit to [address] Provided by [place] No physical assistance   Sit to Stand CARE Score 4   Bed Mobility   Able to Roll Left to Right;Right to Left;Scoot Up   Findings S   QI: Chair/Bed-to-Chair Transfer   Assistance Needed Supervision   Assistance Provided by [place] No physical assistance   Chair/Bed-to-Chair Transfer CARE Score 4   Transfer Bed/Chair/Wheelchair   Limitations Noted In Balance; Endurance   Adaptive Equipment None   Sit Pivot Supervision   Stand Pivot Supervision   Sit to Stand Supervision   Stand to Sit Supervision   Supine to Sit Supervision   Sit to Supine Supervision   Car Transfer Supervision   All Transfer Supervision   Findings S   Bed, Chair, Wheelchair Transfer (FIM) 5 - Patient requires supervision/monitoring   QI: Car Transfer   Assistance Needed Supervision   Assistance Provided by [place] No physical assistance   Car Transfer CARE Score 4   QI: [address] Provided by [place] No physical assistance   Walk 10 Feet CARE Score 4   QI: Walk 50 Feet with Two Turns   Reason if not Attempted Activity not applicable   Walk 50 Feet with Two Turns CARE Score 9   QI: Walk 150 Feet   Reason if not Attempted Activity not applicable   Walk 639 Feet CARE Score 9   QI: Walking 10 Feet on Uneven Surfaces   Reason if not Attempted Activity not applicable   Walking 10 Feet on Uneven Surfaces CARE Score 9   Ambulation   Does the patient walk? 2  Yes   Primary Discharge Mode of Locomotion Wheelchair   Findings NA   Walking (FIM) 0 - Activity does not occur   QI: Wheel 50 Feet with [address] Provided by [place] No physical assistance   Wheel 50 Feet with Two Turns CARE Score 6   QI: Wheel 150 [address] Provided by [place] No physical assistance   Wheel 150 Feet CARE Score 6   Wheelchair mobility   QI: Does the patient use a wheelchair? 1  Yes   QI: Indicate the type of wheelchair 1  Manual   Wheelchair Assist Level Modified Independent   Method Right upper extremity; Left upper extremity   Needs Assist With Remove Leg Rest;Replace Leg Rest   Distance Level Surface (feet) 400 ft   Findings MI   Wheelchair (FIM) 6 - Patient wheels 150 feet or more, no rests AND independently, timely and safely   QI: 1 Step (Curb)   Reason if not Attempted Activity not applicable   1 Step (Curb) CARE Score 9   QI: 4 Steps   Reason if not Attempted Activity not applicable   4 Steps CARE Score 9   QI: 12 Steps   Reason if not Attempted Activity not applicable   12 Steps CARE Score 9   Stairs   Type Stairs   # of Steps 3   Weight Bearing Precautions RLE;NWB   Assist Devices Other  (family training, family instructed on dependent entry/exit ) Findings bump up/down totalA in w/c    Stairs (FIM) 1 - Patient requires assist of two people   QI: Picking Up Object   Reason if not Attempted Safety concerns   Picking Up Object CARE Score 88   QI: Toilet Transfer   Assistance Needed Supervision   Assistance Provided by [place] No physical assistance   Toilet Transfer CARE Score 4   Toilet Transfer   Surface Assessed Raised Toilet   Limitations Noted In UE Strength;LE Strength   Adaptive Equipment Grab Bar   Findings Ax2   Toilet Transfer (FIM) 5 - Patient requires supervision/monitoring   Equipment Use   NuStep 15 minutes, level 3   Assessment   Treatment Assessment Pt seated in bedside recliner prior to session; able to xfer to/from with sit pivot and S; maintains WBS R LE and does not require physical assist; w/c propel with MI; NuStep x15 minutes, level 3; finished session in bedside recliner with alarms active and all needs in reach; recommend cont PT POC; Family/Caregiver Present no   Problem List Decreased strength;Decreased range of motion;Decreased endurance; Impaired balance;Decreased mobility;Orthopedic restrictions   Barriers to Discharge Inaccessible home environment   PT Barriers   Physical Impairment Decreased strength;Decreased endurance;Decreased range of motion; Impaired balance;Decreased mobility; Decreased safety awareness;Orthopedic restrictions;Pain   Functional Limitation Ramp negotiation;Stair negotiation;Transfers; Wheelchair management   Plan   Treatment/Interventions ADL retraining;Functional transfer training;LE strengthening/ROM; Elevations; Therapeutic exercise; Endurance training;Cognitive reorientation;Patient/family training;Equipment eval/education; Bed mobility;Gait training   Progress Progressing toward goals   Recommendation   Recommendation Home PT; Home with family support; Outpatient PT   Equipment Recommended Wheelchair   PT Therapy Minutes   PT Time In 1300   PT Time Out 1330   PT Total Time (minutes) 30   PT Mode of treatment - Individual (minutes) 30   PT Mode of treatment - Concurrent (minutes) 0   PT Mode of treatment - Group (minutes) 0   PT Mode of treatment - Co-treat (minutes) 0   PT Mode of Teatment - Total time(minutes) 30 minutes   Therapy Time missed   Time missed?  No     Yajaira Pina, PTA
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
## 2018-10-14 NOTE — PROGRESS NOTES
10/14/18 0930   Pain Assessment   Pain Assessment No/denies pain   Restrictions/Precautions   Precautions Bed/chair alarms; Fall Risk   RLE Weight Bearing Per Order NWB   Braces or Orthoses TLSO   Memory Skills   Memory (FIM) 5 - [place] cues patient   Social Interaction (FIM) 6 - Interacts appropriately with others BUT requires extra  time   Speech/Language/Cognition Assessmetn   Treatment Assessment Session engaging in [facility] daily review of events from yesterday, where recall of therapies and family training [facility], no increased cues needed  When asking about additional visitors yesterday, pt recalling w/o cues  When asking about last meal (breakfast), pt able to recall w/o difficulty  Engaged in [facility], given 15 picture pairs  Ability to ID items in the pictures was [facility]  Recalling objective of task was supervision level (turning over 2 pictures at a time, turn taking, etc)  Pt's ability to recall and match pairs appropriately was 9/15 accurate, where pt was able to assist SLP to match some pairs and vice versa  Overall, pt's STM recall of new information continues to improve  SLP Therapy Minutes   SLP Time In 0930   SLP Time Out 1000   SLP Total Time (minutes) 30   SLP Mode of treatment - Individual (minutes) 30   SLP Mode of treatment - Concurrent (minutes) 0   SLP Mode of treatment - Group (minutes) 0   SLP Mode of treatment - Co-treat (minutes) 0   SLP Mode of Teatment - Total time(minutes) 30 minutes   Therapy Time missed   Time missed?  No   Daily FIM Score   Problem solving (FIM) 5 - Solves basic problems 90% of time   Comprehension (FIM) 6 - Has only MILD difficulty with complex/abstract info   Expression (FIM) 6 - Has only MILD difficulty with complex/abstract info

## 2018-10-14 NOTE — PROGRESS NOTES
10/14/18 7994   Pain Assessment   Pain Assessment No/denies pain   Pain Score No Pain   Restrictions/Precautions   Precautions Bed/chair alarms; Fall Risk;Spinal precautions;Supervision on toilet/commode   Weight Bearing Restrictions Yes   RLE Weight Bearing Per Order NWB   Braces or Orthoses TLSO   QI: Sit to Stand   Assistance Needed Supervision   Assistance Provided by [place] No physical assistance   Comment RW   Sit to Stand CARE Score 4   QI: Chair/Bed-to-Chair Transfer   Assistance Needed Supervision   Assistance Provided by [place] No physical assistance   Comment SPT no AD   Chair/Bed-to-Chair Transfer CARE Score 4   Transfer Bed/Chair/Wheelchair   Positioning Concerns Cognition   Limitations Noted In Balance;Confidence; Endurance;Problem Solving   Adaptive Equipment None   Stand Pivot Supervision   Sit to Stand Supervision   Stand to Sit Supervision   Findings Pt engaged in repetitive transfer training  Pt cont to require VC for safe set up of transfers and inc time to problem solve safe set up  Bed, Chair, Wheelchair Transfer (FIM) 5 - Patient requires supervision/monitoring   Toilet Transfer   Findings Pt engaged in practice toilet transfer and toileting hygiene  Pt does not have GB at home and will have to utilize a commode for safe transfers  Pt requires RW while in stance for unilateral UE support during CM over hips, pt noted w/ inc carryover for safety during CM  Pt cont to require VC for safe set up of w/c<>commode transfers, however pt able to complete w/ inc time to problem solve safe set up  OT cont to recommend pt have DTR present to A for safety during transfers at home  Kitchen Mobility   Kitchen-Mobility Level Wheelchair   Kitchen Activity Retrieve items;Transport items   Kitchen Mobility Comments Pt cont to require VC for safety and to maintain spinal precautions to retrieve items from overhead cabinets, refrigerator, and drawers   Pt able to lock w/c brakes and utilizes counter to stand and reach into overhead cabinets w/ no LOB noted  Pt noted w/ improved safety awareness during kitchen mobility, however OT recommend pt have frequently used items placed in easy to reach areas of kitchen at home, pt receptive to feedback  Exercise Tools   Exercise Tools Yes   UE Ergometer Pt engaged in 10 minutes on arm bike w/ focus on pt endurance to inc pt indep w/ fxnl w/c mobility, ADLs, IADLs and all fxnl transfers due to pt noted w/ fatigue t/o Tx session  Pt tolerated exercise w/ no rest break required  Cognition   Overall Cognitive Status WFL   Arousal/Participation Alert; Cooperative   Attention Within functional limits   Orientation Level Oriented X4   Memory Decreased recall of precautions   Following Commands Follows multistep commands with increased time or repetition   Comments Pt able to recall 3/3 spinal precautions this session  Activity Tolerance   Activity Tolerance Patient tolerated treatment well   Assessment   Treatment Assessment Pt participated in skilled OT Tx session w/ focus on repetitive transfer training, safety awareness, kitchen mobility, endurance, and ordering commode, w/c, and RW for pt safe D/C home  See above note for activity detail  Cont OT POC to maximize pt indep w/ ADLs/IADLS and all fxnl transfers for safe D/C home  Pt reports no concerns/questions for D/C home at this time  Prognosis Good   Problem List Decreased strength;Decreased endurance; Impaired balance;Decreased mobility; Decreased safety awareness;Orthopedic restrictions   Barriers to Discharge Inaccessible home environment   Plan   Treatment/Interventions ADL retraining;Functional transfer training;LE strengthening/ROM; Therapeutic exercise; Endurance training;Cognitive reorientation;Patient/family training;Equipment eval/education; Bed mobility; Compensatory technique education   Progress Progressing toward goals   Recommendation   OT Discharge Recommendation Home with family support   Equipment Recommended Bedside commode   OT Equipment ordered yes   Date ordered 10/14/18   OT - OK to Discharge Yes   OT Therapy Minutes   OT Time In 0830   OT Time Out 0930   OT Total Time (minutes) 60   OT Mode of treatment - Individual (minutes) 60   OT Mode of treatment - Concurrent (minutes) 0   OT Mode of treatment - Group (minutes) 0   OT Mode of treatment - Co-treat (minutes) 0   OT Mode of Teatment - Total time(minutes) 60 minutes   Therapy Time missed   Time missed?  No

## 2018-10-15 ENCOUNTER — TELEPHONE (OUTPATIENT)
Dept: NEUROLOGY | Facility: CLINIC | Age: 58
End: 2018-10-15

## 2018-10-15 VITALS
SYSTOLIC BLOOD PRESSURE: 110 MMHG | HEART RATE: 78 BPM | RESPIRATION RATE: 18 BRPM | DIASTOLIC BLOOD PRESSURE: 65 MMHG | TEMPERATURE: 98.2 F | HEIGHT: 63 IN | OXYGEN SATURATION: 98 % | WEIGHT: 173.94 LBS | BODY MASS INDEX: 30.82 KG/M2

## 2018-10-15 LAB
GLUCOSE SERPL-MCNC: 147 MG/DL (ref 65–140)
GLUCOSE SERPL-MCNC: 195 MG/DL (ref 65–140)

## 2018-10-15 PROCEDURE — 82948 REAGENT STRIP/BLOOD GLUCOSE: CPT

## 2018-10-15 PROCEDURE — 99239 HOSP IP/OBS DSCHRG MGMT >30: CPT | Performed by: PHYSICAL MEDICINE & REHABILITATION

## 2018-10-15 PROCEDURE — 97535 SELF CARE MNGMENT TRAINING: CPT

## 2018-10-15 RX ORDER — SIMVASTATIN 10 MG
10 TABLET ORAL
Qty: 30 TABLET | Refills: 3 | Status: SHIPPED | OUTPATIENT
Start: 2018-10-15 | End: 2018-10-26 | Stop reason: SDUPTHER

## 2018-10-15 RX ORDER — ASPIRIN 325 MG
325 TABLET, DELAYED RELEASE (ENTERIC COATED) ORAL 2 TIMES DAILY
Qty: 20 TABLET | Refills: 0 | Status: SHIPPED | OUTPATIENT
Start: 2018-10-09 | End: 2018-12-06

## 2018-10-15 RX ORDER — LISINOPRIL 10 MG/1
10 TABLET ORAL DAILY
Qty: 30 TABLET | Refills: 3 | Status: SHIPPED | OUTPATIENT
Start: 2018-10-16 | End: 2018-10-26 | Stop reason: SDUPTHER

## 2018-10-15 RX ORDER — GLIMEPIRIDE 4 MG/1
4 TABLET ORAL
Qty: 30 TABLET | Refills: 3 | Status: SHIPPED | OUTPATIENT
Start: 2018-10-16 | End: 2018-10-26 | Stop reason: SDUPTHER

## 2018-10-15 RX ORDER — PRAVASTATIN SODIUM 20 MG
20 TABLET ORAL
Qty: 30 TABLET | Refills: 3 | Status: SHIPPED | OUTPATIENT
Start: 2018-10-15 | End: 2018-10-15 | Stop reason: HOSPADM

## 2018-10-15 RX ADMIN — METFORMIN HYDROCHLORIDE 1000 MG: 500 TABLET ORAL at 08:15

## 2018-10-15 RX ADMIN — ENOXAPARIN SODIUM 40 MG: 40 INJECTION SUBCUTANEOUS at 08:16

## 2018-10-15 RX ADMIN — LIDOCAINE 1 PATCH: 50 PATCH CUTANEOUS at 08:16

## 2018-10-15 RX ADMIN — GLIMEPIRIDE 4 MG: 2 TABLET ORAL at 08:16

## 2018-10-15 RX ADMIN — INSULIN LISPRO 2 UNITS: 100 INJECTION, SOLUTION INTRAVENOUS; SUBCUTANEOUS at 11:12

## 2018-10-15 RX ADMIN — LISINOPRIL 10 MG: 10 TABLET ORAL at 08:16

## 2018-10-15 NOTE — PLAN OF CARE
Discharge to home or other facility with appropriate resources Completed      Absence or prevention of progression during hospitalization Completed      Nutrient/Hydration intake appropriate for improving, restoring or maintaining nutritional needs Completed      Verbalizes/displays adequate comfort level or baseline comfort level Completed      Patient will remain free of falls Completed      Skin integrity is maintained or improved Completed      Maintain or return to baseline ADL function Completed      Maintain or return mobility status to optimal level Completed

## 2018-10-15 NOTE — PROGRESS NOTES
10/15/18 0700   Pain Assessment   Pain Assessment No/denies pain   Pain Score No Pain   Restrictions/Precautions   Precautions Bed/chair alarms; Fall Risk   Weight Bearing Restrictions Yes   RLE Weight Bearing Per Order NWB   Braces or Orthoses TLSO   QI: Eating   Assistance Needed Independent   Assistance Provided by [place] No physical assistance   Eating CARE Score 6   Eating Assessment   Meal Assessed Breakfast   QI: Swallowing/Nutritional Status Regular food   Intake Mode PO;Self   Opens Packages Yes   Eating (FIM) 7 - Patient completely independent   QI: Oral Hygiene   Assistance Needed Supervision   Assistance Provided by [place] No physical assistance   Comment in stance   Oral Hygiene CARE Score 4   Grooming   Able To Initiate Tasks; Acquire Items; Wash/Dry Face;Comb/Brush Hair;Brush/Clean Teeth;Wash/Dry Hands   Limitation Noted In Strength   Findings Pt S in stance at sink to complete oral hygiene, pt maintained NWB status of RLE without VC from OT  Pt seated w/c level to complete rest of grooming routine for safety  Grooming (FIM) 5 - Patient requires supervision/monitoring   QI: Shower/Bathe Self   Assistance Needed Supervision   Assistance Provided by [place] No physical assistance   Shower/Bathe Self CARE Score 4   Bathing   Assessed Bath Style Sponge Bath   Anticipated D/C Bath Style Sponge Bath   Able to Gather/Transport Yes   Able to Adjust Water Temperature Yes   Able to Wash/Rinse/Dry (body part) Left Arm;Right Arm;L Upper Leg;R Upper Leg;L Lower Leg/Foot;R Lower Leg/Foot;Chest;Abdomen;Perineal Area; Buttocks   Limitations Noted in Balance; Safety;Problem Solving;ROM   Positioning Seated;Standing   Findings  Pt cont to complete bathing routine at S level 2* pt requires VC to not stand without TLSO brace on and to maintain spinal precautions  Pt able to utilize cross leg method to bathe b/l LE w/ inc effort, OT recommend LH loofa/sponge to inc pt indep, pt receptive   Pt in stance to bathe buttocks/perineal area w/ no LOB noted  Bathing (FIM) 5 - Patient requires supervision/monitoring but completes 10/10 parts   Tub/Shower Transfer   Not Assessed Safety;Sponge Bath;Medical   Tub Transfer (FIM) 0 - Activity does not occur   Shower Transfer (FIM) 0 - Activity does not occur   QI: Upper Body Dressing   Assistance Needed Supervision   Assistance Provided by [place] No physical assistance   Comment Pt cont to require VC to pull velcro straps of TLSO brace prior to standing, however pt is able to don/doff TLSO while seated EOB  Pt attempted to stand to doff shirt without TLSO brace on and required prompt from OT to correct  Upper Body Dressing CARE Score 4   QI: Lower Body Dressing   Assistance Needed Supervision   Assistance Provided by [place] No physical assistance   Comment Pt seated to utilize cross leg method to thread shorts over b/l LE and still maintain spinal precautions  Pt S in stance at sink for CM over hips  Lower Body Dressing CARE Score 4   QI: Putting On/Taking Off Footwear   Assistance Needed Supervision   Assistance Provided by [place] No physical assistance   Comment Pt utilizes cross leg method to don/doff R sock and to don slip on shoe  Putting On/Taking Off Footwear CARE Score 4   QI: Picking Up Object   Assistance Needed Adaptive equipment   Assistance Provided by [place] No physical assistance   Comment Pt utilized LHR to retrieve towels and dirty clothing from the floor and then placed items in dirty laundry bag  Picking Up Object CARE Score 6   Dressing/Undressing Clothing   Able to  Obtain Clothing  (don/doff TLSO)   Remove UB Clothes Pullover Shirt   Remove LB Clothes Shorts;Socks   [address] LB Clothes Shorts;Socks; Shoes   Limitations Noted In Balance; Endurance;Problem Solving; Safety;ROM   Positioning Supported Sit;Standing   Findings See above note for ADL fxn detail      UB Dressing (FIM) 5 - Patient requires supervision/monitoring   LB Dressing (FIM) 5 - Patient requires supervision/monitoring   QI: Roll Left and Right   Assistance Needed Supervision   Assistance Provided by [place] No physical assistance   Roll Left and Right CARE Score 4   QI: Lying to Sitting on Side of Bed   Assistance Needed Supervision   Assistance Provided by [place] No physical assistance   Lying to Sitting on Side of Bed CARE Score 4   QI: Sit to [address] Provided by [place] No physical assistance   Sit to Stand CARE Score 4   QI: Chair/Bed-to-Chair Transfer   Assistance Needed Supervision   Assistance Provided by [place] No physical assistance   Comment SPT no AD   Chair/Bed-to-Chair Transfer CARE Score 4   Transfer Bed/Chair/Wheelchair   Positioning Concerns Cognition   Limitations Noted In Balance; Endurance;Problem Solving   Adaptive Equipment None   Stand Pivot Supervision   Sit to Stand Supervision   Stand to Sit Supervision   Supine to Sit Supervision   Findings Pt completes transfers at S level 2* cont to require VC for safe set up of fxnl transfer for w/c<>recliner  Pt does demo improved carryover w/ locking w/c brakes  Bed, Chair, Wheelchair Transfer (FIM) 5 - Patient requires supervision/monitoring   QI: [address] Provided by [place] No physical assistance   Loren[address] Score 4   Toileting   Able to [address] down yes, up yes  Able to Manage Clothing Closures Yes   Manage Hygiene Bladder   Limitations Noted In Safety;Balance;Problem Solving   Findings Pt demo G carryover for unilateral support on RW for CM over hips while in stance  Pt completes perineal hygiene seated      Toileting (FIM) 5 - Patient requires supervision/monitoring   QI: Toilet Transfer   Assistance Needed Supervision   Assistance Provided by [place] No physical assistance   Toilet Transfer CARE Score 4   Toilet Transfer   Surface Assessed Standard Commode   Transfer Technique Stand Pivot Limitations Noted In Balance;Problem Solving; Endurance   Adaptive Equipment (commode arm rests)   Findings Pt noted w/ improved safety awareness for toilet transfer this session, however requires inc time to problem solve set up  Pt requires use of commode arm rests and will not have GB available at home  Pt completes SPT w/c<>commode over toilet at S level w/ no LOB noted  Toilet Transfer (FIM) 5 - Patient requires supervision/monitoring   Cognition   Overall Cognitive Status WFL   Arousal/Participation Alert; Cooperative   Attention Within functional limits   Orientation Level Oriented X4   Memory Within functional limits   Following Commands Follows multistep commands with increased time or repetition   Comments Pt cont to recall 3/3 precautions and all safety tips, however demo difficulty w/ carryover of safety while engaged in fxnl activity/ADL  Activity Tolerance   Activity Tolerance Patient tolerated treatment well   Assessment   Treatment Assessment Pt participated in skilled OT Tx session w/ focus on completing ADL routine and safety awareness  Pt cont to require S during ADL 2* decreased carryover for precautions and TLSO management despite repetitive education  Pt will have family present to A at home  OT answered all pt questions and concerns for home  Pt noted w/ improved endurance for fxnl w/c mobility  Session focused on cont review of safe transfers and all precautions  Cont OT POC to maximize pt indep for ADLs and all fxnl transfers for safe D/C home w/ family support  Commode, RW, and w/c ordered and pt ready for D/C  Prognosis Good   Problem List Decreased strength;Decreased endurance; Impaired balance;Decreased mobility; Decreased safety awareness;Orthopedic restrictions   Barriers to Discharge Inaccessible home environment   Plan   Treatment/Interventions ADL retraining;Functional transfer training;LE strengthening/ROM; Therapeutic exercise; Endurance training;Cognitive reorientation;Patient/family training;Equipment eval/education; Bed mobility; Compensatory technique education   Progress Progressing toward goals   Recommendation   OT Discharge Recommendation Home with family support   OT - OK to Discharge Yes   OT Therapy Minutes   OT Time In 0700   OT Time Out 0830   OT Total Time (minutes) 90   OT Mode of treatment - Individual (minutes) 90   OT Mode of treatment - Concurrent (minutes) 0   OT Mode of treatment - Group (minutes) 0   OT Mode of treatment - Co-treat (minutes) 0   OT Mode of Teatment - Total time(minutes) 90 minutes

## 2018-10-15 NOTE — PHYSICAL THERAPY NOTE
D/C summary    Pt  Demonstrate good progress since initial evaluation and able to achieve S level with functional mobility w/c level  Pt  Due to dec safety awareness and dec carryover of safety techniques was not able to be progressed to mod I level  Pt  Unable to achieve goals for ambulation due to patient limited by pain when leg is in dependent position making patient unsafe to ambulate further  Pt  Now d/c to home with 24/7 S from family  FT completed last Saturday with education and instruction on how to safely assist/ supervise patient with mobility and also how to bump up patient on w/c for OSWALDO

## 2018-10-15 NOTE — DISCHARGE INSTRUCTIONS
Should you develop fevers, chills, sweats, rigors, or any drainage from your surgical site please contact your family doctor or surgeon immediately or go to the ER immediately as these are indicators of possible infection     Please note you are restricted from driving/operating a motorized vehicle/operating heavy machinery/etc until you are cleared by your family physician and/or surgeon  You may require a formal driving evaluation  This service is offered through [address] [place] [address] driving evaluation program on [address] however this evaluation can be done at a site of your choosing  Please contact your family doctor for a referral when your family doctor clears you to perform this evaluation once your neurologic/physical deficits have stabilized  Please see your doctors listed in the follow up providers section of your discharge paperwork, and take the discharge paperwork with you to your appointments    Please note changes may have been made to your medications please refer to your discharge paperwork for your current medications and take this list with you to all your doctors appointments for your doctors to review    Please do not resume a home medication unless the medication reconciliation sheet indicates to do so, please do not assume that a medication that you were given a prescription for is the same as a medication you have at home based on both medications having the same name as dosages and frequency may have changed    Please check your blood sugars 4 times daily before meals and at bedtime and record them to provide to your doctors, contact your family doctor immediately for blood sugars higher than 220 or lower than 100     Please note the following incidental findings were found during your recent hospitalization please discuss them with your doctors so that they may arrange any tests/referrals as they deem necessary:   · Anemia - This is the term used to indicate low hemoglobin   This remained stable throughout your rehabilitation stay  Please follow-up with your family physician  · Lung nodule(s) - This was found incidentally on imaging during your acute hospital stay  Please follow-up with your family physician for further monitoring and workup as needed  · Liver hemangioma: This was found incidentally on imaging during your acute hospital stay  Please follow-up with your family physician for further monitoring and workup as needed  Please avoid NSAID (including but not limited to advil, aleve, motrin, naproxen, ibuprofen, mobic, meloxicam, diclofenac etc) medications as NSAID medications may delay bone healing    Please avoid NSAID (including but not limited to advil, aleve, motrin, naproxen, ibuprofen, mobic, meloxicam diclofenac etc) medications, anti platelet medications (including but not limited to plavix, aspirin and aspirin containing products), and any prescription blood thinners due to your already being on Lovenox for a total of a 28 day course (you have been provided with a prescription for the remaining days on your 28 day course)  You may be cleared by your doctors to use these medications after you have completed your 28 day course however please do not use them unless your are advised to do so by your doctors as the use of these medications in combination with Lovenox can increase bleeding risk       Unless specifically noted in your medication list provided to you in your discharge paper work, please discuss with your family doctor prior to resuming any vitamins/minerals/supplements you may have been taking prior to your hospitalization     Please note a summary of your hospital stay with relevant information for your doctors has been sent to them, please confirm with your doctors at your follow up visits that they have received this summary and have them contact [address] [place] [address] if they have not received them along with any other medical records they may require

## 2018-10-15 NOTE — NURSING NOTE
D/c instructions discussed with pt and daughter  All questions answered  Prescriptions and d/c instructions  handed to pt  Pt's own WC, RW, and commode delivered to room and sent home with pt  All belongings packed and sent with pt
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
## 2018-10-15 NOTE — SPEECH THERAPY NOTE
SLP Discharge Summary     Pt discharged home to dtr's house for supervision/support on 10/15/18  At time of d/c, pt was able to achieve all LTG's at this time  Pt initially completing the RIPA:G-2, where pt's overall score was mild-moderately impaired for cognitive linguistic skills  Of note, pt's overall ability to improve for functional executive function skills was from mod A to supervision level at time of d/c  Additionally, STM skills were mod A initially upon admission and was able to improve to supervision level as well  Increased education given to both pt and pt's dtr in regards to strategies to improve STM recall given, where as memory packet handout also provided to pt during family training session  Additional cognitive linguistic skills, comprehension, expression and social interaction improved from min A to mod I level at time of d/c  Pt will benefit from SLP services at the OP level once appropriate for those services at this time

## 2018-10-15 NOTE — TELEPHONE ENCOUNTER
----- Message from Melissa Allen MD sent at 10/15/2018 11:22 AM EDT -----  Regarding: HFU - Post ARC  Post-ARC follow-up with Dr Tonny Deluca

## 2018-10-15 NOTE — SOCIAL WORK
CM received DME request from therapist today  Financial assistance papers signed and submitted with order to [facility], faxed to [phone] and sent through [facility]  Patient discharging today and therapy calling to check on status of delivery  VM to Dian at [facility] to make aware of urgent order

## 2018-10-15 NOTE — DISCHARGE INSTR - AVS FIRST PAGE
TLSO brace to be worn when out of bed of head of bed greater than 45 degrees  May be removed for showering  No heavy lifting  No strenuous activities  No bending/twisting  No Driving  **Please notify MD immediately if you have increased back or leg pain  New numbness, tingling and/or weakness in your legs  **
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
## 2018-10-16 NOTE — CASE MANAGEMENT
Team dc summary - pt made good progress and returned home w/family support and contd c through [facility] for rn pt and ot  Pt received a w/c, walker and commode through [facility] present for training and for dc instructions, aware of pts functional ability
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
## 2018-10-17 ENCOUNTER — TELEPHONE (OUTPATIENT)
Dept: NEUROSURGERY | Facility: CLINIC | Age: 58
End: 2018-10-17

## 2018-10-17 NOTE — TELEPHONE ENCOUNTER
10/17/2018-PER ERICK CONSULT NOTE,four weeks with repeat CT head along with thoracolumbar upright x-rays  [facility] VISITING NURSE SCHED CT FOR 2018, PT NEEDS HOSP F/U AFTER CT  CALLED PT AND LEFT MESSAGE ON MACHINE TO SCHEDULE HOSP F/U APPT     ----- Message from Camryn Cason sent at 10/16/2018  7:51 AM EDT -----  [facility] [address] F/U  PER ED-PA PT WILL NEED TO RESCHEDULE TODAY'S APPT FOR 2 WKS ONCE DISCHARGED, WITH REPEAT CT

## 2018-10-18 NOTE — PROGRESS NOTES
OT DISCHARGE SUMMARY    PT MADE GOOD PROGRESS DURING ACUTE REHAB STAY AND ACHIEVED ALL SUPERVISION GOALS FOR ADLS  PT HOWEVER DID NOT PROGRESS TO MOD I FOR GROOMING AND REQUIRES SUPERVISION AT TIME OF D/C  PT AIDE TO RECALL BACK PRECAUTIONS BUT REQUIRED 10% VERBAL CUEING FOR REMINDERS DURING FXNL TASKS ONLY  PT WAS ABLE TO CROSS LEG OVER OPPOSING KNEE TO DON/DOFF SOCKS AND PANTS  DTR CAME IN FOR FAMILY TRAINING AND DISCUSSED HOME SET UP  PT WILL BRING W/C TO DOORWAY OF BATHROOM AND COMPLETE STAND PIVOT TO TOILET DUE TO SMALL SIZE OF BATHROOM  PT DTR COMPLETED TRAINING WITH TLSO BRACE AND ADLS AND DEMONSTRATES GOOD UNDERSTANDING AND POSITIONING  ORDERED W/C, RW, AND COMMODE FOR D/C  PT WILL D/C HOME WITH HOME OT AND PT SERVICES      Sofia Juárez, OT

## 2018-10-18 NOTE — TELEPHONE ENCOUNTER
Patient was called today  Could not leave a message, vmail is full  Patient is schedule for 11/20/18 @#10 am with ED       10/19/18  Patient was called today to confirmed her 11/20/18 appt  @ 6th fl   Patient Agreed

## 2018-10-23 ENCOUNTER — TELEPHONE (OUTPATIENT)
Dept: OBGYN CLINIC | Facility: HOSPITAL | Age: 58
End: 2018-10-23

## 2018-10-23 NOTE — TELEPHONE ENCOUNTER
Patient sees Dr Juan Johnson with [facility]      Patient had sx on 9/28/18  Juan is calling to find out why we only took out some of the patient's stitches but I advised her that we have not seen the patient in office since the surgery  Patient is stating that the hospital took out stitches before she left but her discharge date was 10/2/18 which was only 4 days post op  Patient is scheduled to be seen on 10/25/18

## 2018-10-25 ENCOUNTER — HOSPITAL ENCOUNTER (OUTPATIENT)
Dept: RADIOLOGY | Facility: HOSPITAL | Age: 58
Discharge: HOME | End: 2018-10-25
Attending: ORTHOPAEDIC SURGERY
Payer: COMMERCIAL

## 2018-10-25 ENCOUNTER — OFFICE VISIT (OUTPATIENT)
Dept: OBGYN CLINIC | Facility: HOSPITAL | Age: 58
End: 2018-10-25

## 2018-10-25 VITALS — DIASTOLIC BLOOD PRESSURE: 88 MMHG | SYSTOLIC BLOOD PRESSURE: 153 MMHG | HEART RATE: 90 BPM

## 2018-10-25 DIAGNOSIS — Z98.890 STATUS POST SURGERY: ICD-10-CM

## 2018-10-25 DIAGNOSIS — M89.8X6 PAIN OF RIGHT TIBIA: Primary | ICD-10-CM

## 2018-10-25 DIAGNOSIS — M89.8X6 PAIN OF RIGHT TIBIA: ICD-10-CM

## 2018-10-25 PROCEDURE — 99024 POSTOP FOLLOW-UP VISIT: CPT | Performed by: ORTHOPAEDIC SURGERY

## 2018-10-25 PROCEDURE — 73590 X-RAY EXAM OF LOWER LEG: CPT

## 2018-10-25 RX ORDER — GABAPENTIN 300 MG/1
1 CAPSULE ORAL 3 TIMES DAILY
COMMUNITY
Start: 2014-10-01 | End: 2018-12-06

## 2018-10-25 RX ORDER — LANCETS 30 GAUGE
EACH MISCELLANEOUS
COMMUNITY
Start: 2014-10-01

## 2018-10-25 NOTE — PROGRESS NOTES
63 y o female presents for 4 weeks postoperative visit status post right tibial shaft fracture fixation and medial malleolar fracture fixation  Patient states she has been doing well with home physical therapy  She has been compliant with her nonweightbearing status in right lower extremity  She denies any calf pain  States the pain is well under control  She denies any numbness and tingling  Review of Systems  Review of systems negative unless otherwise specified in HPI    Past Medical History  Past Medical History:   Diagnosis Date    Diabetes mellitus ([address] )     Hypertension        Past Surgical History  Past Surgical History:   Procedure Laterality Date    OH TREAT TIBIAL SHAFT FX, INTRAMED IMPLANT Right 9/28/2018    Procedure: OPEN TIBIA WOUND [address] OUT;  INSERTION NAIL IM TIBIA  AND MEDIAL MALLEOLUS FRACTURE WITH SYNTHES HARDWARE;  Surgeon: Sintia Willis MD;  Location: [facility];  Service: Orthopedics    TUBAL LIGATION         Current Medications  Current Outpatient Prescriptions on File Prior to Visit   Medication Sig Dispense Refill    glimepiride (AMARYL) 4 mg tablet Take 1 tablet (4 mg total) by mouth daily with breakfast 30 tablet 3    lisinopril (ZESTRIL) 10 mg tablet Take 1 tablet (10 mg total) by mouth daily 30 tablet 3    metFORMIN (GLUCOPHAGE) 1000 MG tablet Take 1 tablet by mouth 2 (two) times a day      metFORMIN (GLUCOPHAGE) 1000 MG tablet Take 1 tablet (1,000 mg total) by mouth 2 (two) times a day with meals  0    pravastatin (PRAVACHOL) 40 mg tablet Take 1 tablet by mouth      simvastatin (ZOCOR) 10 mg tablet Take 1 tablet (10 mg total) by mouth daily at bedtime 30 tablet 3    aspirin (ECOTRIN) 325 mg EC tablet Take 1 tablet (325 mg total) by mouth 2 (two) times a day for 10 days 20 tablet 0     No current facility-administered medications on file prior to visit          Recent Labs [facility])    0  Lab Value Date/Time   HCT 30 3 (L) 10/14/2018 0556 HGB 9 5 (L) 10/14/2018 0518   WBC 6 15 10/14/2018 0518   INR 1 06 09/28/2018 0814   GLUCOSE 356 (H) 09/28/2018 0331   GLUCOSE 132 10/21/2014 1003   HGBA1C 9 3 (H) 09/28/2018 0814   HGBA1C 9 2 (H) 05/26/2015 1137         Physical exam  · General: Awake, Alert, Oriented  · Eyes: Pupils equal, round and reactive to light  · Heart: regular rate and rhythm  · Lungs: No audible wheezing    right Lower extremity  · Examination of patient's left lower extremity well-healed anterior knee incisions and medial distal incisions of the lower leg   Full active extension of the left knee and flexion to greater than 120° active ankle dorsiflexion to neutral plantar flexion to greater than 30° sensation intact distal pulses present    Imaging  X-rays of the right tibia fibular reviewed x-rays reveal no evidence of hardware failure evidence of callus formation surrounding the fracture site no evidence of interval change ankle mortise well aligned    Assessment:  Status post right tibia shaft fracture intramedullary nailing and ankle medial malleolar fracture ORIF  Plan:  Continue nonweightbearing right lower extremity in Cam walker boot  Physical therapy range of motion should gentle strengthening left knee left ankle  Start weight-bearing as tolerated after November 9, 2018  Follow-up in 6 weeks time repeat x-rays left tibia-fibula

## 2018-10-26 ENCOUNTER — OFFICE VISIT (OUTPATIENT)
Dept: INTERNAL MEDICINE CLINIC | Facility: CLINIC | Age: 58
End: 2018-10-26
Payer: COMMERCIAL

## 2018-10-26 VITALS
HEART RATE: 80 BPM | BODY MASS INDEX: 29.3 KG/M2 | HEIGHT: 63 IN | DIASTOLIC BLOOD PRESSURE: 82 MMHG | WEIGHT: 165.34 LBS | TEMPERATURE: 98.2 F | SYSTOLIC BLOOD PRESSURE: 120 MMHG

## 2018-10-26 DIAGNOSIS — S82.201F: ICD-10-CM

## 2018-10-26 DIAGNOSIS — I10 ESSENTIAL HYPERTENSION: ICD-10-CM

## 2018-10-26 DIAGNOSIS — S06.5X9A SUBDURAL HEMATOMA (HCC): ICD-10-CM

## 2018-10-26 DIAGNOSIS — D50.8 OTHER IRON DEFICIENCY ANEMIA: ICD-10-CM

## 2018-10-26 DIAGNOSIS — S82.401F: ICD-10-CM

## 2018-10-26 DIAGNOSIS — E11.9 TYPE 2 DIABETES MELLITUS WITHOUT COMPLICATION, WITHOUT LONG-TERM CURRENT USE OF INSULIN (HCC): Primary | ICD-10-CM

## 2018-10-26 DIAGNOSIS — E78.2 MIXED HYPERLIPIDEMIA: ICD-10-CM

## 2018-10-26 LAB
LEFT EYE DIABETIC RETINOPATHY: NORMAL
LEFT EYE IMAGE QUALITY: NORMAL
RIGHT EYE DIABETIC RETINOPATHY: NORMAL
RIGHT EYE IMAGE QUALITY: NORMAL
SEVERITY (EYE EXAM): NORMAL

## 2018-10-26 PROCEDURE — 92250 FUNDUS PHOTOGRAPHY W/I&R: CPT | Performed by: PHYSICIAN ASSISTANT

## 2018-10-26 PROCEDURE — 99204 OFFICE O/P NEW MOD 45 MIN: CPT | Performed by: PHYSICIAN ASSISTANT

## 2018-10-26 RX ORDER — SIMVASTATIN 10 MG
10 TABLET ORAL
Qty: 90 TABLET | Refills: 0 | Status: SHIPPED | OUTPATIENT
Start: 2018-10-26 | End: 2018-10-26 | Stop reason: SDUPTHER

## 2018-10-26 RX ORDER — GLIMEPIRIDE 4 MG/1
4 TABLET ORAL
Qty: 90 TABLET | Refills: 0 | Status: SHIPPED | OUTPATIENT
Start: 2018-10-26 | End: 2019-02-18 | Stop reason: SDUPTHER

## 2018-10-26 RX ORDER — SIMVASTATIN 10 MG
10 TABLET ORAL
Qty: 90 TABLET | Refills: 0 | Status: SHIPPED | OUTPATIENT
Start: 2018-10-26 | End: 2019-02-18 | Stop reason: SDUPTHER

## 2018-10-26 RX ORDER — LISINOPRIL 10 MG/1
10 TABLET ORAL DAILY
Qty: 90 TABLET | Refills: 0 | Status: SHIPPED | OUTPATIENT
Start: 2018-10-26 | End: 2019-02-18 | Stop reason: SDUPTHER

## 2018-10-26 NOTE — PATIENT INSTRUCTIONS
I sent medication refills to your pharmacy  Get the labs completed as dated  Appt here in 3 months or after insurance is active    As we discussed I am continuing you on the same medications from the hospital for your blood pressure, cholesterol and diabetes  You are aware that once your insurance is active you will call and schedule an appointment with me so that I may adjust your diabetic medications  Please keep all follow-up appointments as scheduled with Orthopedics, neuro surgery and physical therapy as scheduled  Once we obtain your previous medical records from [place] we will advise when you will need your mammogram and colonoscopy
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
## 2018-10-29 ENCOUNTER — TELEPHONE (OUTPATIENT)
Dept: INTERNAL MEDICINE CLINIC | Facility: CLINIC | Age: 58
End: 2018-10-29

## 2018-10-29 DIAGNOSIS — E11.3499 SEVERE NONPROLIFERATIVE DIABETIC RETINOPATHY ASSOCIATED WITH TYPE 2 DIABETES MELLITUS, MACULAR EDEMA PRESENCE UNSPECIFIED, UNSPECIFIED LATERALITY (HCC): Primary | ICD-10-CM

## 2018-10-31 NOTE — TELEPHONE ENCOUNTER
Faxed Dr Torres Livers order and demographics  As soon as I have an appointment date and time, I will communicate it to patient

## 2018-11-12 ENCOUNTER — EVALUATION (OUTPATIENT)
Dept: PHYSICAL THERAPY | Facility: CLINIC | Age: 58
End: 2018-11-12
Payer: COMMERCIAL

## 2018-11-12 ENCOUNTER — TELEPHONE (OUTPATIENT)
Dept: INTERNAL MEDICINE CLINIC | Facility: CLINIC | Age: 58
End: 2018-11-12

## 2018-11-12 ENCOUNTER — APPOINTMENT (OUTPATIENT)
Dept: LAB | Facility: CLINIC | Age: 58
End: 2018-11-12
Payer: COMMERCIAL

## 2018-11-12 DIAGNOSIS — Z87.81 S/P ORIF (OPEN REDUCTION INTERNAL FIXATION) FRACTURE: Primary | ICD-10-CM

## 2018-11-12 DIAGNOSIS — M25.571 ACUTE RIGHT ANKLE PAIN: ICD-10-CM

## 2018-11-12 DIAGNOSIS — E11.9 TYPE 2 DIABETES MELLITUS WITHOUT COMPLICATION, WITHOUT LONG-TERM CURRENT USE OF INSULIN (HCC): ICD-10-CM

## 2018-11-12 DIAGNOSIS — Z98.890 STATUS POST SURGERY: ICD-10-CM

## 2018-11-12 DIAGNOSIS — Z98.890 S/P ORIF (OPEN REDUCTION INTERNAL FIXATION) FRACTURE: Primary | ICD-10-CM

## 2018-11-12 DIAGNOSIS — D50.8 OTHER IRON DEFICIENCY ANEMIA: ICD-10-CM

## 2018-11-12 LAB
ALBUMIN SERPL BCP-MCNC: 3.8 G/DL (ref 3.5–5)
ALP SERPL-CCNC: 105 U/L (ref 46–116)
ALT SERPL W P-5'-P-CCNC: 21 U/L (ref 12–78)
ANION GAP SERPL CALCULATED.3IONS-SCNC: 11 MMOL/L (ref 4–13)
AST SERPL W P-5'-P-CCNC: 11 U/L (ref 5–45)
BASOPHILS # BLD AUTO: 0.03 THOUSANDS/ΜL (ref 0–0.1)
BASOPHILS NFR BLD AUTO: 0 % (ref 0–1)
BILIRUB SERPL-MCNC: 0.4 MG/DL (ref 0.2–1)
BUN SERPL-MCNC: 13 MG/DL (ref 5–25)
CALCIUM SERPL-MCNC: 9.8 MG/DL (ref 8.3–10.1)
CHLORIDE SERPL-SCNC: 101 MMOL/L (ref 100–108)
CHOLEST SERPL-MCNC: 97 MG/DL (ref 50–200)
CO2 SERPL-SCNC: 26 MMOL/L (ref 21–32)
CREAT SERPL-MCNC: 0.61 MG/DL (ref 0.6–1.3)
CREAT UR-MCNC: 102 MG/DL
EOSINOPHIL # BLD AUTO: 0.08 THOUSAND/ΜL (ref 0–0.61)
EOSINOPHIL NFR BLD AUTO: 1 % (ref 0–6)
ERYTHROCYTE [DISTWIDTH] IN BLOOD BY AUTOMATED COUNT: 13.2 % (ref 11.6–15.1)
EST. AVERAGE GLUCOSE BLD GHB EST-MCNC: 171 MG/DL
GFR SERPL CREATININE-BSD FRML MDRD: 101 ML/MIN/1.73SQ M
GLUCOSE P FAST SERPL-MCNC: 153 MG/DL (ref 65–99)
HBA1C MFR BLD: 7.6 % (ref 4.2–6.3)
HCT VFR BLD AUTO: 38.1 % (ref 34.8–46.1)
HDLC SERPL-MCNC: 42 MG/DL (ref 40–60)
HGB BLD-MCNC: 12.1 G/DL (ref 11.5–15.4)
IMM GRANULOCYTES # BLD AUTO: 0.02 THOUSAND/UL (ref 0–0.2)
IMM GRANULOCYTES NFR BLD AUTO: 0 % (ref 0–2)
LDLC SERPL CALC-MCNC: 38 MG/DL (ref 0–100)
LYMPHOCYTES # BLD AUTO: 2.84 THOUSANDS/ΜL (ref 0.6–4.47)
LYMPHOCYTES NFR BLD AUTO: 41 % (ref 14–44)
MCH RBC QN AUTO: 27.4 PG (ref 26.8–34.3)
MCHC RBC AUTO-ENTMCNC: 31.8 G/DL (ref 31.4–37.4)
MCV RBC AUTO: 86 FL (ref 82–98)
MICROALBUMIN UR-MCNC: 10.1 MG/L (ref 0–20)
MICROALBUMIN/CREAT 24H UR: 10 MG/G CREATININE (ref 0–30)
MONOCYTES # BLD AUTO: 0.27 THOUSAND/ΜL (ref 0.17–1.22)
MONOCYTES NFR BLD AUTO: 4 % (ref 4–12)
NEUTROPHILS # BLD AUTO: 3.74 THOUSANDS/ΜL (ref 1.85–7.62)
NEUTS SEG NFR BLD AUTO: 54 % (ref 43–75)
NRBC BLD AUTO-RTO: 0 /100 WBCS
PLATELET # BLD AUTO: 298 THOUSANDS/UL (ref 149–390)
PMV BLD AUTO: 11 FL (ref 8.9–12.7)
POTASSIUM SERPL-SCNC: 4 MMOL/L (ref 3.5–5.3)
PROT SERPL-MCNC: 8.2 G/DL (ref 6.4–8.2)
RBC # BLD AUTO: 4.41 MILLION/UL (ref 3.81–5.12)
SODIUM SERPL-SCNC: 138 MMOL/L (ref 136–145)
TRIGL SERPL-MCNC: 85 MG/DL
WBC # BLD AUTO: 6.98 THOUSAND/UL (ref 4.31–10.16)

## 2018-11-12 PROCEDURE — 85025 COMPLETE CBC W/AUTO DIFF WBC: CPT

## 2018-11-12 PROCEDURE — 82570 ASSAY OF URINE CREATININE: CPT

## 2018-11-12 PROCEDURE — G8979 MOBILITY GOAL STATUS: HCPCS | Performed by: PHYSICAL THERAPIST

## 2018-11-12 PROCEDURE — 82043 UR ALBUMIN QUANTITATIVE: CPT

## 2018-11-12 PROCEDURE — 83036 HEMOGLOBIN GLYCOSYLATED A1C: CPT

## 2018-11-12 PROCEDURE — 36415 COLL VENOUS BLD VENIPUNCTURE: CPT

## 2018-11-12 PROCEDURE — 80053 COMPREHEN METABOLIC PANEL: CPT

## 2018-11-12 PROCEDURE — G8978 MOBILITY CURRENT STATUS: HCPCS | Performed by: PHYSICAL THERAPIST

## 2018-11-12 PROCEDURE — 80061 LIPID PANEL: CPT

## 2018-11-12 PROCEDURE — 97162 PT EVAL MOD COMPLEX 30 MIN: CPT | Performed by: PHYSICAL THERAPIST
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
## 2018-11-13 NOTE — TELEPHONE ENCOUNTER
ATTEMPTED TO CONTACT PATIENT BUT THERE WAS NO RESPONSE, LEFT A VOICEMAIL ADVISING PATIENT TO PLEASE RETURN MY PHONE CALL

## 2018-11-14 ENCOUNTER — HOSPITAL ENCOUNTER (OUTPATIENT)
Dept: CT IMAGING | Facility: HOSPITAL | Age: 58
Discharge: HOME | End: 2018-11-14
Payer: COMMERCIAL

## 2018-11-14 DIAGNOSIS — S06.5X9A SUBDURAL HEMATOMA (HCC): ICD-10-CM

## 2018-11-14 DIAGNOSIS — I60.9 SUBARACHNOID BLEED (HCC): ICD-10-CM

## 2018-11-14 PROCEDURE — 70450 CT HEAD/BRAIN W/O DYE: CPT

## 2018-11-14 NOTE — TELEPHONE ENCOUNTER
Second attempt made to contact pt but there was no answer so vm was left to pt in [place] to call us back

## 2018-11-15 ENCOUNTER — OFFICE VISIT (OUTPATIENT)
Dept: PHYSICAL THERAPY | Facility: CLINIC | Age: 58
End: 2018-11-15
Payer: COMMERCIAL

## 2018-11-15 DIAGNOSIS — Z98.890 S/P ORIF (OPEN REDUCTION INTERNAL FIXATION) FRACTURE: Primary | ICD-10-CM

## 2018-11-15 DIAGNOSIS — Z98.890 STATUS POST SURGERY: ICD-10-CM

## 2018-11-15 DIAGNOSIS — Z87.81 S/P ORIF (OPEN REDUCTION INTERNAL FIXATION) FRACTURE: Primary | ICD-10-CM

## 2018-11-15 DIAGNOSIS — M25.571 ACUTE RIGHT ANKLE PAIN: ICD-10-CM

## 2018-11-15 PROCEDURE — 97112 NEUROMUSCULAR REEDUCATION: CPT | Performed by: PHYSICAL THERAPIST

## 2018-11-15 PROCEDURE — 97110 THERAPEUTIC EXERCISES: CPT | Performed by: PHYSICAL THERAPIST

## 2018-11-15 NOTE — TELEPHONE ENCOUNTER
SPOKE TO PTS  DAUGHTER [place]WONA WITH PT  PRESENT  GAVE RESULTS  SHE SAID SHE IS STILL WAITING TO HEAR BACK FROM INSURANCE   IF SHE DOES NOT HEAR FROM THEM IN THE NEXT FEW WEEKS SHE WILL LET US KNOW

## 2018-11-15 NOTE — PROGRESS NOTES
Daily Note     Today's date: 11/15/2018  Patient name: Lisset Spain  : 1960  MRN: [id number]  Referring provider: Tatiana Bermudez  Dx:   Encounter Diagnoses   Name Primary?  S/P ORIF (open reduction internal fixation) fracture Yes    Status post surgery     Acute right ankle pain                   Subjective: She states she has been performing exercises  Objective: See treatment diary below      Assessment: Tolerated session well with pain c walking and steps but improved to WB maximum #165  NMES tolerated well  She did have moderate buckling with RLE in steps        Plan: Patient's main goal is to walk better     Precautions: 2 unit max, WBAT RLE, T12-L3 compression fx, rib fx, sternum fx on    Daily Treatment Diary       Manuals 11/12 11/15        Ankle PROM 4' 10'        Knee PROM prn  3'                                      Exercise Diary          Heel slides seated  20x        Ankle alphabets          DF Strap ST 4x :20 4x :20        LAQ 10x 20x        SAQ 2x20         Quad set  10x        SLR strap assist 2x5 5x        WS on scale #60  160# 10x2"        Seated wobble bds CC/CCW a/p  10x        Standing place foot flat c UE support  10x3"        Gait c RW  3 laps        Step ups 6 inch  4 steps                                                                                        GOAL- RTW, walk/stairs          Modalities          NMES to R quad CP  10' no CP today

## 2018-11-19 ENCOUNTER — TRANSCRIBE ORDERS (OUTPATIENT)
Dept: RADIOLOGY | Facility: HOSPITAL | Age: 58
End: 2018-11-19

## 2018-11-19 ENCOUNTER — HOSPITAL ENCOUNTER (OUTPATIENT)
Dept: RADIOLOGY | Facility: HOSPITAL | Age: 58
Discharge: HOME | End: 2018-11-19
Payer: COMMERCIAL

## 2018-11-19 DIAGNOSIS — S32.010D CLOSED COMPRESSION FRACTURE OF THORACOLUMBAR VERTEBRA WITH ROUTINE HEALING, SUBSEQUENT ENCOUNTER: ICD-10-CM

## 2018-11-19 DIAGNOSIS — S22.080D CLOSED COMPRESSION FRACTURE OF THORACOLUMBAR VERTEBRA WITH ROUTINE HEALING, SUBSEQUENT ENCOUNTER: ICD-10-CM

## 2018-11-19 LAB
LEFT EYE DIABETIC RETINOPATHY: NORMAL
RIGHT EYE DIABETIC RETINOPATHY: NORMAL

## 2018-11-19 PROCEDURE — 72080 X-RAY EXAM THORACOLMB 2/> VW: CPT

## 2018-11-20 ENCOUNTER — OFFICE VISIT (OUTPATIENT)
Dept: PHYSICAL THERAPY | Facility: CLINIC | Age: 58
End: 2018-11-20
Payer: COMMERCIAL

## 2018-11-20 ENCOUNTER — OFFICE VISIT (OUTPATIENT)
Dept: NEUROSURGERY | Facility: CLINIC | Age: 58
End: 2018-11-20
Payer: COMMERCIAL

## 2018-11-20 VITALS
BODY MASS INDEX: 29.23 KG/M2 | TEMPERATURE: 98.2 F | HEIGHT: 63 IN | SYSTOLIC BLOOD PRESSURE: 140 MMHG | HEART RATE: 84 BPM | RESPIRATION RATE: 16 BRPM | DIASTOLIC BLOOD PRESSURE: 70 MMHG | WEIGHT: 165 LBS

## 2018-11-20 DIAGNOSIS — Z87.81 S/P ORIF (OPEN REDUCTION INTERNAL FIXATION) FRACTURE: Primary | ICD-10-CM

## 2018-11-20 DIAGNOSIS — S22.009A CLOSED FRACTURE OF THORACIC VERTEBRAL BODY (HCC): Primary | ICD-10-CM

## 2018-11-20 DIAGNOSIS — S22.080D CLOSED COMPRESSION FRACTURE OF THORACOLUMBAR VERTEBRA WITH ROUTINE HEALING, SUBSEQUENT ENCOUNTER: ICD-10-CM

## 2018-11-20 DIAGNOSIS — Z98.890 STATUS POST SURGERY: ICD-10-CM

## 2018-11-20 DIAGNOSIS — M25.571 ACUTE RIGHT ANKLE PAIN: ICD-10-CM

## 2018-11-20 DIAGNOSIS — I60.9 SUBARACHNOID BLEED (HCC): ICD-10-CM

## 2018-11-20 DIAGNOSIS — S32.010D CLOSED COMPRESSION FRACTURE OF THORACOLUMBAR VERTEBRA WITH ROUTINE HEALING, SUBSEQUENT ENCOUNTER: ICD-10-CM

## 2018-11-20 DIAGNOSIS — Z98.890 S/P ORIF (OPEN REDUCTION INTERNAL FIXATION) FRACTURE: Primary | ICD-10-CM

## 2018-11-20 DIAGNOSIS — S06.5X9A SUBDURAL HEMATOMA (HCC): ICD-10-CM

## 2018-11-20 PROCEDURE — 99213 OFFICE O/P EST LOW 20 MIN: CPT | Performed by: PHYSICIAN ASSISTANT

## 2018-11-20 PROCEDURE — 97110 THERAPEUTIC EXERCISES: CPT | Performed by: PHYSICAL THERAPIST
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
## 2018-11-20 NOTE — PROGRESS NOTES
Daily Note     Today's date: 2018  Patient name: Carli Flanagan  : 1960  MRN: [id number]  Referring provider: Ginger Rivas  Dx:   Encounter Diagnoses   Name Primary?  S/P ORIF (open reduction internal fixation) fracture Yes    Status post surgery     Acute right ankle pain                   Subjective: She states she saw MD and will be in brace for 2 more weeks  She states she was able to negotiate 5 stairs w/ assistance  Pt is not experiencing any pain at the start of this session  Objective: See treatment diary below      Assessment: Tolerated session well w/o pain during ambulation training today  Pt was instructed on how to improve gait mechanics as she presents w/ forefoot touchdown and no heel strike  Pt's gait improved over the course of the session and will continue to improve  She was able to tolerate more pressure through the leg during weight shifts, although her balance is impaired and should be improved when tolerated  Pt was also able to demonstrate improved stair management techniques and weight shifting tolerance         Plan: Patient's main goal is to walk better     Precautions: 2 unit max, WBAT RLE, T12-L3 compression fx, rib fx, sternum fx on    Daily Treatment Diary       Manuals 11/12 11/15 11/20       Ankle PROM 4' 10' 10'       Knee PROM prn  3' 3'                                     Exercise Diary          Heel slides seated  20x supine today 15x2"       Ankle alphabets          DF Strap ST 4x :20 4x :20 4x20"       LAQ 10x 20x 20x       SAQ 2x20  20x       Quad set  10x        SLR strap assist 2x5 5x 6x       WS on scale #60  160# 10x2" 160# 10x2"       Seated wobble bds CC/CCW a/p  10x 20x       Standing place foot flat c UE support  10x3"        Gait c RW  3 laps 5 laps        Step ups 6 inch  4 steps 10 steps assess full flight                                                                                       GOAL- RTW, walk/stairs Modalities          NMES to R quad CP  10' no CP today 10' no CP

## 2018-11-20 NOTE — PATIENT INSTRUCTIONS
Over the next 2-3 weeks gradually wean from the TLSO brace, alternate on and off throughout the day  Continue with restrictions, lifting, pushing, pulling no greater than 10 lb  Ambulation as tolerated  Avoid frequent bending  Return to Neurosurgery for follow-up in approximately 2-3 weeks  Thoracolumbar spine flexion-extension study a few days prior to follow-up visit

## 2018-11-23 ENCOUNTER — OFFICE VISIT (OUTPATIENT)
Dept: PHYSICAL THERAPY | Facility: CLINIC | Age: 58
End: 2018-11-23
Payer: COMMERCIAL

## 2018-11-23 DIAGNOSIS — Z87.81 S/P ORIF (OPEN REDUCTION INTERNAL FIXATION) FRACTURE: Primary | ICD-10-CM

## 2018-11-23 DIAGNOSIS — M25.571 ACUTE RIGHT ANKLE PAIN: ICD-10-CM

## 2018-11-23 DIAGNOSIS — Z98.890 STATUS POST SURGERY: ICD-10-CM

## 2018-11-23 DIAGNOSIS — Z98.890 S/P ORIF (OPEN REDUCTION INTERNAL FIXATION) FRACTURE: Primary | ICD-10-CM

## 2018-11-23 PROCEDURE — 97140 MANUAL THERAPY 1/> REGIONS: CPT

## 2018-11-23 PROCEDURE — 97112 NEUROMUSCULAR REEDUCATION: CPT

## 2018-11-23 NOTE — PROGRESS NOTES
Daily Note     Today's date: 2018  Patient name: Conner Fuentes  : 1960  MRN: [id number]  Referring provider: Prudence Nix  Dx:   Encounter Diagnoses   Name Primary?  S/P ORIF (open reduction internal fixation) fracture Yes    Status post surgery     Acute right ankle pain        Start Time: 1400  Stop Time: 1500  Total time in clinic (min): 60 minutes    Subjective: Pt reports no pain before or after session  Objective: See treatment diary below      Assessment: Tolerated session well  Pt had no difficulty going up/down full flight with B UE on Handrails  Pt had improved WB on R LE after gait training, this session pt trialed a step through gait pattern with RW  Pt was only able to put 140# during weight shifts on scale  Plan: Patient's main goal is to walk better  NV trial one handrail to emulate home setup        Precautions: 2 unit max, WBAT RLE, T12-L3 compression fx, rib fx, sternum fx on    Daily Treatment Diary       Manuals 11/12 11/15 11/20 11/23      Ankle PROM 4' 10' 10' LK 10'      Knee PROM prn  3' 3'                                     Exercise Diary          Heel slides seated  20x supine today 15x2" AAROM   20x 2"    AROM 10x      Ankle alphabets          DF Strap ST 4x :20 4x :20 4x20" 30"x3      LAQ 10x 20x 20x 20x      SAQ 2x20  20x 20x      Quad set  10x        SLR strap assist 2x5 5x 6x       WS on scale #60  160# 10x2" 160# 10x2" 145#  10x5"        Seated wobble bds CC/CCW a/p  10x 20x 20x      Standing place foot flat c UE support  10x3"        Gait c RW  3 laps 5 laps  6 laps      Step ups 6 inch  4 steps 10 steps Full flight B UE      Ankle alpha    HEP                                                                            GOAL- RTW, walk/stairs          Modalities          NMES to R quad CP  10' no CP today 10' no CP 10' CP

## 2018-11-25 ENCOUNTER — TELEPHONE (OUTPATIENT)
Dept: INTERNAL MEDICINE CLINIC | Facility: CLINIC | Age: 58
End: 2018-11-25

## 2018-11-27 ENCOUNTER — OFFICE VISIT (OUTPATIENT)
Dept: PHYSICAL THERAPY | Facility: CLINIC | Age: 58
End: 2018-11-27
Payer: COMMERCIAL

## 2018-11-27 DIAGNOSIS — Z98.890 S/P ORIF (OPEN REDUCTION INTERNAL FIXATION) FRACTURE: Primary | ICD-10-CM

## 2018-11-27 DIAGNOSIS — Z98.890 STATUS POST SURGERY: ICD-10-CM

## 2018-11-27 DIAGNOSIS — Z87.81 S/P ORIF (OPEN REDUCTION INTERNAL FIXATION) FRACTURE: Primary | ICD-10-CM

## 2018-11-27 DIAGNOSIS — M25.571 ACUTE RIGHT ANKLE PAIN: ICD-10-CM

## 2018-11-27 PROCEDURE — 97112 NEUROMUSCULAR REEDUCATION: CPT

## 2018-11-27 PROCEDURE — 97110 THERAPEUTIC EXERCISES: CPT

## 2018-11-27 NOTE — PROGRESS NOTES
Daily Note     Today's date: 2018  Patient name: Nena Larson  : 1960  MRN: [id number]  Referring provider: Dedra Montanez  Dx:   Encounter Diagnoses   Name Primary?  S/P ORIF (open reduction internal fixation) fracture Yes    Status post surgery     Acute right ankle pain        Start Time: 1400  Stop Time: 1500  Total time in clinic (min): 60 minutes    Subjective: Pt reports pain in right  c-spine "6/10" before session  Pt notes no soreness from last session  "I have been walking and cleaning all day"       Objective: See treatment diary below      Assessment: Tolerated session well  Pt had no difficulty going up/down full flight with one HR  Pt is progressing to step through gait pattern with RW  Pt had no pain with any interventions listed below  Plan: Patient's main goal is to walk better        Precautions: 2 unit max, WBAT RLE, T12-L3 compression fx, rib fx, sternum fx on    Daily Treatment Diary       Manuals 11/12 11/15 11/20 11/23 11/27     Ankle PROM 4' 10' 10' LK 10' LK 10'      Knee PROM prn  3' 3'                                     Exercise Diary          Heel slides seated  20x supine today 15x2" AAROM   20x 2"    AROM 10x 20x AROM      Ankle alphabets          DF Strap ST 4x :20 4x :20 4x20" 30"x3 30"x3     LAQ 10x 20x 20x 20x      SAQ 2x20  20x 20x 25x 3"     Quad set  10x        SLR strap assist 2x5 5x 6x  5x AROM     WS on scale #60  160# 10x2" 160# 10x2" 145#  10x5"   155#  10x5"     Seated wobble bds CC/CCW a/p  10x 20x 20x 20x     Standing place foot flat c UE support  10x3"        Gait c RW  3 laps 5 laps  6 laps 2 laps fatigued     Step ups 6 inch  4 steps 10 steps Full flight B UE L HR up R HR down     Ankle alpha    HEP      Walking with one handrail     2 laps at mirror                                                                 GOAL- RTW, walk/stairs          Modalities          NMES to R quad CP  10' no CP today 10' no CP 10' CP missed by error

## 2018-11-29 ENCOUNTER — OFFICE VISIT (OUTPATIENT)
Dept: PHYSICAL THERAPY | Facility: CLINIC | Age: 58
End: 2018-11-29
Payer: COMMERCIAL

## 2018-11-29 DIAGNOSIS — Z98.890 S/P ORIF (OPEN REDUCTION INTERNAL FIXATION) FRACTURE: Primary | ICD-10-CM

## 2018-11-29 DIAGNOSIS — Z98.890 STATUS POST SURGERY: ICD-10-CM

## 2018-11-29 DIAGNOSIS — Z87.81 S/P ORIF (OPEN REDUCTION INTERNAL FIXATION) FRACTURE: Primary | ICD-10-CM

## 2018-11-29 DIAGNOSIS — M25.571 ACUTE RIGHT ANKLE PAIN: ICD-10-CM

## 2018-11-29 PROCEDURE — 97140 MANUAL THERAPY 1/> REGIONS: CPT | Performed by: PHYSICAL THERAPIST

## 2018-11-29 PROCEDURE — 97110 THERAPEUTIC EXERCISES: CPT | Performed by: PHYSICAL THERAPIST

## 2018-11-29 NOTE — PROGRESS NOTES
Daily Note     Today's date: 2018  Patient name: Ladarius Swartz  : 1960  MRN: [id number]  Referring provider: Claudine Colón  Dx:   Encounter Diagnoses   Name Primary?  S/P ORIF (open reduction internal fixation) fracture Yes    Status post surgery     Acute right ankle pain                   Subjective: Pt reports no pain or soreness at the beginning of this session, and has continued to walk at home w/o AD  Objective: See treatment diary below      Assessment: Tolerated session well  Pt had min difficulty switching to [facility] today w/ ability to walk 150+ft today roughly to the stairs w/ proper form, but showed slow gait speed  Pt will continue to improve at home w/ (S) as needed w/ walking  Pt will progress next session walking and stairs w/ SPC  Plan: Patient's main goal is to walk better        Precautions: 2 unit max, WBAT RLE, T12-L3 compression fx, rib fx, sternum fx on    Daily Treatment Diary       Manuals 11/12 11/15 11/20 11/23 11/27 11/29    Ankle PROM 4' 10' 10' LK 10' LK 10'  10'    Knee PROM prn  3' 3'                                     Exercise Diary          Heel slides seated  20x supine today 15x2" AAROM   20x 2"    AROM 10x 20x AROM  np    Ankle alphabets          DF Strap ST 4x :20 4x :20 4x20" 30"x3 30"x3 5x20"    LAQ 10x 20x 20x 20x  20x    SAQ 2x20  20x 20x 25x 3" np    Quad set  10x        SLR strap assist 2x5 5x 6x  5x AROM AROM 2x7    WS on scale #60  160# 10x2" 160# 10x2" 145#  10x5"   155#  10x5" 160# FWB 5x    Seated wobble bds CC/CCW a/p  10x 20x 20x 20x np    Standing place foot flat c UE support  10x3"        Gait c RW  3 laps 5 laps  6 laps 2 laps fatigued 4 laps w/ breaks after 2    Step ups 6 inch  4 steps 10 steps Full flight B UE L HR up R HR down FF 1 HR    Ankle alpha    HEP      Walking with one handrail     2 laps at mirror     Bike       6'                                                      GOAL- RTW, walk/stairs          Modalities NMES to R quad CP  10' no CP today 10' no CP 10' CP missed by error continue nv                Patient treated by Estefania Gabriel, SPT under the supervision of Francisco Springer, ALISHAT,OCS

## 2018-12-04 ENCOUNTER — HOSPITAL ENCOUNTER (OUTPATIENT)
Dept: RADIOLOGY | Facility: HOSPITAL | Age: 58
Discharge: HOME | End: 2018-12-04

## 2018-12-04 ENCOUNTER — OFFICE VISIT (OUTPATIENT)
Dept: PHYSICAL THERAPY | Facility: CLINIC | Age: 58
End: 2018-12-04
Payer: COMMERCIAL

## 2018-12-04 DIAGNOSIS — S22.080D CLOSED COMPRESSION FRACTURE OF THORACOLUMBAR VERTEBRA WITH ROUTINE HEALING, SUBSEQUENT ENCOUNTER: ICD-10-CM

## 2018-12-04 DIAGNOSIS — S22.009A CLOSED FRACTURE OF THORACIC VERTEBRAL BODY (HCC): ICD-10-CM

## 2018-12-04 DIAGNOSIS — M25.571 ACUTE RIGHT ANKLE PAIN: ICD-10-CM

## 2018-12-04 DIAGNOSIS — S32.010D CLOSED COMPRESSION FRACTURE OF THORACOLUMBAR VERTEBRA WITH ROUTINE HEALING, SUBSEQUENT ENCOUNTER: ICD-10-CM

## 2018-12-04 DIAGNOSIS — Z98.890 S/P ORIF (OPEN REDUCTION INTERNAL FIXATION) FRACTURE: Primary | ICD-10-CM

## 2018-12-04 DIAGNOSIS — Z98.890 STATUS POST SURGERY: ICD-10-CM

## 2018-12-04 DIAGNOSIS — Z87.81 S/P ORIF (OPEN REDUCTION INTERNAL FIXATION) FRACTURE: Primary | ICD-10-CM

## 2018-12-04 PROCEDURE — 97110 THERAPEUTIC EXERCISES: CPT | Performed by: PHYSICAL THERAPIST

## 2018-12-04 PROCEDURE — 72110 X-RAY EXAM L-2 SPINE 4/>VWS: CPT

## 2018-12-04 NOTE — PROGRESS NOTES
Daily Note     Today's date: 2018  Patient name: Terry Farias  : 1960  MRN: [id number]  Referring provider: Rebekah Carlson  Dx:   Encounter Diagnoses   Name Primary?  S/P ORIF (open reduction internal fixation) fracture Yes    Status post surgery     Acute right ankle pain                   Subjective: Pt reports min 4/10 pain in the back of her calf and medial maleolus of R ankle  She also will be getting X-Rays today and going to MD on Thursday to discuss if she needs to continue using back brace  Objective: See treatment diary below      Assessment: Tolerated session well  Pt was able to use SPC w/ step to pattern and a 2 point contact w/ SPC on L side  Pt was educated on gait mechanics today requiring ocasional cues to progress stride length of L LE and was able to progress interventions to standing rather than sitting/ lying w/o issues  Pt is still completing stairs w/ HR on one side and SPC in the other hand in a step to pattern, requiring increased assistance for LLE descent first as she has trouble fully WBing during knee flexion  Plan: Patient's main goal is to walk better        Precautions: 2 unit max, WBAT RLE, T12-L3 compression fx, rib fx, sternum fx on    Daily Treatment Diary       Manuals 11/12 11/15 11/20 11/23 11/27 11/29 12/4   Ankle PROM 4' 10' 10' LK 10' LK 10'  10' 10'   Knee PROM prn  3' 3'    3'                                 Exercise Diary          Heel slides seated  20x supine today 15x2" AAROM   20x 2"    AROM 10x 20x AROM  np              DF Slant board 4x :20 4x :20 4x20" 30"x3 30"x3 5x20" 5x20"   LAQ 10x 20x 20x 20x  20x 20x   SAQ 2x20  20x 20x 25x 3" np    Quad set  10x        SLR  2x5 5x 6x  5x AROM AROM 2x7 2x10   WS on scale #60  160# 10x2" 160# 10x2" 145#  10x5"   155#  10x5" 160# FWB 5x FWB 10x5"   Standing wobble bds CC/CCW a/p  10x 20x 20x 20x np 20x   Standing place foot flat c UE support  10x3"        Gait c SPC  3 laps 5 laps  6 laps 2 laps fatigued 4 laps w/ breaks after 2 5 laps w/ break after 2    Step ups 6 inch  4 steps 10 steps Full flight B UE L HR up R HR down FF 1 HR FF HR L up and R down             Walking with one handrail     2 laps at mirror     Bike       6' 6'   Treadmill if bob          TG if bob                                         GOAL- RTW, walk/stairs          Modalities          NMES to R quad CP  10' no CP today 10' no CP 10' CP missed by error continue nv CP 10' around ankle               Patient treated by KRISTIN Pascal under the supervision of ALISHA LawsonT,OCS

## 2018-12-06 ENCOUNTER — HOSPITAL ENCOUNTER (OUTPATIENT)
Dept: RADIOLOGY | Facility: HOSPITAL | Age: 58
Discharge: HOME | End: 2018-12-06
Attending: ORTHOPAEDIC SURGERY

## 2018-12-06 ENCOUNTER — TELEPHONE (OUTPATIENT)
Dept: NEUROSURGERY | Facility: CLINIC | Age: 58
End: 2018-12-06

## 2018-12-06 ENCOUNTER — OFFICE VISIT (OUTPATIENT)
Dept: OBGYN CLINIC | Facility: HOSPITAL | Age: 58
End: 2018-12-06

## 2018-12-06 VITALS — SYSTOLIC BLOOD PRESSURE: 154 MMHG | DIASTOLIC BLOOD PRESSURE: 87 MMHG | HEART RATE: 83 BPM

## 2018-12-06 DIAGNOSIS — M89.8X6 PAIN IN LEFT TIBIA: ICD-10-CM

## 2018-12-06 DIAGNOSIS — Z09 POSTOP CHECK: Primary | ICD-10-CM

## 2018-12-06 PROCEDURE — 73590 X-RAY EXAM OF LOWER LEG: CPT

## 2018-12-06 PROCEDURE — 99024 POSTOP FOLLOW-UP VISIT: CPT | Performed by: ORTHOPAEDIC SURGERY

## 2018-12-06 NOTE — TELEPHONE ENCOUNTER
Daughter reports her Mother thought her f/u was today and had her xrays done on Tues  Her appt is actually next week and she hoped she did not need to repeat study  Informed her she did not

## 2018-12-06 NOTE — PROGRESS NOTES
Assessment:  1  Pain in left tibia  CANCELED: XR tibia fibula 2 vw left     Plan:     Weight Bearing  as Tolerated right lower extremity   Patient may discontinue outpatient physical therapy but was strongly encouraged to do daily home exercises prescribed physical therapy   Continue daily elevation of ankle for swelling   Ice and/or over-the-counter anti-inflammatories/analgesics as needed for pain    To do next visit:  Return in about 3 months (around 3/6/2019)  The above stated was discussed in layman's terms and the patient expressed understanding  All questions were answered to the patient's satisfaction  Subjective:   Yamel Ruiz is a 63 y o  female who presents for follow-up of right tibial nail and screw fixation of medial malleolus performed on 09/20/2018  Patient states she is doing well and begin weight-bearing as tolerated on her right lower extremity in early November without problems  She denies pain  She does however state that she has swelling that comes and goes depending on her activity  She takes over-the-counter anti-inflammatories as needed for pain  She elevates her right lower extremity for the swelling  She has tried ice but states that it causes more pain that helps  She continues to go to outpatient physical therapy twice a week         Review of systems negative unless otherwise specified in HPI    Past Medical History:   Diagnosis Date    Diabetes mellitus ([facility] [address] )     Hypertension        Past Surgical History:   Procedure Laterality Date    MT TREAT TIBIAL SHAFT FX, INTRAMED IMPLANT Right 9/28/2018    Procedure: OPEN TIBIA WOUND [address] OUT;  INSERTION NAIL IM TIBIA  AND MEDIAL MALLEOLUS FRACTURE WITH SYNTHES HARDWARE;  Surgeon: Kole Rodriguez MD;  Location: [facility];  Service: Orthopedics    TUBAL LIGATION         Family History   Problem Relation Age of Onset    Diabetes Mother     No Known Problems Father     No Known Problems Sister     No Known Problems Brother     No Known Problems Son     No Known Problems Daughter        Social History     Occupational History    Not on file  Social History Main Topics    Smoking status: Never Smoker    Smokeless tobacco: Never Used    Alcohol use No    Drug use: No    Sexual activity: Not on file         Current Outpatient Prescriptions:     glimepiride (AMARYL) 4 mg tablet, Take 1 tablet (4 mg total) by mouth daily with breakfast for 90 days, Disp: 90 tablet, Rfl: 0    Lancets MISC, by Does not apply route, Disp: , Rfl:     lisinopril (ZESTRIL) 10 mg tablet, Take 1 tablet (10 mg total) by mouth daily for 90 days, Disp: 90 tablet, Rfl: 0    metFORMIN (GLUCOPHAGE) 1000 MG tablet, Take 1 tablet (1,000 mg total) by mouth 2 (two) times a day with meals for 90 days, Disp: 180 tablet, Rfl: 0    simvastatin (ZOCOR) 10 mg tablet, Take 1 tablet (10 mg total) by mouth daily at bedtime for 90 days, Disp: 90 tablet, Rfl: 0    No Known Allergies         Vitals:    12/06/18 1323   BP: 154/87   Pulse: 83       Objective:            Physical Exam  · General: Awake, Alert, Oriented  · Eyes: Pupils equal, round and reactive to light  · Heart: regular rate and rhythm  · Lungs: No audible wheezing  · Abdomen: soft                    Right Ankle Exam   Swelling: mild    Tenderness   The patient is experiencing no tenderness  Range of Motion   Dorsiflexion: 25   Plantar flexion: normal     Muscle Strength   The patient has normal right ankle strength  Other   Erythema: absent  Sensation: normal  Pulse: present     Comments:  Incision of right lower extremity are well-healed         Left Ankle Exam   Left ankle exam is normal               Neurovascularly Intact Distally     Diagnostics, reviewed and taken today if performed as documented:    None performed      The attending physician has personally reviewed the pertinent films in PACS and interpretation is as follows:      Procedures, if performed today:    Procedures    None performed      Portions of the record may have been created with voice recognition software  Occasional wrong word or "sound a like" substitutions may have occurred due to the inherent limitations of voice recognition software  Read the chart carefully and recognize, using context, where substitutions have occurred

## 2018-12-07 ENCOUNTER — OFFICE VISIT (OUTPATIENT)
Dept: PHYSICAL THERAPY | Facility: CLINIC | Age: 58
End: 2018-12-07
Payer: COMMERCIAL

## 2018-12-07 DIAGNOSIS — M25.571 ACUTE RIGHT ANKLE PAIN: ICD-10-CM

## 2018-12-07 DIAGNOSIS — Z98.890 S/P ORIF (OPEN REDUCTION INTERNAL FIXATION) FRACTURE: Primary | ICD-10-CM

## 2018-12-07 DIAGNOSIS — Z98.890 STATUS POST SURGERY: ICD-10-CM

## 2018-12-07 DIAGNOSIS — Z87.81 S/P ORIF (OPEN REDUCTION INTERNAL FIXATION) FRACTURE: Primary | ICD-10-CM

## 2018-12-07 PROCEDURE — 97112 NEUROMUSCULAR REEDUCATION: CPT

## 2018-12-07 NOTE — PROGRESS NOTES
Daily Note     Today's date: 2018  Patient name: Alia Aguero  : 1960  MRN: [id number]  Referring provider: Theone Barthel  Dx:   Encounter Diagnoses   Name Primary?  S/P ORIF (open reduction internal fixation) fracture Yes    Status post surgery     Acute right ankle pain        Start Time: 1210  Stop Time: 1310  Total time in clinic (min): 60 minutes    Subjective: Pt reports that she will continue to wear her TLSO until   Objective: See treatment diary below    Pt present to PT session 10 minutes late and was accommodated    Assessment: Tolerated session well  Added biodex weight shifting this session for biofeedback  Plan: Patient's main goal is to walk better  Pt will continue PT once a week       Precautions: 2 unit max, WBAT RLE, T12-L3 compression fx, rib fx, sternum fx on    Daily Treatment Diary       Manuals  12   Ankle PROM 10'  10' LK 10' LK 10'  10' 10'   Knee PROM prn np  Full range  3'    3'                                 Exercise Diary          Heel slides seated np  supine today 15x2" AAROM   20x 2"    AROM 10x 20x AROM  np    Weight shifting a/p m/l  Biodex  4'         DF Slant board 30"x2  4x20" 30"x3 30"x3 5x20" 5x20"   LAQ   20x 20x  20x 20x   SAQ   20x 20x 25x 3" np    Quad set          SLR  3x10  6x  5x AROM AROM 2x7 2x10   WS on scale #60   160# 10x2" 145#  10x5"   155#  10x5" 160# FWB 5x FWB 10x5"   Standing wobble bds CC/CCW a/p 20x  20x 20x 20x np 20x   Standing place foot flat c UE support          Gait c SPC 5 laps with SPC  5 laps  6 laps 2 laps fatigued 4 laps w/ breaks after 2 5 laps w/ break after 2    Step ups 6 inch FF 1 HR and SPC  10 steps Full flight B UE L HR up R HR down FF 1 HR FF HR L up and R down             Walking with one handrail     2 laps at mirror     Bike  6'     6' 6'   Treadmill if bob          TG if bob                                         GOAL- RTW, walk/stairs Modalities          NMES to R quad CP CP ankle 10'  10' no CP 10' CP missed by error continue nv CP 10' around ankle

## 2018-12-10 ENCOUNTER — APPOINTMENT (OUTPATIENT)
Dept: PHYSICAL THERAPY | Facility: CLINIC | Age: 58
End: 2018-12-10
Payer: COMMERCIAL

## 2018-12-12 ENCOUNTER — OFFICE VISIT (OUTPATIENT)
Dept: NEUROSURGERY | Facility: CLINIC | Age: 58
End: 2018-12-12
Payer: COMMERCIAL

## 2018-12-12 VITALS
DIASTOLIC BLOOD PRESSURE: 84 MMHG | WEIGHT: 165 LBS | BODY MASS INDEX: 29.23 KG/M2 | HEIGHT: 63 IN | HEART RATE: 80 BPM | RESPIRATION RATE: 16 BRPM | SYSTOLIC BLOOD PRESSURE: 140 MMHG | TEMPERATURE: 98 F

## 2018-12-12 DIAGNOSIS — S22.080D CLOSED COMPRESSION FRACTURE OF THORACOLUMBAR VERTEBRA WITH ROUTINE HEALING, SUBSEQUENT ENCOUNTER: Primary | ICD-10-CM

## 2018-12-12 DIAGNOSIS — S22.009A CLOSED FRACTURE OF THORACIC VERTEBRAL BODY (HCC): ICD-10-CM

## 2018-12-12 DIAGNOSIS — S32.009A CLOSED FRACTURE OF LUMBAR VERTEBRAL BODY (HCC): ICD-10-CM

## 2018-12-12 DIAGNOSIS — S32.010D CLOSED COMPRESSION FRACTURE OF THORACOLUMBAR VERTEBRA WITH ROUTINE HEALING, SUBSEQUENT ENCOUNTER: Primary | ICD-10-CM

## 2018-12-12 PROCEDURE — 99213 OFFICE O/P EST LOW 20 MIN: CPT | Performed by: PHYSICIAN ASSISTANT
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
## 2018-12-12 NOTE — PATIENT INSTRUCTIONS
Start to wean from the TLSO brace over the next 7-10 days, alternate the time in and out of the brace, slowly increasing till completely weaned  As discussed a course of physical therapy after wean from the brace is advised and a consultation has been placed  Gradually return to all usual activities  Further follow-up with Neurosurgery will be on an as-needed basis 
negative...

## 2018-12-14 ENCOUNTER — EVALUATION (OUTPATIENT)
Dept: PHYSICAL THERAPY | Facility: CLINIC | Age: 58
End: 2018-12-14
Payer: COMMERCIAL

## 2018-12-14 DIAGNOSIS — Z98.890 STATUS POST SURGERY: ICD-10-CM

## 2018-12-14 DIAGNOSIS — Z87.81 S/P ORIF (OPEN REDUCTION INTERNAL FIXATION) FRACTURE: Primary | ICD-10-CM

## 2018-12-14 DIAGNOSIS — M25.571 ACUTE RIGHT ANKLE PAIN: ICD-10-CM

## 2018-12-14 DIAGNOSIS — Z98.890 S/P ORIF (OPEN REDUCTION INTERNAL FIXATION) FRACTURE: Primary | ICD-10-CM

## 2018-12-14 PROCEDURE — G8979 MOBILITY GOAL STATUS: HCPCS | Performed by: PHYSICAL THERAPIST

## 2018-12-14 PROCEDURE — G8978 MOBILITY CURRENT STATUS: HCPCS | Performed by: PHYSICAL THERAPIST

## 2018-12-14 PROCEDURE — 97110 THERAPEUTIC EXERCISES: CPT | Performed by: PHYSICAL THERAPIST
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
## 2018-12-17 ENCOUNTER — APPOINTMENT (OUTPATIENT)
Dept: PHYSICAL THERAPY | Facility: CLINIC | Age: 58
End: 2018-12-17
Payer: COMMERCIAL

## 2018-12-21 ENCOUNTER — OFFICE VISIT (OUTPATIENT)
Dept: PHYSICAL THERAPY | Facility: CLINIC | Age: 58
End: 2018-12-21
Payer: COMMERCIAL

## 2018-12-21 DIAGNOSIS — Z98.890 S/P ORIF (OPEN REDUCTION INTERNAL FIXATION) FRACTURE: Primary | ICD-10-CM

## 2018-12-21 DIAGNOSIS — M25.571 ACUTE RIGHT ANKLE PAIN: ICD-10-CM

## 2018-12-21 DIAGNOSIS — Z98.890 STATUS POST SURGERY: ICD-10-CM

## 2018-12-21 DIAGNOSIS — Z87.81 S/P ORIF (OPEN REDUCTION INTERNAL FIXATION) FRACTURE: Primary | ICD-10-CM

## 2018-12-21 PROCEDURE — 97110 THERAPEUTIC EXERCISES: CPT | Performed by: PHYSICAL THERAPIST

## 2018-12-21 NOTE — PROGRESS NOTES
Daily Note     Today's date: 2018  Patient name: Evans Benedict  : 1960  MRN: [id number]  Referring provider: Criselda Larson PA-C  Dx:   Encounter Diagnosis     ICD-10-CM    1  S/P ORIF (open reduction internal fixation) fracture Z96 7     Z87 81    2  Status post surgery Z98 890    3  Acute right ankle pain M25 571                   Subjective: Pt states that she has most pain currently in the back and has been realizing she is limping more and feels like she needs to use the [facility] outside of the house  Objective: See treatment diary below      Assessment: Tolerated treatment fair  Patient exhibited good technique with therapeutic exercises, she was able to tolerate all interventions w/ no increase in pain in the foot but did have pain in the back occasionally  Plan: Continue per plan of care  Plan: Patient's main goal is to walk better  Pt will continue PT once a week       Precautions: 2 unit max, WBAT RLE, T12-L3 compression fx, rib fx, sternum fx on    Daily Treatment Diary       Manuals    Ankle PROM 10' 10' 10' LK 10' LK 10'  10' 10'   Knee PROM prn np  Full range      3'                                 Exercise Diary                    Weight shifting a/p m/l  Biodex  4' Trial squats nv 6'       Soleus/gastroc Slant board  30"x2 "30 x3 4x20" 30"x3 30"x3 5x20" 5x20"                                 SLR  3x10    5x AROM AROM 2x7 2x10             Standing wobble bds CC/CCW a/p 20x 20x  20x 20x np 20x             Gait c SPC 5 laps with SPC   6 laps 2 laps fatigued 4 laps w/ breaks after 2 5 laps w/ break after 2    Step ups 6 inch FF 1 HR and SPC FF  Full flight B UE L HR up R HR down FF 1 HR FF HR L up and R down   Ankle 4 way RTB  2x20 RTB 2x20       Squats watch WS  2x10 Chair behind 2x10  2 laps at mirror     Bike  6' 6' 7''   6' 6'   Treadmill if bob          HR/TR  2x10 2x20       LP #45 SL HR  2x20 2x20 GOAL- RTW, walk/stairs

## 2018-12-24 ENCOUNTER — APPOINTMENT (OUTPATIENT)
Dept: PHYSICAL THERAPY | Facility: CLINIC | Age: 58
End: 2018-12-24
Payer: COMMERCIAL

## 2018-12-28 ENCOUNTER — APPOINTMENT (OUTPATIENT)
Dept: PHYSICAL THERAPY | Facility: CLINIC | Age: 58
End: 2018-12-28
Payer: COMMERCIAL

## 2018-12-28 ENCOUNTER — EVALUATION (OUTPATIENT)
Dept: PHYSICAL THERAPY | Facility: CLINIC | Age: 58
End: 2018-12-28
Payer: COMMERCIAL

## 2018-12-28 DIAGNOSIS — M54.50 ACUTE BILATERAL LOW BACK PAIN WITHOUT SCIATICA: ICD-10-CM

## 2018-12-28 DIAGNOSIS — Z98.890 STATUS POST SURGERY: ICD-10-CM

## 2018-12-28 DIAGNOSIS — Z87.81 S/P ORIF (OPEN REDUCTION INTERNAL FIXATION) FRACTURE: Primary | ICD-10-CM

## 2018-12-28 DIAGNOSIS — Z98.890 S/P ORIF (OPEN REDUCTION INTERNAL FIXATION) FRACTURE: Primary | ICD-10-CM

## 2018-12-28 DIAGNOSIS — M25.571 ACUTE RIGHT ANKLE PAIN: ICD-10-CM

## 2018-12-28 PROCEDURE — 97110 THERAPEUTIC EXERCISES: CPT | Performed by: PHYSICAL THERAPIST
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
## 2018-12-31 ENCOUNTER — APPOINTMENT (OUTPATIENT)
Dept: PHYSICAL THERAPY | Facility: CLINIC | Age: 58
End: 2018-12-31
Payer: COMMERCIAL

## 2019-01-09 ENCOUNTER — OFFICE VISIT (OUTPATIENT)
Dept: PHYSICAL THERAPY | Facility: CLINIC | Age: 59
End: 2019-01-09
Payer: COMMERCIAL

## 2019-01-09 DIAGNOSIS — M54.50 ACUTE BILATERAL LOW BACK PAIN WITHOUT SCIATICA: ICD-10-CM

## 2019-01-09 DIAGNOSIS — Z98.890 S/P ORIF (OPEN REDUCTION INTERNAL FIXATION) FRACTURE: Primary | ICD-10-CM

## 2019-01-09 DIAGNOSIS — M25.571 ACUTE RIGHT ANKLE PAIN: ICD-10-CM

## 2019-01-09 DIAGNOSIS — Z87.81 S/P ORIF (OPEN REDUCTION INTERNAL FIXATION) FRACTURE: Primary | ICD-10-CM

## 2019-01-09 DIAGNOSIS — Z98.890 STATUS POST SURGERY: ICD-10-CM

## 2019-01-09 PROCEDURE — 97110 THERAPEUTIC EXERCISES: CPT | Performed by: PHYSICAL THERAPIST

## 2019-01-09 PROCEDURE — 97140 MANUAL THERAPY 1/> REGIONS: CPT | Performed by: PHYSICAL THERAPIST

## 2019-01-09 NOTE — PROGRESS NOTES
Daily Note     Today's date: 2019  Patient name: Cecelia Sims  : 1960  MRN: [id number]  Referring provider: Uli Bruner PA-C  Dx:   Encounter Diagnosis     ICD-10-CM    1  S/P ORIF (open reduction internal fixation) fracture Z96 7     Z87 81    2  Status post surgery Z98 890    3  Acute right ankle pain M25 571    4  Acute bilateral low back pain without sciatica M54 5                   Subjective: Pt is interested in returning to work in the next month, and has noticed that she is walking better w/ less pain overall  Objective: See treatment diary below      Assessment: Tolerated treatment well  Patient demonstrated fatigue post treatment and exhibited good technique with therapeutic exercises and was able to tolerate TG today single leg w/ HR  Plan: Continue per plan of care           Precautions: 2 unit max, WBAT RLE, T12-L3 compression fx, rib fx, sternum fx on      Daily Treatment Diary       Manuals    Ankle PROM 10' 10' 5' 5'  10' 10'   Hip B abd mobs grade 3   5' 5'      Ankle midfoot inversion glides grade 3 (limit navicular drop)   5' 5'                          Exercise Diary                    Squats  4' Trial squats nv  20x      Soleus/gastroc Slant board  30"x2 "30 x3 4x20" 4x20   5x20" 5x20"             Seated pball flex   nv 10x5"      SLB   :03 x10 10x5"      SLR  3x10  10x painful 2x10  AROM 2x7 2x10   Seated piriformis ST   R 3x :20 R 10x10"      Standing wobble bds CC/CCW a/p 20x 20x 20x np  np 20x                       Step ups 6 inch FF 1 HR and SPC FF 10 steps np  FF 1 HR FF HR L up and R down   YTB resisted DF and eversion  2x20 2x20 HEP                Bike  6' 6' 6' 6'  6' 6'   Treadmill if bob    6'      HR/TR  2x10 2x10 2x20      LP #45 SL HR  2x20  50# LLE       TG LVL 15    SL w/ HR 20x

## 2019-01-11 ENCOUNTER — OFFICE VISIT (OUTPATIENT)
Dept: PHYSICAL THERAPY | Facility: CLINIC | Age: 59
End: 2019-01-11
Payer: COMMERCIAL

## 2019-01-11 DIAGNOSIS — Z98.890 S/P ORIF (OPEN REDUCTION INTERNAL FIXATION) FRACTURE: Primary | ICD-10-CM

## 2019-01-11 DIAGNOSIS — Z98.890 STATUS POST SURGERY: ICD-10-CM

## 2019-01-11 DIAGNOSIS — Z87.81 S/P ORIF (OPEN REDUCTION INTERNAL FIXATION) FRACTURE: Primary | ICD-10-CM

## 2019-01-11 DIAGNOSIS — M25.571 ACUTE RIGHT ANKLE PAIN: ICD-10-CM

## 2019-01-11 DIAGNOSIS — M54.50 ACUTE BILATERAL LOW BACK PAIN WITHOUT SCIATICA: ICD-10-CM

## 2019-01-11 PROCEDURE — 97110 THERAPEUTIC EXERCISES: CPT | Performed by: PHYSICAL THERAPIST

## 2019-01-11 PROCEDURE — 97140 MANUAL THERAPY 1/> REGIONS: CPT | Performed by: PHYSICAL THERAPIST

## 2019-01-11 NOTE — PROGRESS NOTES
Daily Note     Today's date: 2019  Patient name: Larry Mauricio  : 1960  MRN: [id number]  Referring provider: Cindy Acevedo PA-C  Dx:   Encounter Diagnosis     ICD-10-CM    1  S/P ORIF (open reduction internal fixation) fracture Z96 7     Z87 81    2  Status post surgery Z98 890    3  Acute right ankle pain M25 571    4  Acute bilateral low back pain without sciatica M54 5                   Subjective: states that she is feeling better today and believes she will be returning to 4 hour work days soon  Objective: See treatment diary below  Precautions: 2 unit max, WBAT RLE, T12-L3 compression fx, rib fx, sternum fx on      Daily Treatment Diary       Manuals    Ankle PROM 10' 10' 5' 5' 6' focus on soleus 10' 10'   Hip B abd mobs grade 3   5' 5' 5'     Ankle midfoot inversion glides grade 3 (limit navicular drop)   5' 5' 5'                         Exercise Diary                    Squats  4' Trial squats nv  20x 20x focus on heel flat     Soleus/gastroc Slant board  30"x2 "30 x3 4x20" 4x20  :20 x 4 5x20" 5x20"             Seated pball flex   nv 10x5" 5" x 10      SLB   :03 x10 10x5" 10x5"     SLR  3x10  10x painful 2x10 np AROM 2x7 2x10   Seated piriformis ST   R 3x :20 R 10x10" nv     Standing wobble bds CC/CCW a/p 20x 20x 20x np  np 20x                       Step ups 6 inch FF 1 HR and SPC FF 10 steps np  FF 1 HR FF HR L up and R down   YTB resisted DF and eversion  2x20 2x20 HEP                Bike  6' 6' 6' 6' 6 6' 6'   Treadmill if bob    6' 6 min     HR/TR  2x10 2x10 2x20 2x20     LP #45 SL HR  2x20  50# LLE       TG LVL 15    SL w/ HR 20x np                                                           Assessment: Tolerated treatment well  Patient exhibited good technique with therapeutic exercises, she demonstrated heel lift during squats today and demonstrated gross restriction of knee flexion DF  Soleus PROM was focused today         Plan: Continue per plan of care
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
## 2019-01-14 ENCOUNTER — OFFICE VISIT (OUTPATIENT)
Dept: PHYSICAL THERAPY | Facility: CLINIC | Age: 59
End: 2019-01-14
Payer: COMMERCIAL

## 2019-01-14 DIAGNOSIS — Z98.890 STATUS POST SURGERY: ICD-10-CM

## 2019-01-14 DIAGNOSIS — M25.571 ACUTE RIGHT ANKLE PAIN: ICD-10-CM

## 2019-01-14 DIAGNOSIS — Z87.81 S/P ORIF (OPEN REDUCTION INTERNAL FIXATION) FRACTURE: Primary | ICD-10-CM

## 2019-01-14 DIAGNOSIS — Z98.890 S/P ORIF (OPEN REDUCTION INTERNAL FIXATION) FRACTURE: Primary | ICD-10-CM

## 2019-01-14 PROCEDURE — 97110 THERAPEUTIC EXERCISES: CPT

## 2019-01-14 PROCEDURE — 97140 MANUAL THERAPY 1/> REGIONS: CPT

## 2019-01-14 NOTE — PROGRESS NOTES
Daily Note     Today's date: 2019  Patient name: Georgi Kay  : 1960  MRN: [id number]  Referring provider: Squire Severs, PA-C  Dx:   Encounter Diagnosis     ICD-10-CM    1  S/P ORIF (open reduction internal fixation) fracture Z96 7     Z87 81    2  Status post surgery Z98 890    3  Acute right ankle pain M25 571                   Subjective: Patient continues to have a difficult time walking for extended periods of time but is trying to be more active at home with household activities  Objective: See treatment diary below  Precautions: 2 unit max, WBAT RLE, T12-L3 compression fx, rib fx, sternum fx on      Daily Treatment Diary       Manuals     Ankle PROM 10' 10' 5' 5' 6' focus on soleus 5    Hip B abd mobs grade 3   5' 5' 5' 5- WA    Ankle midfoot inversion glides grade 3 (limit navicular drop)   5' 5' 5' 5- WA                        Exercise Diary                    Squats  4' Trial squats nv  20x 20x focus on heel flat 20 x    Soleus/gastroc Slant board  30"x2 "30 x3 4x20" 4x20  :20 x 4 :20 x 4               Seated pball flex   nv 10x5" 5" x 10  5' x 10    SLB   :03 x10 10x5" 10x5" :10x5    SLR  3x10  10x painful 2x10 np     Seated piriformis ST   R 3x :20 R 10x10" nv     Standing wobble bds CC/CCW a/p 20x 20x 20x np                          Step ups 6 inch FF 1 HR and SPC FF 10 steps np  HEP    YTB resisted DF and eversion  2x20 2x20 HEP  HEP              Bike  6' 6' 6' 6' 6 6    Treadmill if bob    6' 6 min 6min    HR/TR  2x10 2x10 2x20 2x20 2x20    LP #45 SL HR  2x20  50# LLE   45# x20    TG LVL 15    SL w/ HR 20x np SL lv 20 20x    Bridges      x20                                                   Assessment: Tolerated treatment fair  Patient exhibited good technique with therapeutic exercises      Plan: Continue per plan of care

## 2019-01-16 ENCOUNTER — OFFICE VISIT (OUTPATIENT)
Dept: PHYSICAL THERAPY | Facility: CLINIC | Age: 59
End: 2019-01-16
Payer: COMMERCIAL

## 2019-01-16 DIAGNOSIS — M25.571 ACUTE RIGHT ANKLE PAIN: ICD-10-CM

## 2019-01-16 DIAGNOSIS — Z87.81 S/P ORIF (OPEN REDUCTION INTERNAL FIXATION) FRACTURE: Primary | ICD-10-CM

## 2019-01-16 DIAGNOSIS — M54.50 ACUTE BILATERAL LOW BACK PAIN WITHOUT SCIATICA: ICD-10-CM

## 2019-01-16 DIAGNOSIS — Z98.890 STATUS POST SURGERY: ICD-10-CM

## 2019-01-16 DIAGNOSIS — Z98.890 S/P ORIF (OPEN REDUCTION INTERNAL FIXATION) FRACTURE: Primary | ICD-10-CM

## 2019-01-16 PROCEDURE — 97110 THERAPEUTIC EXERCISES: CPT | Performed by: PHYSICAL THERAPIST

## 2019-01-16 NOTE — PROGRESS NOTES
Daily Note     Today's date: 2019  Patient name: Yonathan Sorenson  : 1960  MRN: [id number]  Referring provider: Chris Abreu PA-C  Dx:   Encounter Diagnosis     ICD-10-CM    1  S/P ORIF (open reduction internal fixation) fracture Z96 7     Z87 81    2  Acute right ankle pain M25 571    3  Acute bilateral low back pain without sciatica M54 5    4  Status post surgery Z98 890                   Subjective: Patient states she wants to return to work but if she is not able to she would want to return to other work at home       Objective: See treatment diary below R DF in squat 5 deg  L squat 12 deg  Squat 70 deg  Precautions: 2 unit max, WBAT RLE, T12-L3 compression fx, rib fx, sternum fx on      Daily Treatment Diary       Manuals    Ankle soleus stretching 10' 10' 5' 5' 6' focus on soleus 5 5'    Stirrup taping leukotape medial to lateral       2'   Ankle midfoot inversion glides grade 3 (limit navicular drop)   5' 5' 5' 5- WA    PA lumbar grade 1-2 mobs       4'   MWM R tibial IR c squat       10x   Exercise Diary                    Squats  4' Trial squats nv  20x 20x focus on heel flat 20 x 20x   Soleus/gastroc Slant board  30"x2 "30 x3 4x20" 4x20  :20 x 4 :20 x 4  :20 x 4   Self        5"x10   Seated pball flex   nv 10x5" 5" x 10  5' x 10 10x   SLB   :03 x10 10x5" 10x5" :10x5 :05 x 10   SLR  3x10  10x painful 2x10 np     Seated piriformis ST   R 3x :20 R 10x10" nv     Standing wobble bds CC/CCW a/p 20x 20x 20x np                          Step downs 4 inch FF 1 HR and SPC FF 10 steps np  HEP 20x   YTB resisted DF and eversion  2x20 2x20 HEP  HEP              Bike  6' 6' 6' 6' 6 6    Treadmill if bob    6' 6 min 6min 6'   HR/TR  2x10 2x10 2x20 2x20 2x20 2x20   LP #65 and HR SL #50       2x20  50# LLE   45# x20 x20/x20   TG LVL 15    SL w/ HR 20x np SL lv 20 20x    Bridges      x20      #10 goblet squat nv       nv                                       Assessment: Tolerated treatment fair  Patient exhibited good technique with therapeutic exercises  Had improved squatting tolerance after tibial IR MWM  Continue to work on strength and eccentric load with stretching soleus  Plan: Continue per plan of care

## 2019-01-23 ENCOUNTER — OFFICE VISIT (OUTPATIENT)
Dept: PHYSICAL THERAPY | Facility: CLINIC | Age: 59
End: 2019-01-23
Payer: COMMERCIAL

## 2019-01-23 DIAGNOSIS — M25.571 ACUTE RIGHT ANKLE PAIN: ICD-10-CM

## 2019-01-23 DIAGNOSIS — Z98.890 STATUS POST SURGERY: ICD-10-CM

## 2019-01-23 DIAGNOSIS — Z98.890 S/P ORIF (OPEN REDUCTION INTERNAL FIXATION) FRACTURE: Primary | ICD-10-CM

## 2019-01-23 DIAGNOSIS — M54.50 ACUTE BILATERAL LOW BACK PAIN WITHOUT SCIATICA: ICD-10-CM

## 2019-01-23 DIAGNOSIS — Z87.81 S/P ORIF (OPEN REDUCTION INTERNAL FIXATION) FRACTURE: Primary | ICD-10-CM

## 2019-01-23 PROCEDURE — 97110 THERAPEUTIC EXERCISES: CPT

## 2019-01-23 PROCEDURE — 97140 MANUAL THERAPY 1/> REGIONS: CPT

## 2019-01-23 NOTE — PROGRESS NOTES
Daily Note     Today's date: 2019  Patient name: Ladarius Swartz  : 1960  MRN: [id number]  Referring provider: Cory Zurita PA-C  Dx:   Encounter Diagnosis     ICD-10-CM    1  S/P ORIF (open reduction internal fixation) fracture Z96 7     Z87 81    2  Acute right ankle pain M25 571    3  Acute bilateral low back pain without sciatica M54 5    4  Status post surgery Z98 890                   Subjective: Patient states she experiences LBP when flexed forward for more than 10 minutes to clean dishes or cook and when walking for longer than 1 hour  Objective: See treatment diary below  Precautions: 2 unit max, WBAT RLE, T12-L3 compression fx, rib fx, sternum fx on      Daily Treatment Diary       Manuals    Ankle soleus stretching 8   5' 6' focus on soleus 5 5'    Stirrup taping leukotape medial to lateral       2'   Ankle midfoot inversion glides grade 3 (limit navicular drop)    5' 5' 5- WA    PA lumbar grade 1-2 mobs       4'   MWM R tibial IR c squat       10x   Exercise Diary                    Squats  20x   20x 20x focus on heel flat 20 x 20x   Soleus/gastroc Slant board  :20x4   4x20  :20 x 4 :20 x 4  :20 x 4   Self        5"x10   Seated pball flex :10x10    10x5" 5" x 10  5' x 10 10x   SLB :10x10    10x5" 10x5" :10x5 :05 x 10   SLR     2x10 np     Seated piriformis ST    R 10x10" nv     Standing wobble bds CC/CCW a/p    np                          Step up and over  4" x 15    np  HEP 20x   YTB resisted DF and eversion    HEP  HEP              Bike  6   6' 6 6    Treadmill if bob 6   6' 6 min 6min 6'   HR/TR 2 x 20    2x20 2x20 2x20 2x20   LP #65 and HR SL #50      DL-80#/SL-50# x 20 ea   50# LLE   45# x20 x20/x20   TG LVL 15    SL w/ HR 20x np SL lv 20 20x    Bridges      x20      #10 goblet squat nv 10# x 15      nv                                     Assessment: Tolerated treatment fair   Patient exhibited good technique with therapeutic exercises      Plan: Continue per plan of care

## 2019-01-25 ENCOUNTER — OFFICE VISIT (OUTPATIENT)
Dept: PHYSICAL THERAPY | Facility: CLINIC | Age: 59
End: 2019-01-25
Payer: COMMERCIAL

## 2019-01-25 DIAGNOSIS — Z98.890 S/P ORIF (OPEN REDUCTION INTERNAL FIXATION) FRACTURE: Primary | ICD-10-CM

## 2019-01-25 DIAGNOSIS — M54.50 ACUTE BILATERAL LOW BACK PAIN WITHOUT SCIATICA: ICD-10-CM

## 2019-01-25 DIAGNOSIS — Z87.81 S/P ORIF (OPEN REDUCTION INTERNAL FIXATION) FRACTURE: Primary | ICD-10-CM

## 2019-01-25 DIAGNOSIS — M25.571 ACUTE RIGHT ANKLE PAIN: ICD-10-CM

## 2019-01-25 PROCEDURE — 97110 THERAPEUTIC EXERCISES: CPT

## 2019-01-25 PROCEDURE — 97140 MANUAL THERAPY 1/> REGIONS: CPT

## 2019-01-25 PROCEDURE — 97112 NEUROMUSCULAR REEDUCATION: CPT

## 2019-01-25 NOTE — PROGRESS NOTES
Daily Note     Today's date: 2019  Patient name: Jacques Cruz  : 1960  MRN: [id number]  Referring provider: Cinthia Barba PA-C  Dx:   Encounter Diagnosis     ICD-10-CM    1  S/P ORIF (open reduction internal fixation) fracture Z96 7     Z87 81    2  Acute right ankle pain M25 571    3  Acute bilateral low back pain without sciatica M54 5                   Subjective: Patient states that she felt an increase in R ankle pain following last treatment session however symptoms dissipated the day after  No complaints of increased LBP       Objective: See treatment diary below  Precautions: 2 unit max, WBAT RLE, T12-L3 compression fx, rib fx, sternum fx on      Daily Treatment Diary       Manuals    Ankle soleus stretching 8 5  5' 6' focus on soleus 5 5'    Stirrup taping leukotape medial to lateral  3     2'   Ankle midfoot inversion glides grade 3 (limit navicular drop)    5' 5' 5- WA    PA lumbar grade 1-2 mobs       4'   MWM R tibial IR c squat       10x   Exercise Diary                    Squats  20x 20x  20x 20x focus on heel flat 20 x 20x   Soleus/gastroc Slant board  :20x4 :20x4  4x20  :20 x 4 :20 x 4  :20 x 4   Self        5"x10   Seated pball flex :10x10  :10x10   10x5" 5" x 10  5' x 10 10x   SLB :10x10  :10x10  10x5" 10x5" :10x5 :05 x 10   SLR   2x10  2x10 np     Seated piriformis ST    R 10x10" nv     Standing wobble bds CC/CCW a/p    np                          Step up and over  4" x 15  4" x 15   np  HEP 20x   YTB resisted DF and eversion    HEP  HEP              Bike  6 6  6' 6 6    Treadmill if bob 6 6  6' 6 min 6min 6'   HR/TR 2 x 20  2x10  2x20 2x20 2x20 2x20   LP #65 and HR SL #50      DL-80#/SL-50# x 20 ea DL-80SL50  x20ea  50# LLE   45# x20 x20/x20   TG LVL 15    SL w/ HR 20x np SL lv 20 20x    Bridges      x20      #10 goblet squat nv 10# x 15 10# x 15      nv                                     Assessment: Tolerated treatment fair   Patient exhibited good technique with therapeutic exercises      Plan: Continue per plan of care

## 2019-01-28 ENCOUNTER — OFFICE VISIT (OUTPATIENT)
Dept: PHYSICAL THERAPY | Facility: CLINIC | Age: 59
End: 2019-01-28
Payer: COMMERCIAL

## 2019-01-28 DIAGNOSIS — Z98.890 S/P ORIF (OPEN REDUCTION INTERNAL FIXATION) FRACTURE: Primary | ICD-10-CM

## 2019-01-28 DIAGNOSIS — Z98.890 STATUS POST SURGERY: ICD-10-CM

## 2019-01-28 DIAGNOSIS — Z87.81 S/P ORIF (OPEN REDUCTION INTERNAL FIXATION) FRACTURE: Primary | ICD-10-CM

## 2019-01-28 DIAGNOSIS — M54.50 ACUTE BILATERAL LOW BACK PAIN WITHOUT SCIATICA: ICD-10-CM

## 2019-01-28 DIAGNOSIS — M25.571 ACUTE RIGHT ANKLE PAIN: ICD-10-CM

## 2019-01-28 PROCEDURE — 97110 THERAPEUTIC EXERCISES: CPT

## 2019-01-28 NOTE — PROGRESS NOTES
Daily Note     Today's date: 2019  Patient name: Tram Meyer  : 1960  MRN: [id number]  Referring provider: Prakash Blas PA-C  Dx:   Encounter Diagnosis     ICD-10-CM    1  S/P ORIF (open reduction internal fixation) fracture Z96 7     Z87 81    2  Acute right ankle pain M25 571    3  Acute bilateral low back pain without sciatica M54 5    4  Status post surgery Z98 890                   Subjective: Patient denies an increase in ankle pain following her last treatment session but has some skin irritation with taping  Will resume at NV  Leta Ard No complaints of LBP to begin todays treatment      Objective: See treatment diary below  Precautions: 2 unit max, WBAT RLE, T12-L3 compression fx, rib fx, sternum fx on      Daily Treatment Diary       Manuals    Ankle soleus stretching 8 5    5 5'    Stirrup taping leukotape medial to lateral  3 Skin irritation  2'    Ankle midfoot inversion glides grade 3 (limit navicular drop)      5- WA    IASTM/ (-) MFR soleus    10     4'   MWM R tibial IR c squat 10x  10x- WA    10x   Exercise Diary                    Squats  20x 20x    20 x 20x   Soleus/gastroc Slant board  :20x4 :20x4 :20x4   :20 x 4  :20 x 4   Self        5"x10   Seated pball flex :10x10  :10x10  :10x10   5' x 10 10x   SLB :10x10  :10x10    :10x5 :05 x 10   SLR   2x10        Seated piriformis ST          Standing wobble bds CC/CCW a/p                              Step up and over  4" x 15  4" x 15  4" x 15    HEP 20x   YTB resisted DF and eversion      HEP              Bike  6 6 6   6    Treadmill if bob 6 6 6   6min 6'   HR/TR 2 x 20  2x10 2x10   2x20 2x20   LP #65 and HR SL #50      DL-80#/SL-50# x 20 ea DL-80SL50  x20ea DL-95SL50 x 20   45# x20 x20/x20   TG LVL 15      SL lv 20 20x    Bridges      x20      #10 goblet squat nv 10# x 15 10# x 15  10# x 15    nv                                   Assessment: Tolerated treatment fair   Patient exhibited good technique with therapeutic exercises      Plan: Progress note during next visit

## 2019-01-30 ENCOUNTER — OFFICE VISIT (OUTPATIENT)
Dept: PHYSICAL THERAPY | Facility: CLINIC | Age: 59
End: 2019-01-30
Payer: COMMERCIAL

## 2019-01-30 DIAGNOSIS — Z87.81 S/P ORIF (OPEN REDUCTION INTERNAL FIXATION) FRACTURE: Primary | ICD-10-CM

## 2019-01-30 DIAGNOSIS — Z98.890 STATUS POST SURGERY: ICD-10-CM

## 2019-01-30 DIAGNOSIS — M54.50 ACUTE BILATERAL LOW BACK PAIN WITHOUT SCIATICA: ICD-10-CM

## 2019-01-30 DIAGNOSIS — Z98.890 S/P ORIF (OPEN REDUCTION INTERNAL FIXATION) FRACTURE: Primary | ICD-10-CM

## 2019-01-30 DIAGNOSIS — M25.571 ACUTE RIGHT ANKLE PAIN: ICD-10-CM

## 2019-01-30 PROCEDURE — 97140 MANUAL THERAPY 1/> REGIONS: CPT

## 2019-01-30 NOTE — PROGRESS NOTES
Daily Note     Today's date: 2019  Patient name: Gabe Garcia  : 1960  MRN: [id number]  Referring provider: Alana Goodpasture, PA-C  Dx:   Encounter Diagnosis     ICD-10-CM    1  S/P ORIF (open reduction internal fixation) fracture Z96 7     Z87 81    2  Acute right ankle pain M25 571    3  Acute bilateral low back pain without sciatica M54 5    4  Status post surgery Z98 890        Start Time: 1115  Stop Time: 1200  Total time in clinic (min): 45 minutes    Subjective: Patient reports that she felt better post -MFD to R soleus  Objective: See treatment diary below  Precautions: 2 unit max, WBAT RLE, T12-L3 compression fx, rib fx, sternum fx on      Daily Treatment Diary       Manuals    Ankle soleus stretching 8 5     5'    Stirrup taping leukotape medial to lateral  3 Skin irritation  3'   2'    Ankle midfoot inversion glides grade 3 (limit navicular drop)          IASTM/ (-) MFR soleus  R   10  10'   4'   MWM R tibial IR c squat 10x  10x- WA 10x-WA   10x   Exercise Diary                    Squats  20x 20x     20x   Soleus/gastroc Slant board  :20x4 :20x4 :20x4 :20x4   :20 x 4   Self        5"x10   Seated pball flex :10x10  :10x10  :10x10 :10x10   10x   SLB :10x10  :10x10     :05 x 10   SLR   2x10  2x10      Seated piriformis ST          Standing wobble bds CC/CCW a/p                              Step up and over  4" x 15  4" x 15  4" x 15  6"  10x   20x   YTB resisted DF and eversion                    Bike  6 6 6 4'      Treadmill if bob 6 6 6 4'   6'   HR/TR 2 x 20  2x10 2x10 2x10    2x20   LP #65 and HR SL #50      DL-80#/SL-50# x 20 ea DL-80SL50  x20ea DL-95SL50 x 20 DL-95  SL 50  x25   x20/x20   TG LVL 15          Bridges           #10 goblet squat nv 10# x 15 10# x 15  10# x 15 10#  15   nv                                   Assessment: Tolerated treatment fair  VC to decrease hip compensation during step ups/downs   Patient needed close supervision to ensure correct form with most interventions listed above  Pt ambulates with decreased knee flexion on R LE, able to correct with VC  Plan: Progress note during next visit

## 2019-02-06 ENCOUNTER — EVALUATION (OUTPATIENT)
Dept: PHYSICAL THERAPY | Facility: CLINIC | Age: 59
End: 2019-02-06
Payer: COMMERCIAL

## 2019-02-06 DIAGNOSIS — M25.571 ACUTE RIGHT ANKLE PAIN: ICD-10-CM

## 2019-02-06 DIAGNOSIS — Z98.890 STATUS POST SURGERY: ICD-10-CM

## 2019-02-06 DIAGNOSIS — M54.50 ACUTE BILATERAL LOW BACK PAIN WITHOUT SCIATICA: ICD-10-CM

## 2019-02-06 DIAGNOSIS — Z87.81 S/P ORIF (OPEN REDUCTION INTERNAL FIXATION) FRACTURE: Primary | ICD-10-CM

## 2019-02-06 DIAGNOSIS — Z98.890 S/P ORIF (OPEN REDUCTION INTERNAL FIXATION) FRACTURE: Primary | ICD-10-CM

## 2019-02-06 PROCEDURE — 97110 THERAPEUTIC EXERCISES: CPT | Performed by: PHYSICAL THERAPIST
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
## 2019-02-06 NOTE — PROGRESS NOTES
Daily Note     Today's date: 2019  Patient name: Beth Rodriguez  : 1960  MRN: [id number]  Referring provider: Germaine Glynn PA-C  Dx:   Encounter Diagnosis     ICD-10-CM    1  S/P ORIF (open reduction internal fixation) fracture Z96 7     Z87 81    2  Acute right ankle pain M25 571    3  Acute bilateral low back pain without sciatica M54 5    4  Status post surgery Z98 890                   Subjective: Refer to RE  Objective: See treatment diary below  Precautions: 2 unit max, WBAT RLE, T12-L3 compression fx, rib fx, sternum fx on      Daily Treatment Diary       Manuals    Ankle soleus stretching 8 5   3'  5'    Stirrup taping leukotape medial to lateral  3 Skin irritation  3'   2'    Ankle midfoot inversion glides grade 3 (limit navicular drop)          IASTM/ (-) MFR soleus  R   10  10' -MFR to parspinals   4'   MWM R tibial IR c squat 10x  10x- WA 10x-WA 10x  10x   Exercise Diary                    Squats  20x 20x   20x  20x   Soleus/gastroc Slant board  :20x4 :20x4 :20x4 :20x4 20x4  :20 x 4   Self        5"x10   Seated pball flex :10x10  :10x10  :10x10 :10x10 10x10  10x   SLB :10x10  :10x10     :05 x 10   SLR   2x10  2x10 2x10     LTR     10x5"      Standing wobble bds CC/CCW a/p                              Step up and over  4" x 15  4" x 15  4" x 15  6"  10x   20x   YTB resisted DF and eversion          Thoracic extension over chair     10x5"     Bike  6 6 6 4' 6'     Treadmill if bob 6 6 6 4'   6'   HR/TR 2 x 20  2x10 2x10 2x10  10x SL  2x20   LP #65 and HR SL #50      DL-80#/SL-50# x 20 ea DL-80SL50  x20ea DL-95SL50 x 20 DL-95  SL 50  x25   SL 60 x25  x20/x20   Box lift      nv     Bridges           #10 goblet squat nv 10# x 15 10# x 15  10# x 15 10#  15   nv                                   Assessment: Tolerated treatment fair  Pt had min increase in back pain during squats and rotational movements   Continue with goblet squats and progress to other lifting interventions  Plan: Progress note during next visit

## 2019-02-13 ENCOUNTER — OFFICE VISIT (OUTPATIENT)
Dept: PHYSICAL THERAPY | Facility: CLINIC | Age: 59
End: 2019-02-13
Payer: COMMERCIAL

## 2019-02-13 DIAGNOSIS — M54.50 ACUTE BILATERAL LOW BACK PAIN WITHOUT SCIATICA: ICD-10-CM

## 2019-02-13 DIAGNOSIS — M25.571 ACUTE RIGHT ANKLE PAIN: ICD-10-CM

## 2019-02-13 DIAGNOSIS — Z98.890 STATUS POST SURGERY: ICD-10-CM

## 2019-02-13 DIAGNOSIS — Z98.890 S/P ORIF (OPEN REDUCTION INTERNAL FIXATION) FRACTURE: Primary | ICD-10-CM

## 2019-02-13 DIAGNOSIS — Z87.81 S/P ORIF (OPEN REDUCTION INTERNAL FIXATION) FRACTURE: Primary | ICD-10-CM

## 2019-02-13 PROCEDURE — 97110 THERAPEUTIC EXERCISES: CPT | Performed by: PHYSICAL THERAPIST

## 2019-02-13 NOTE — PROGRESS NOTES
Daily Note     Today's date: 2019  Patient name: Emiliano Ormond  : 1960  MRN: [id number]  Referring provider: Sujey Tim PA-C  Dx:   Encounter Diagnosis     ICD-10-CM    1  S/P ORIF (open reduction internal fixation) fracture Z96 7     Z87 81    2  Acute right ankle pain M25 571    3  Acute bilateral low back pain without sciatica M54 5    4  Status post surgery Z98 890                   Subjective: She states she still has stiffness when first getting up and has to stand still a while before walking  Objective: See treatment diary below  Precautions: 2 unit max, WBAT RLE, T12-L3 compression fx, rib fx, sternum fx on      Daily Treatment Diary       Manuals    Ankle soleus stretching 8 5   3' 3' 5'    Stirrup taping leukotape medial to lateral  3 Skin irritation   3'   2'    Ankle midfoot inversion glides grade 3 (limit navicular drop)      3'    IASTM/ (-) MFR soleus  R   10  10' -MFR to parspinals  5' 4'   MWM R tibial IR c squat 10x  10x- WA 10x-WA 10x  10x   Exercise Diary          UE/LE alternating lifts          Squats  20x 20x   20x  20x   Soleus/gastroc Slant board  :20x4 :20x4 :20x4 :20x4 20x4 :20x4 :20 x 4   Self        5"x10             SLB :10x10  :10x10     :05 x 10   SLR   2x10  2x10 2x10 2x10 #1 5    LTR     10x5"      Standing wobble bds CC/CCW a/p          Kneel on foam nv      nv    Planks on knees      :15 x7    Step up and over  4" x 15  4" x 15  4" x 15  6"  10x   20x   RTB resisted DF and eversion      20x 2    Thoracic extension over chair     10x5"     Bike  6 6 6 4' 6' 4'- hold nv    Treadmill if bob 6 6 6 4'  7' 6'   HR/TR 2 x 20  2x10 2x10 2x10  10x SL DL 20x 2x20   LP #65 and HR SL #50      DL-80#/SL-50# x 20 ea DL-80SL50  x20ea DL-95SL50 x 20 DL-95  SL 50  x25   SL 60 x25 # SL 60 x25 x20/x20   Box lift      nv nv    Bridges      20x     #10 goblet squat nv 10# x 15 10# x 15  10# x 15 10#  15   nv Assessment: Tolerated treatment fair  Had some pain c planks so performed on knees  Will trial knees on foam nv for tolerance  SLR was difficult so continue to work on this  Plan: Progress note during next visit

## 2019-02-18 DIAGNOSIS — E11.9 TYPE 2 DIABETES MELLITUS WITHOUT COMPLICATION, WITHOUT LONG-TERM CURRENT USE OF INSULIN (HCC): ICD-10-CM

## 2019-02-18 DIAGNOSIS — I10 ESSENTIAL HYPERTENSION: ICD-10-CM

## 2019-02-18 DIAGNOSIS — E78.2 MIXED HYPERLIPIDEMIA: ICD-10-CM

## 2019-02-18 RX ORDER — LISINOPRIL 10 MG/1
10 TABLET ORAL DAILY
Qty: 90 TABLET | Refills: 0 | Status: SHIPPED | OUTPATIENT
Start: 2019-02-18 | End: 2019-03-06

## 2019-02-18 RX ORDER — SIMVASTATIN 10 MG
10 TABLET ORAL
Qty: 90 TABLET | Refills: 0 | Status: SHIPPED | OUTPATIENT
Start: 2019-02-18 | End: 2019-05-22 | Stop reason: SDUPTHER

## 2019-02-18 RX ORDER — LISINOPRIL 10 MG/1
10 TABLET ORAL DAILY
Qty: 90 TABLET | Refills: 0 | Status: CANCELLED | OUTPATIENT
Start: 2019-02-18 | End: 2019-05-19

## 2019-02-18 RX ORDER — SIMVASTATIN 10 MG
10 TABLET ORAL
Qty: 90 TABLET | Refills: 0 | Status: CANCELLED | OUTPATIENT
Start: 2019-02-18 | End: 2019-05-19

## 2019-02-18 RX ORDER — GLIMEPIRIDE 4 MG/1
4 TABLET ORAL
Qty: 90 TABLET | Refills: 0 | Status: CANCELLED | OUTPATIENT
Start: 2019-02-18 | End: 2019-05-19

## 2019-02-18 RX ORDER — GLIMEPIRIDE 4 MG/1
4 TABLET ORAL
Qty: 90 TABLET | Refills: 0 | Status: SHIPPED | OUTPATIENT
Start: 2019-02-18 | End: 2019-06-19 | Stop reason: SDUPTHER

## 2019-02-20 ENCOUNTER — APPOINTMENT (OUTPATIENT)
Dept: PHYSICAL THERAPY | Facility: CLINIC | Age: 59
End: 2019-02-20
Payer: COMMERCIAL

## 2019-02-21 ENCOUNTER — APPOINTMENT (OUTPATIENT)
Dept: PHYSICAL THERAPY | Facility: CLINIC | Age: 59
End: 2019-02-21
Payer: COMMERCIAL

## 2019-03-05 PROBLEM — E11.42 TYPE 2 DIABETES MELLITUS WITH DIABETIC POLYNEUROPATHY, WITHOUT LONG-TERM CURRENT USE OF INSULIN (HCC): Chronic | Status: ACTIVE | Noted: 2018-03-13

## 2019-03-05 PROBLEM — I10 ESSENTIAL HYPERTENSION: Chronic | Status: ACTIVE | Noted: 2018-10-03

## 2019-03-05 PROBLEM — E11.9 DIABETES MELLITUS (HCC): Status: RESOLVED | Noted: 2018-10-01 | Resolved: 2019-03-05

## 2019-03-05 PROBLEM — I60.9 SUBARACHNOID BLEED (HCC): Status: RESOLVED | Noted: 2018-09-28 | Resolved: 2019-03-05

## 2019-03-05 PROBLEM — R92.8 ABNORMAL MAMMOGRAM OF RIGHT BREAST: Status: ACTIVE | Noted: 2019-03-05

## 2019-03-05 PROBLEM — S22.42XA CLOSED FRACTURE OF MULTIPLE RIBS OF LEFT SIDE: Status: RESOLVED | Noted: 2018-09-28 | Resolved: 2019-03-05

## 2019-03-05 PROBLEM — S06.5X9A SUBDURAL HEMATOMA (HCC): Status: RESOLVED | Noted: 2018-09-28 | Resolved: 2019-03-05

## 2019-03-06 ENCOUNTER — OFFICE VISIT (OUTPATIENT)
Dept: INTERNAL MEDICINE CLINIC | Facility: CLINIC | Age: 59
End: 2019-03-06

## 2019-03-06 VITALS
HEART RATE: 72 BPM | SYSTOLIC BLOOD PRESSURE: 130 MMHG | HEIGHT: 61 IN | DIASTOLIC BLOOD PRESSURE: 90 MMHG | WEIGHT: 169.31 LBS | BODY MASS INDEX: 31.97 KG/M2 | TEMPERATURE: 97.7 F

## 2019-03-06 DIAGNOSIS — Z59.89 INSURANCE COVERAGE PROBLEMS: ICD-10-CM

## 2019-03-06 DIAGNOSIS — E66.9 OBESITY (BMI 30.0-34.9): ICD-10-CM

## 2019-03-06 DIAGNOSIS — E11.42 TYPE 2 DIABETES MELLITUS WITH DIABETIC POLYNEUROPATHY, WITHOUT LONG-TERM CURRENT USE OF INSULIN (HCC): Primary | Chronic | ICD-10-CM

## 2019-03-06 DIAGNOSIS — R92.8 ABNORMAL MAMMOGRAM OF RIGHT BREAST: ICD-10-CM

## 2019-03-06 DIAGNOSIS — Z12.31 VISIT FOR SCREENING MAMMOGRAM: ICD-10-CM

## 2019-03-06 DIAGNOSIS — I10 ESSENTIAL HYPERTENSION: ICD-10-CM

## 2019-03-06 DIAGNOSIS — E78.2 MIXED HYPERLIPIDEMIA: ICD-10-CM

## 2019-03-06 DIAGNOSIS — E11.9 TYPE 2 DIABETES MELLITUS WITHOUT COMPLICATION, WITHOUT LONG-TERM CURRENT USE OF INSULIN (HCC): ICD-10-CM

## 2019-03-06 DIAGNOSIS — Z12.11 SCREENING FOR COLON CANCER: ICD-10-CM

## 2019-03-06 PROBLEM — K52.9 GASTROENTERITIS: Status: RESOLVED | Noted: 2018-03-13 | Resolved: 2019-03-06

## 2019-03-06 PROBLEM — D64.9 ANEMIA: Status: RESOLVED | Noted: 2018-09-30 | Resolved: 2019-03-06

## 2019-03-06 PROCEDURE — 99213 OFFICE O/P EST LOW 20 MIN: CPT | Performed by: INTERNAL MEDICINE

## 2019-03-06 RX ORDER — LISINOPRIL 20 MG/1
20 TABLET ORAL DAILY
Qty: 90 TABLET | Refills: 1 | Status: SHIPPED | OUTPATIENT
Start: 2019-03-06 | End: 2019-09-09 | Stop reason: SDUPTHER

## 2019-03-06 SDOH — ECONOMIC STABILITY - INCOME SECURITY: OTHER PROBLEMS RELATED TO HOUSING AND ECONOMIC CIRCUMSTANCES: Z59.89
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
## 2019-03-06 NOTE — ASSESSMENT & PLAN NOTE
As we discussed today your cholesterol profile is excellent  Please continue your simvastatin daily

## 2019-03-06 NOTE — PATIENT INSTRUCTIONS
As we reviewed today the only medication change we are making is increasing your lisinopril to a 20 mg tablet as her blood pressure is not fully controlled  We will have you meet with our  to see if you can qualify for the free medication programs  As discussed we may be able to get you on a medication called Januvia this can help get your sugar under better control as well  Continue your improve diet and your increasing exercise as this too has made a significant improvement in your sugars  Get your follow-up labs as dated in 6 months then an appointment here  Your provided with a fit test to complete in her home for colon cancer screening    I have also given you the scripts to get the follow-up ultrasound and mammogram on the right side due to your abnormal screening mammogram   I have also given you a script for the routine screening mammo on the left  We reviewed that it is very important to get this completed as the ultrasounds were due in August of 2018  Obesity   AMBULATORY CARE:   Obesity  is when your body mass index (BMI) is greater than 30  Your healthcare provider will use your height and weight to measure your BMI  The risks of obesity include  many health problems, such as injuries or physical disability  You may need tests to check for the following:  · Diabetes     · High blood pressure or high cholesterol     · Heart disease     · Gallbladder or liver disease     · Cancer of the colon, breast, prostate, liver, or kidney     · Sleep apnea     · Arthritis or gout  Seek care immediately if:   · You have a severe headache, confusion, or difficulty speaking  · You have weakness on one side of your body  · You have chest pain, sweating, or shortness of breath  Contact your healthcare provider if:   · You have symptoms of gallbladder or liver disease, such as pain in your upper abdomen  · You have knee or hip pain and discomfort while walking       · You have symptoms of diabetes, such as intense hunger and thirst, and frequent urination  · You have symptoms of sleep apnea, such as snoring or daytime sleepiness  · You have questions or concerns about your condition or care  Treatment for obesity  focuses on helping you lose weight to improve your health  Even a small decrease in BMI can reduce the risk for many health problems  Your healthcare provider will help you set a weight-loss goal   · Lifestyle changes  are the first step in treating obesity  These include making healthy food choices and getting regular physical activity  Your healthcare provider may suggest a weight-loss program that involves coaching, education, and therapy  · Medicine  may help you lose weight when it is used with a healthy diet and physical activity  · Surgery  can help you lose weight if you are very obese and have other health problems  There are several types of weight-loss surgery  Ask your healthcare provider for more information  Be successful losing weight:   · Set small, realistic goals  An example of a small goal is to walk for 20 minutes 5 days a week  Anther goal is to lose 5% of your body weight  · Tell friends, family members, and coworkers about your goals  and ask for their support  Ask a friend to lose weight with you, or join a weight-loss support group  · Identify foods or triggers that may cause you to overeat , and find ways to avoid them  Remove tempting high-calorie foods from your home and workplace  Place a bowl of fresh fruit on your kitchen counter  If stress causes you to eat, then find other ways to cope with stress  · Keep a diary to track what you eat and drink  Also write down how many minutes of physical activity you do each day  Weigh yourself once a week and record it in your diary  Eating changes: You will need to eat 500 to 1,000 fewer calories each day than you currently eat to lose 1 to 2 pounds a week   The following changes will help you cut calories:  · Eat smaller portions  Use small plates, no larger than 9 inches in diameter  Fill your plate half full of fruits and vegetables  Measure your food using measuring cups until you know what a serving size looks like  · Eat 3 meals and 1 or 2 snacks each day  Plan your meals in advance  Juliocesar Elizabeth and eat at home most of the time  Eat slowly  · Eat fruits and vegetables at every meal   They are low in calories and high in fiber, which makes you feel full  Do not add butter, margarine, or cream sauce to vegetables  Use herbs to season steamed vegetables  · Eat less fat and fewer fried foods  Eat more baked or grilled chicken and fish  These protein sources are lower in calories and fat than red meat  Limit fast food  Dress your salads with olive oil and vinegar instead of bottled dressing  · Limit the amount of sugar you eat  Do not drink sugary beverages  Limit alcohol  Activity changes:  Physical activity is good for your body in many ways  It helps you burn calories and build strong muscles  It decreases stress and depression, and improves your mood  It can also help you sleep better  Talk to your healthcare provider before you begin an exercise program   · Exercise for at least 30 minutes 5 days a week  Start slowly  Set aside time each day for physical activity that you enjoy and that is convenient for you  It is best to do both weight training and an activity that increases your heart rate, such as walking, bicycling, or swimming  · Find ways to be more active  Do yard work and housecleaning  Walk up the stairs instead of using elevators  Spend your leisure time going to events that require walking, such as outdoor festivals or fairs  This extra physical activity can help you lose weight and keep it off  Follow up with your healthcare provider as directed: You may need to meet with a dietitian  Write down your questions so you remember to ask them during your visits     © 2017 [address] [facility] Information is for End User's use only and may not be sold, redistributed or otherwise used for commercial purposes  All illustrations and images included in CareNotes® are the copyrighted property of [employer] M [employer]  or Mani Turner  The above information is an  only  It is not intended as medical advice for individual conditions or treatments  Talk to your doctor, nurse or pharmacist before following any medical regimen to see if it is safe and effective for you  Low Fat Diet   AMBULATORY CARE:   A low-fat diet  is an eating plan that is low in total fat, unhealthy fat, and cholesterol  You may need to follow a low-fat diet if you have trouble digesting or absorbing fat  You may also need to follow this diet if you have high cholesterol  You can also lower your cholesterol by increasing the amount of fiber in your diet  Soluble fiber is a type of fiber that helps to decrease cholesterol levels  Different types of fat in food:   · Limit unhealthy fats  A diet that is high in cholesterol, saturated fat, and trans fat may cause unhealthy cholesterol levels  Unhealthy cholesterol levels increase your risk of heart disease  ¨ Cholesterol:  Limit intake of cholesterol to less than 200 mg per day  Cholesterol is found in meat, eggs, and dairy  ¨ Saturated fat:  Limit saturated fat to less than 7% of your total daily calories  Ask your dietitian how many calories you need each day  Saturated fat is found in butter, cheese, ice cream, whole milk, and palm oil  Saturated fat is also found in meat, such as beef, pork, chicken skin, and processed meats  Processed meats include sausage, hot dogs, and bologna  ¨ Trans fat:  Avoid trans fat as much as possible  Trans fat is used in fried and baked foods  Foods that say trans fat free on the label may still have up to 0 5 grams of trans fat per serving  · Include healthy fats    Replace foods that are high in saturated and trans fat with foods high in healthy fats  This may help to decrease high cholesterol levels  ¨ Monounsaturated fats: These are found in avocados, nuts, and vegetable oils, such as olive, canola, and sunflower oil  ¨ Polyunsaturated fats: These can be found in vegetable oils, such as soybean or corn oil  Omega-3 fats can help to decrease the risk of heart disease  Omega-3 fats are found in fish, such as salmon, herring, trout, and tuna  Omega-3 fats can also be found in plant foods, such as walnuts, flaxseed, soybeans, and canola oil    Foods to limit or avoid:   · Grains:      ¨ Snacks that are made with partially hydrogenated oils, such as chips, regular crackers, and butter-flavored popcorn    ¨ High-fat baked goods, such as biscuits, croissants, doughnuts, pies, cookies, and pastries    · Dairy:      ¨ Whole milk, 2% milk, and yogurt and ice cream made with whole milk    ¨ Half and half creamer, heavy cream, and whipping cream    ¨ Cheese, cream cheese, and sour cream    · Meats and proteins:      ¨ High-fat cuts of meat (T-bone steak, regular hamburger, and ribs)    ¨ Fried meat, poultry (turkey and chicken), and fish    ¨ Poultry (chicken and turkey) with skin    ¨ Cold cuts (salami or bologna), hot dogs, sanchez, and sausage    ¨ Whole eggs and egg yolks    · Vegetables and fruits with added fat:      ¨ Fried vegetables or vegetables in butter or high-fat sauces, such as cream or cheese sauces    ¨ Fried fruit or fruit served with butter or cream    · Fats:      ¨ Butter, stick margarine, and shortening    ¨ Coconut, palm oil, and palm kernel oil  Foods to include:   · Grains:      ¨ Whole-grain breads, cereals, pasta, and brown rice    ¨ Low-fat crackers and pretzels    · Vegetables and fruits:      ¨ Fresh, frozen, or canned vegetables (no salt or low-sodium)    ¨ Fresh, frozen, dried, or canned fruit (canned in light syrup or fruit juice)    ¨ Avocado    · Low-fat dairy products:      ¨ Nonfat (skim) or 1% milk    ¨ Nonfat or low-fat cheese, yogurt, and cottage cheese    · Meats and proteins:      ¨ Chicken or turkey with no skin    ¨ Baked or broiled fish    ¨ Lean beef and pork (loin, round, extra lean hamburger)    ¨ Beans and peas, unsalted nuts, soy products    ¨ Egg whites and substitutes    ¨ Seeds and nuts    · Fats:      ¨ Unsaturated oil, such as canola, olive, peanut, soybean, or sunflower oil    ¨ Soft or liquid margarine and vegetable oil spread    ¨ Low-fat salad dressing  Other ways to decrease fat:   · Read food labels before you buy foods  Choose foods that have less than 30% of calories from fat  Choose low-fat or fat-free dairy products  Remember that fat free does not mean calorie free  These foods still contain calories, and too many calories can lead to weight gain  · Trim fat from meat and avoid fried food  Trim all visible fat from meat before you cook it  Remove the skin from poultry  Do not presley meat, fish, or poultry  Bake, roast, boil, or broil these foods instead  Avoid fried foods  Eat a baked potato instead of Western Jessica fries  Steam vegetables instead of sautéing them in butter  · Add less fat to foods  Use imitation sanchez bits on salads and baked potatoes instead of regular sanchez bits  Use fat-free or low-fat salad dressings instead of regular dressings  Use low-fat or nonfat butter-flavored topping instead of regular butter or margarine on popcorn and other foods  Ways to decrease fat in recipes:  Replace high-fat ingredients with low-fat or nonfat ones  This may cause baked goods to be drier than usual  You may need to use nonfat cooking spray on pans to prevent food from sticking  You also may need to change the amount of other ingredients, such as water, in the recipe  Try the following:  · Use low-fat or light margarine instead of regular margarine or shortening       · Use lean ground turkey breast or chicken, or lean ground beef (less than 5% fat) instead of hamburger  · Add 1 teaspoon of canola oil to 8 ounces of skim milk instead of using cream or half and half  · Use grated zucchini, carrots, or apples in breads instead of coconut  · Use blenderized, low-fat cottage cheese, plain tofu, or low-fat ricotta cheese instead of cream cheese  · Use 1 egg white and 1 teaspoon of canola oil, or use ¼ cup (2 ounces) of fat-free egg substitute instead of a whole egg  · Replace half of the oil that is called for in a recipe with applesauce when you bake  Use 3 tablespoons of cocoa powder and 1 tablespoon of canola oil instead of a square of baking chocolate  How to increase fiber:  Eat enough high-fiber foods to get 20 to 30 grams of fiber every day  Slowly increase your fiber intake to avoid stomach cramps, gas, and other problems  · Eat 3 ounces of whole-grain foods each day  An ounce is about 1 slice of bread  Eat whole-grain breads, such as whole-wheat bread  Whole wheat, whole-wheat flour, or other whole grains should be listed as the first ingredient on the food label  Replace white flour with whole-grain flour or use half of each in recipes  Whole-grain flour is heavier than white flour, so you may have to add more yeast or baking powder  · Eat a high-fiber cereal for breakfast   Oatmeal is a good source of soluble fiber  Look for cereals that have bran or fiber in the name  Choose whole-grain products, such as brown rice, barley, and whole-wheat pasta  · Eat more beans, peas, and lentils  For example, add beans to soups or salads  Eat at least 5 cups of fruits and vegetables each day  Eat fruits and vegetables with the peel because the peel is high in fiber  © 2017 [address] Information is for End User's use only and may not be sold, redistributed or otherwise used for commercial purposes   All illustrations and images included in CareNotes® are the copyrighted property of A D A M , Inc  or [facility]  The above information is an  only  It is not intended as medical advice for individual conditions or treatments  Talk to your doctor, nurse or pharmacist before following any medical regimen to see if it is safe and effective for you  Heart Healthy Diet   AMBULATORY CARE:   A heart healthy diet  is an eating plan low in total fat, unhealthy fats, and sodium (salt)  A heart healthy diet helps decrease your risk for heart disease and stroke  Limit the amount of fat you eat to 25% to 35% of your total daily calories  Limit sodium to less than 2,300 mg each day  Healthy fats:  Healthy fats can help improve cholesterol levels  The risk for heart disease is decreased when cholesterol levels are normal  Choose healthy fats, such as the following:  · Unsaturated fat  is found in foods such as soybean, canola, olive, corn, and safflower oils  It is also found in soft tub margarine that is made with liquid vegetable oil  · Omega-3 fat  is found in certain fish, such as salmon, tuna, and trout, and in walnuts and flaxseed  Unhealthy fats:  Unhealthy fats can cause unhealthy cholesterol levels in your blood and increase your risk of heart disease  Limit unhealthy fats, such as the following:  · Cholesterol  is found in animal foods, such as eggs and lobster, and in dairy products made from whole milk  Limit cholesterol to less than 300 milligrams (mg) each day  You may need to limit cholesterol to 200 mg each day if you have heart disease  · Saturated fat  is found in meats, such as sanchez and hamburger  It is also found in chicken or turkey skin, whole milk, and butter  Limit saturated fat to less than 7% of your total daily calories  Limit saturated fat to less than 6% if you have heart disease or are at increased risk for it  · Trans fat  is found in packaged foods, such as potato chips and cookies  It is also in hard margarine, some fried foods, and shortening   Avoid trans fats as much as possible    Heart healthy foods and drinks to include:  Ask your dietitian or healthcare provider how many servings to have from each of the following food groups:  · Grains:      ¨ Whole-wheat breads, cereals, and pastas, and brown rice    ¨ Low-fat, low-sodium crackers and chips    · Vegetables:      ¨ Broccoli, green beans, green peas, and spinach    ¨ Collards, kale, and lima beans    ¨ Carrots, sweet potatoes, tomatoes, and peppers    ¨ Canned vegetables with no salt added    · Fruits:      ¨ Bananas, peaches, pears, and pineapple    ¨ Grapes, raisins, and dates    ¨ Oranges, tangerines, grapefruit, orange juice, and grapefruit juice    ¨ Apricots, mangoes, melons, and papaya    ¨ Raspberries and strawberries    ¨ Canned fruit with no added sugar    · Low-fat dairy products:      ¨ Nonfat (skim) milk, 1% milk, and low-fat almond, cashew, or soy milks fortified with calcium    ¨ Low-fat cheese, regular or frozen yogurt, and cottage cheese    · Meats and proteins , such as lean cuts of beef and pork (loin, leg, round), skinless chicken and turkey, legumes, soy products, egg whites, and nuts  Foods and drinks to limit or avoid:  Ask your dietitian or healthcare provider about these and other foods that are high in unhealthy fat, sodium, and sugar:  · Snack or packaged foods , such as frozen dinners, cookies, macaroni and cheese, and cereals with more than 300 mg of sodium per serving    · Canned or dry mixes  for cakes, soups, sauces, or gravies    · Vegetables with added sodium , such as instant potatoes, vegetables with added sauces, or regular canned vegetables    · Other foods high in sodium , such as ketchup, barbecue sauce, salad dressing, pickles, olives, soy sauce, and miso    · High-fat dairy foods  such as whole or 2% milk, cream cheese, or sour cream, and cheeses     · High-fat protein foods  such as high-fat cuts of beef (T-bone steaks, ribs), chicken or turkey with skin, and organ meats, such as liver    · Cured or smoked meats , such as hot dogs, sanchez, and sausage    · Unhealthy fats and oils , such as butter, stick margarine, shortening, and cooking oils such as coconut or palm oil    · Food and drinks high in sugar , such as soft drinks (soda), sports drinks, sweetened tea, candy, cake, cookies, pies, and doughnuts  Other diet guidelines to follow:   · Eat more foods containing omega-3 fats  Eat fish high in omega-3 fats at least 2 times a week  · Limit alcohol  Too much alcohol can damage your heart and raise your blood pressure  Women should limit alcohol to 1 drink a day  Men should limit alcohol to 2 drinks a day  A drink of alcohol is 12 ounces of beer, 5 ounces of wine, or 1½ ounces of liquor  · Choose low-sodium foods  High-sodium foods can lead to high blood pressure  Add little or no salt to food you prepare  Use herbs and spices in place of salt  · Eat more fiber  to help lower cholesterol levels  Eat at least 5 servings of fruits and vegetables each day  Eat 3 ounces of whole-grain foods each day  Legumes (beans) are also a good source of fiber  Lifestyle guidelines:   · Do not smoke  Nicotine and other chemicals in cigarettes and cigars can cause lung and heart damage  Ask your healthcare provider for information if you currently smoke and need help to quit  E-cigarettes or smokeless tobacco still contain nicotine  Talk to your healthcare provider before you use these products  · Exercise regularly  to help you maintain a healthy weight and improve your blood pressure and cholesterol levels  Ask your healthcare provider about the best exercise plan for you  Do not start an exercise program without asking your healthcare provider  Follow up with your healthcare provider as directed:  Write down your questions so you remember to ask them during your visits     © 2017 [address] Information is for End User's use only and may not be sold, redistributed or otherwise used for commercial purposes  All illustrations and images included in CareNotes® are the copyrighted property of [employer]  or Mani Turner  The above information is an  only  It is not intended as medical advice for individual conditions or treatments  Talk to your doctor, nurse or pharmacist before following any medical regimen to see if it is safe and effective for you  Calorie Counting Diet   WHAT YOU NEED TO KNOW:   What is a calorie counting diet? It is a meal plan based on counting calories each day to reach a healthy body weight  You will need to eat fewer calories if you are trying to lose weight  Weight loss may decrease your risk for certain health problems or improve your health if you have health problems  Some of these health problems include heart disease, high blood pressure, and diabetes  What foods should I avoid? Your dietitian will tell you if you need to avoid certain foods based on your body weight and health condition  You may need to avoid high-fat foods if you are at risk for or have heart disease  You may need to eat fewer foods from the breads and starches food group if you have diabetes  How many calories are in foods? The following is a list of foods and drinks with the approximate number of calories in each  Check the food label to find the exact number of calories  A dietitian can tell you how many calories you should have from each food group each day    · Carbohydrate:      ¨ ½ of a 3-inch bagel, 1 slice of bread, or ½ of a hamburger bun or hot dog bun (80)    ¨ 1 (8-inch) flour tortilla or ½ cup of cooked rice (100)    ¨ 1 (6-inch) corn tortilla (80)    ¨ 1 (6-inch) pancake or 1 cup of bran flakes cereal (110)    ¨ ½ cup of cooked cereal (80)    ¨ ½ cup of cooked pasta (85)    ¨ 1 ounce of pretzels (100)    ¨ 3 cups of air-popped popcorn without butter or oil (80)    · Dairy:      ¨ 1 cup of skim or 1% milk (90)    ¨ 1 cup of 2% milk (120)    ¨ 1 cup of whole milk (160)    ¨ 1 cup of 2% chocolate milk (220)    ¨ 1 ounce of low-fat cheese with 3 grams of fat per ounce (70)    ¨ 1 ounce of cheddar cheese (114)    ¨ ½ cup of 1% fat cottage cheese (80)    ¨ 1 cup of plain or sugar-free, fat-free yogurt (90)    · Protein foods:      ¨ 3 ounces of fish (not breaded or fried) (95)    ¨ 3 ounces of breaded, fried fish (195)    ¨ ¾ cup of tuna canned in water (105)    ¨ 3 ounces of chicken breast without skin (105)    ¨ 1 fried chicken breast with skin (350)    ¨ ¼ cup of fat free egg substitute (40)    ¨ 1 large egg (75)    ¨ 3 ounces of lean beef or pork (165)    ¨ 3 ounces of fried pork chop or ham (185)    ¨ ½ cup of cooked dried beans, such as kidney, hope, lentils, or navy (115)    ¨ 3 ounces of bologna or lunch meat (225)    ¨ 2 links of breakfast sausage (140)    · Vegetables:      ¨ ½ cup of sliced mushrooms (10)    ¨ 1 cup of salad greens, such as lettuce, spinach, or roland (15)    ¨ ½ cup of steamed asparagus (20)    ¨ ½ cup of cooked summer squash, zucchini squash, or green or wax beans (25)    ¨ 1 cup of broccoli or cauliflower florets, or 1 medium tomato (25)    ¨ 1 large raw carrot or ½ cup of cooked carrots (40)    ¨ ? of a medium cucumber or 1 stalk of celery (5)    ¨ 1 small baked potato (160)    ¨ 1 cup of breaded, fried vegetables (230)    · Fruit:      ¨ 1 (6-inch) banana (55)     ¨ ½ of a 4-inch grapefruit (55)    ¨ 15 grapes (60)    ¨ 1 medium orange or apple (70)    ¨ 1 large peach (65)    ¨ 1 cup of fresh pineapple chunks (75)    ¨ 1 cup of melon cubes (50)    ¨ 1¼ cups of whole strawberries (45)    ¨ ½ cup of fruit canned in juice (55)    ¨ ½ cup of fruit canned in heavy syrup (110)    ¨ ?  cup of raisins (130)    ¨ ½ cup of unsweetened fruit juice (60)    ¨ ½ cup of grape, cranberry, or prune juice (90)    · Fat:      ¨ 10 peanuts or 2 teaspoons of peanut butter (55)    ¨ 2 tablespoons of avocado or 1 tablespoon of regular salad dressing (45)    ¨ 2 slices of sanchez (90)    ¨ 1 teaspoon of oil, such as safflower, canola, corn, or olive oil (45)    ¨ 2 teaspoons of low-fat margarine, or 1 tablespoon of low-fat mayonnaise (50)    ¨ 1 teaspoon of regular margarine (40)    ¨ 1 tablespoon of regular mayonnaise (135)    ¨ 1 tablespoon of cream cheese or 2 tablespoons of low-fat cream cheese (45)    ¨ 2 tablespoons of vegetable shortening (215)    · Dessert and sweets:      ¨ 8 animal crackers or 5 vanilla wafers (80)    ¨ 1 frozen fruit juice bar (80)    ¨ ½ cup of ice milk or low-fat frozen yogurt (90)    ¨ ½ cup of sherbet or sorbet (125)    ¨ ½ cup of sugar-free pudding or custard (60)    ¨ ½ cup of ice cream (140)    ¨ ½ cup of pudding or custard (175)    ¨ 1 (2-inch) square chocolate brownie (185)    · Combination foods:      ¨ Bean burrito made with an 8-inch tortilla, without cheese (275)    ¨ Chicken breast sandwich with lettuce and tomato (325)    ¨ 1 cup of chicken noodle soup (60)    ¨ 1 beef taco (175)    ¨ Regular hamburger with lettuce and tomato (310)    ¨ Regular cheeseburger with lettuce and tomato (410)     ¨ ¼ of a 12-inch cheese pizza (280)    ¨ Fried fish sandwich with lettuce and tomato (425)    ¨ Hot dog and bun (275)    ¨ 1½ cups of macaroni and cheese (310)    ¨ Taco salad with a fried tortilla shell (870)    · Low-calorie foods:      ¨ 1 tablespoon of ketchup or 1 tablespoon of fat free sour cream (15)    ¨ 1 teaspoon of mustard (5)    ¨ ¼ cup of salsa (20)    ¨ 1 large dill pickle (15)    ¨ 1 tablespoon of fat free salad dressing (10)    ¨ 2 teaspoons of low-sugar, light jam or jelly, or 1 tablespoon of sugar-free syrup (15)    ¨ 1 sugar-free popsicle (15)    ¨ 1 cup of club soda, seltzer water, or diet soda (0)  CARE AGREEMENT:   You have the right to help plan your care  Discuss treatment options with your caregivers to decide what care you want to receive   You always have the right to refuse treatment  The above information is an  only  It is not intended as medical advice for individual conditions or treatments  Talk to your doctor, nurse or pharmacist before following any medical regimen to see if it is safe and effective for you  © 2017 [employer] Information is for End User's use only and may not be sold, redistributed or otherwise used for commercial purposes  All illustrations and images included in CareNotes® are the copyrighted property of A D A M , Inc  or Mani Turner

## 2019-03-06 NOTE — ASSESSMENT & PLAN NOTE
As reviewed today her blood pressure has improved but not yet controlled  Please increase her lisinopril to 20 mg  A new prescription was sent to her pharmacy

## 2019-03-06 NOTE — ASSESSMENT & PLAN NOTE
Lab Results   Component Value Date    HGBA1C 7 6 (H) 11/12/2018     As we discussed today your sugar has significantly improved as last A1c was 9 3%  We reviewed that while improved it is not yet at goal   We discussed that the glimepiride is not the best medication to continue long-term on  We reviewed we will refer you to the  so you can see if you can qualify for free medication programs  As discussed I would like you to start on Januvia 50 mg daily and we may be able to discontinue the glimepiride  Please continue your metformin twice daily  Please continue your improved diet as this has also made a significant improvement

## 2019-03-08 ENCOUNTER — PATIENT OUTREACH (OUTPATIENT)
Dept: INTERNAL MEDICINE CLINIC | Facility: CLINIC | Age: 59
End: 2019-03-08

## 2019-03-08 NOTE — PROGRESS NOTES
Per referral I contacted pt in regards to medical insurance and medication  I called pt with language line for Guatemalan,  # [phone]  Pt states she previously has MA in [place], but has not applied since moving back to [place]  Per pt she was a pt in [place] at [address] [place] [address] using the [address] [place] [address] assistance program prior to moving to [place]  Pt currently has no income and is residing with her daughter  Pt is unable to afford the medication Januvia that her physician would like her to begin  Pt was instructed at her last visit to contact our financial counselor, Xin Hamlin, to apply for [employer], but pt also needs to apply for MA since she has not done so  Pt is aware  Pt states she will call Sherri Monday to schedule an appointment  I also called Sara Santos from our medication program and left a message in regards to pt  I made her aware pt has not applied for MA, but is unable to afford recommended medication    Sara Santos will determine if pt can use medication program now, or need to apply for MA first

## 2019-03-12 ENCOUNTER — TELEPHONE (OUTPATIENT)
Dept: INTERNAL MEDICINE CLINIC | Facility: CLINIC | Age: 59
End: 2019-03-12

## 2019-03-12 ENCOUNTER — OFFICE VISIT (OUTPATIENT)
Dept: OBGYN CLINIC | Facility: HOSPITAL | Age: 59
End: 2019-03-12
Payer: COMMERCIAL

## 2019-03-12 ENCOUNTER — HOSPITAL ENCOUNTER (OUTPATIENT)
Dept: RADIOLOGY | Facility: HOSPITAL | Age: 59
Discharge: HOME | End: 2019-03-12
Attending: ORTHOPAEDIC SURGERY

## 2019-03-12 VITALS
WEIGHT: 169 LBS | HEART RATE: 88 BPM | DIASTOLIC BLOOD PRESSURE: 85 MMHG | BODY MASS INDEX: 31.91 KG/M2 | SYSTOLIC BLOOD PRESSURE: 176 MMHG | HEIGHT: 61 IN

## 2019-03-12 DIAGNOSIS — M89.8X6 PAIN OF RIGHT TIBIA: Primary | ICD-10-CM

## 2019-03-12 DIAGNOSIS — M89.8X6 PAIN OF RIGHT TIBIA: ICD-10-CM

## 2019-03-12 PROCEDURE — 99213 OFFICE O/P EST LOW 20 MIN: CPT | Performed by: ORTHOPAEDIC SURGERY

## 2019-03-12 PROCEDURE — 73590 X-RAY EXAM OF LOWER LEG: CPT

## 2019-03-12 NOTE — PROGRESS NOTES
Orthopedics          Elieser Pisano 63 y o  female MRN: [id number]      Chief Complaint:   right knee pain    HPI:   63 y  o female complaining of right knee pain status post 5 months right tibia shaft fracture intramedullary nailing and right medial male fracture ORIF  Patient states she has had significant improvements over the past few months time  She states having minimal pain she does note swelling which is relieved with compression and elevation she denies any tingling in her right lower extremity                  Review Of Systems:   · Skin: Normal  · Neuro: See HPI  · Musculoskeletal: See HPI  · All other systems reviewed and are negative    Past Medical History:   Past Medical History:   Diagnosis Date    Diabetes mellitus ([facility] )     Hypertension        Past Surgical History:   Past Surgical History:   Procedure Laterality Date    VT TREAT TIBIAL SHAFT FX, INTRAMED IMPLANT Right 9/28/2018    Procedure: OPEN TIBIA WOUND [address] OUT;  INSERTION NAIL IM TIBIA  AND MEDIAL MALLEOLUS FRACTURE WITH SYNTHES HARDWARE;  Surgeon: Cam Russ MD;  [facility]: [facility];  Service: Orthopedics    TUBAL LIGATION         Family History:  Family history reviewed and non-contributory  Family History   Problem Relation Age of Onset    Diabetes Mother     No Known Problems Father     No Known Problems Sister     No Known Problems Brother     No Known Problems Son     No Known Problems Daughter          Social History:  Social History     Socioeconomic History    Marital status: Single     Spouse name: None    Number of children: None    Years of education: None    Highest education level: None   Occupational History    None   Social Needs    Financial resource strain: None    Food insecurity:     Worry: None     Inability: None    Transportation needs:     Medical: None     Non-medical: None   Tobacco Use    Smoking status: Never Smoker    Smokeless tobacco: Never Used   Substance and Sexual Activity    Alcohol use: No    Drug use: No    Sexual activity: Not Currently   Lifestyle    Physical activity:     Days per week: None     Minutes per session: None    Stress: None   Relationships    Social connections:     Talks on phone: None     Gets together: None     Attends Congregational service: None     Active member of club or organization: None     Attends meetings of clubs or organizations: None     Relationship status: None    Intimate partner violence:     Fear of current or ex partner: None     Emotionally abused: None     Physically abused: None     Forced sexual activity: None   Other Topics Concern    None   Social History Narrative    ** Merged History Encounter **            Allergies:   No Known Allergies    Labs:  0   Lab Value Date/Time    HCT 38 1 11/12/2018 0938    HCT 30 3 (L) 10/14/2018 0518    HCT 29 5 (L) 10/11/2018 0525    HGB 12 1 11/12/2018 0938    HGB 9 5 (L) 10/14/2018 0518    HGB 9 1 (L) 10/11/2018 0525    INR 1 06 09/28/2018 0814    WBC 6 98 11/12/2018 0938    WBC 6 15 10/14/2018 0518    WBC 7 26 10/11/2018 0525       Meds:    Current Outpatient Medications:     glimepiride (AMARYL) 4 mg tablet, Take 1 tablet (4 mg total) by mouth daily with breakfast for 90 days, Disp: 90 tablet, Rfl: 0    Lancets MISC, by Does not apply route, Disp: , Rfl:     lisinopril (ZESTRIL) 20 mg tablet, Take 1 tablet (20 mg total) by mouth daily for 180 days, Disp: 90 tablet, Rfl: 1    metFORMIN (GLUCOPHAGE) 1000 MG tablet, Take 1 tablet (1,000 mg total) by mouth 2 (two) times a day with meals for 90 days, Disp: 180 tablet, Rfl: 0    simvastatin (ZOCOR) 10 mg tablet, Take 1 tablet (10 mg total) by mouth daily at bedtime for 90 days, Disp: 90 tablet, Rfl: 0    sitaGLIPtin (JANUVIA) 50 mg tablet, Take 50 mg by mouth daily, Disp: , Rfl:       Physical Exam:     General Appearance:    Alert, cooperative, no distress, appears stated age   Head:    Normocephalic, without obvious abnormality, atraumatic   Eyes: conjunctiva/corneas clear, both eyes        Nose:   Nares normal, septum midline, no drainage    Throat:   Lips normal; teeth and gums normal   Neck:    symmetrical, trachea midline, ;     thyroid:  no enlargement/   Back:     Symmetric, no curvature, ROM normal   Lungs:   No audible wheezing or labored breathing   Chest Wall:    No tenderness or deformity    Heart:    Regular rate and rhythm               Pulses:   2+ and symmetric all extremities   Skin:   Skin color, texture, turgor normal, no rashes or lesions   Neurologic:   normal strength, sensation and reflexes     throughout       Musculoskeletal: right lower extremity  · Examination of patient's right lower extremity well-healed anterior incisions full active extension right knee flexion greater than 120°  No pain palpation of the tibia shaft ankle dorsi plantar flexion motion and strength equal to left lower extremity sensation tacked distal pulses present    Radiology:   I personally reviewed the films  X-rays right tibia fibular reviewed x-rays reveal well healed tibia shaft fracture and medial malleolus no evidence of hardware failure    _*_*_*_*_*_*_*_*_*_*_*_*_*_*_*_*_*_*_*_*_*_*_*_*_*_*_*_*_*_*_*_*_*_*_*_*_*_*_*_*_*    Assessment:  63 y  o female with right lower extremity well-healed tibia shaft fracture and medial malleolus status post ORIF and IM nailing    Plan:   · Weight bearing as tolerated  right lower extremity  · Activities as tolerated  · Advised elevation and compression for swelling  · Follow up on as needed       Jackson Dangelo PA-C

## 2019-03-17 NOTE — PROGRESS NOTES
Discharge Note     [facility] Chiqui has made good functional progress with physical therapy  she was educated and updated in her home exercise program  Risa Warren will be discharged from formal therapy due to independence in HEP but will follow up as needed  See last RE for objective measurements

## 2019-05-22 DIAGNOSIS — E78.2 MIXED HYPERLIPIDEMIA: ICD-10-CM

## 2019-05-22 DIAGNOSIS — E11.9 TYPE 2 DIABETES MELLITUS WITHOUT COMPLICATION, WITHOUT LONG-TERM CURRENT USE OF INSULIN (HCC): ICD-10-CM

## 2019-05-23 RX ORDER — SIMVASTATIN 10 MG
10 TABLET ORAL
Qty: 90 TABLET | Refills: 0 | Status: SHIPPED | OUTPATIENT
Start: 2019-05-23 | End: 2019-08-27 | Stop reason: SDUPTHER

## 2019-06-19 DIAGNOSIS — E11.9 TYPE 2 DIABETES MELLITUS WITHOUT COMPLICATION, WITHOUT LONG-TERM CURRENT USE OF INSULIN (HCC): ICD-10-CM

## 2019-06-19 DIAGNOSIS — I10 ESSENTIAL HYPERTENSION: ICD-10-CM

## 2019-06-20 RX ORDER — LISINOPRIL 20 MG/1
20 TABLET ORAL DAILY
Qty: 90 TABLET | Refills: 1 | OUTPATIENT
Start: 2019-06-20 | End: 2019-12-17

## 2019-06-20 RX ORDER — GLIMEPIRIDE 4 MG/1
4 TABLET ORAL
Qty: 90 TABLET | Refills: 0 | Status: SHIPPED | OUTPATIENT
Start: 2019-06-20 | End: 2019-08-27 | Stop reason: SDUPTHER

## 2019-06-24 ENCOUNTER — APPOINTMENT (OUTPATIENT)
Dept: LAB | Facility: CLINIC | Age: 59
End: 2019-06-24

## 2019-06-24 DIAGNOSIS — Z12.11 SCREENING FOR COLON CANCER: ICD-10-CM

## 2019-06-24 DIAGNOSIS — E11.42 TYPE 2 DIABETES MELLITUS WITH DIABETIC POLYNEUROPATHY, WITHOUT LONG-TERM CURRENT USE OF INSULIN (HCC): Chronic | ICD-10-CM

## 2019-06-24 LAB
ALBUMIN SERPL BCP-MCNC: 3.8 G/DL (ref 3.5–5)
ALP SERPL-CCNC: 92 U/L (ref 46–116)
ALT SERPL W P-5'-P-CCNC: 15 U/L (ref 12–78)
ANION GAP SERPL CALCULATED.3IONS-SCNC: 4 MMOL/L (ref 4–13)
AST SERPL W P-5'-P-CCNC: 8 U/L (ref 5–45)
BILIRUB SERPL-MCNC: 0.38 MG/DL (ref 0.2–1)
BUN SERPL-MCNC: 12 MG/DL (ref 5–25)
CALCIUM SERPL-MCNC: 9.4 MG/DL (ref 8.3–10.1)
CHLORIDE SERPL-SCNC: 105 MMOL/L (ref 100–108)
CO2 SERPL-SCNC: 29 MMOL/L (ref 21–32)
CREAT SERPL-MCNC: 0.53 MG/DL (ref 0.6–1.3)
EST. AVERAGE GLUCOSE BLD GHB EST-MCNC: 174 MG/DL
GFR SERPL CREATININE-BSD FRML MDRD: 105 ML/MIN/1.73SQ M
GLUCOSE P FAST SERPL-MCNC: 169 MG/DL (ref 65–99)
HBA1C MFR BLD: 7.7 % (ref 4.2–6.3)
HEMOCCULT STL QL IA: NEGATIVE
POTASSIUM SERPL-SCNC: 3.9 MMOL/L (ref 3.5–5.3)
PROT SERPL-MCNC: 7.9 G/DL (ref 6.4–8.2)
SODIUM SERPL-SCNC: 138 MMOL/L (ref 136–145)

## 2019-06-24 PROCEDURE — G0328 FECAL BLOOD SCRN IMMUNOASSAY: HCPCS

## 2019-06-24 PROCEDURE — 80053 COMPREHEN METABOLIC PANEL: CPT

## 2019-06-24 PROCEDURE — 36415 COLL VENOUS BLD VENIPUNCTURE: CPT

## 2019-06-24 PROCEDURE — 83036 HEMOGLOBIN GLYCOSYLATED A1C: CPT

## 2019-07-22 ENCOUNTER — TELEPHONE (OUTPATIENT)
Dept: OBGYN CLINIC | Facility: HOSPITAL | Age: 59
End: 2019-07-22

## 2019-07-22 NOTE — TELEPHONE ENCOUNTER
Patient sees Dr Lorenza Vang  The patient has recently leny looking for employment and is needing a letter stating that she can work with or without restrictions  And if she has restrictions what they would be

## 2019-08-12 LAB
LEFT EYE DIABETIC RETINOPATHY: NORMAL
RIGHT EYE DIABETIC RETINOPATHY: NORMAL

## 2019-08-27 DIAGNOSIS — E11.9 TYPE 2 DIABETES MELLITUS WITHOUT COMPLICATION, WITHOUT LONG-TERM CURRENT USE OF INSULIN (HCC): ICD-10-CM

## 2019-08-27 DIAGNOSIS — E78.2 MIXED HYPERLIPIDEMIA: ICD-10-CM

## 2019-08-27 RX ORDER — SIMVASTATIN 10 MG
10 TABLET ORAL
Qty: 90 TABLET | Refills: 1 | Status: SHIPPED | OUTPATIENT
Start: 2019-08-27 | End: 2019-11-25

## 2019-08-27 RX ORDER — GLIMEPIRIDE 4 MG/1
4 TABLET ORAL
Qty: 90 TABLET | Refills: 1 | Status: SHIPPED | OUTPATIENT
Start: 2019-08-27 | End: 2019-11-25

## 2019-08-28 ENCOUNTER — PATIENT OUTREACH (OUTPATIENT)
Dept: INTERNAL MEDICINE CLINIC | Facility: CLINIC | Age: 59
End: 2019-08-28

## 2019-08-30 ENCOUNTER — PATIENT OUTREACH (OUTPATIENT)
Dept: INTERNAL MEDICINE CLINIC | Facility: CLINIC | Age: 59
End: 2019-08-30

## 2019-08-30 NOTE — PROGRESS NOTES
In coming call received from pt and SW explained the Healthy Women Program through the Health Department  SW spoke in [place] but pt appeared to understand full conversation and pt declined   CATHERINE did the Enrollment and Consent form over the phone and pt appears to qualify  CATHERINE has referred pt to Yen [phone]  at the [address] [place] [address] in [facility]   Pt relates that her dgt can bring her to the appointment  CATHERINE asked the [facility] to f/u and schedule with pt  CATHERINE will f/u with the [address] as well re forms to see if verbal consent accepted  Pt resides she can no longer work  She resides with her supportive dgt and her 5 grandchildren

## 2019-09-09 ENCOUNTER — PATIENT OUTREACH (OUTPATIENT)
Dept: INTERNAL MEDICINE CLINIC | Facility: CLINIC | Age: 59
End: 2019-09-09

## 2019-09-09 ENCOUNTER — TRANSCRIBE ORDERS (OUTPATIENT)
Dept: MAMMOGRAPHY | Facility: CLINIC | Age: 59
End: 2019-09-09

## 2019-09-09 ENCOUNTER — OFFICE VISIT (OUTPATIENT)
Dept: INTERNAL MEDICINE CLINIC | Facility: CLINIC | Age: 59
End: 2019-09-09

## 2019-09-09 VITALS
HEIGHT: 61 IN | WEIGHT: 172.84 LBS | TEMPERATURE: 98.2 F | SYSTOLIC BLOOD PRESSURE: 150 MMHG | HEART RATE: 84 BPM | DIASTOLIC BLOOD PRESSURE: 82 MMHG | BODY MASS INDEX: 32.63 KG/M2

## 2019-09-09 DIAGNOSIS — R92.8 ABNORMAL MAMMOGRAM OF RIGHT BREAST: ICD-10-CM

## 2019-09-09 DIAGNOSIS — E11.3393 MODERATE NONPROLIFERATIVE DIABETIC RETINOPATHY OF BOTH EYES WITHOUT MACULAR EDEMA ASSOCIATED WITH TYPE 2 DIABETES MELLITUS (HCC): Chronic | ICD-10-CM

## 2019-09-09 DIAGNOSIS — I10 ESSENTIAL HYPERTENSION: Chronic | ICD-10-CM

## 2019-09-09 DIAGNOSIS — E11.42 TYPE 2 DIABETES MELLITUS WITH DIABETIC POLYNEUROPATHY, WITHOUT LONG-TERM CURRENT USE OF INSULIN (HCC): Primary | Chronic | ICD-10-CM

## 2019-09-09 DIAGNOSIS — E78.2 MIXED HYPERLIPIDEMIA: ICD-10-CM

## 2019-09-09 DIAGNOSIS — Z00.00 HEALTH CARE MAINTENANCE: Chronic | ICD-10-CM

## 2019-09-09 DIAGNOSIS — E66.9 OBESITY (BMI 30.0-34.9): ICD-10-CM

## 2019-09-09 PROBLEM — S82.451B: Status: RESOLVED | Noted: 2018-09-28 | Resolved: 2019-09-09

## 2019-09-09 PROBLEM — S82.401F: Status: RESOLVED | Noted: 2018-10-03 | Resolved: 2019-09-09

## 2019-09-09 PROBLEM — T14.8XXA TRAUMATIC ECCHYMOSIS OF MULTIPLE SITES: Status: RESOLVED | Noted: 2018-09-28 | Resolved: 2019-09-09

## 2019-09-09 PROBLEM — S32.010A CLOSED COMPRESSION FRACTURE OF THORACOLUMBAR VERTEBRA (HCC): Status: RESOLVED | Noted: 2018-10-03 | Resolved: 2019-09-09

## 2019-09-09 PROBLEM — S82.201F: Status: RESOLVED | Noted: 2018-10-03 | Resolved: 2019-09-09
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
## 2019-09-09 PROBLEM — S32.009A CLOSED FRACTURE OF LUMBAR VERTEBRAL BODY (HCC): Status: RESOLVED | Noted: 2018-09-28 | Resolved: 2019-09-09

## 2019-09-09 PROBLEM — E11.3399 MODERATE NONPROLIFERATIVE DIABETIC RETINOPATHY WITHOUT MACULAR EDEMA ASSOCIATED WITH TYPE 2 DIABETES MELLITUS (HCC): Chronic | Status: ACTIVE | Noted: 2018-10-29

## 2019-09-09 PROBLEM — S22.009A CLOSED FRACTURE OF THORACIC VERTEBRAL BODY (HCC): Status: RESOLVED | Noted: 2018-09-28 | Resolved: 2019-09-09

## 2019-09-09 PROBLEM — T14.8XXA SUBCUTANEOUS HEMATOMA: Status: RESOLVED | Noted: 2018-09-28 | Resolved: 2019-09-09

## 2019-09-09 PROBLEM — S82.251C: Status: RESOLVED | Noted: 2018-09-28 | Resolved: 2019-09-09

## 2019-09-09 PROBLEM — S22.080A CLOSED COMPRESSION FRACTURE OF THORACOLUMBAR VERTEBRA (HCC): Status: RESOLVED | Noted: 2018-10-03 | Resolved: 2019-09-09

## 2019-09-09 PROBLEM — S82.251B OPEN DISPLACED COMMINUTED FRACTURE OF SHAFT OF RIGHT TIBIA: Status: RESOLVED | Noted: 2018-09-28 | Resolved: 2019-09-09

## 2019-09-09 PROCEDURE — 99214 OFFICE O/P EST MOD 30 MIN: CPT | Performed by: PHYSICIAN ASSISTANT

## 2019-09-09 RX ORDER — LISINOPRIL 20 MG/1
20 TABLET ORAL DAILY
Qty: 90 TABLET | Refills: 1 | Status: SHIPPED | OUTPATIENT
Start: 2019-09-09 | End: 2020-03-07

## 2019-09-09 NOTE — PROGRESS NOTES
CATHERINE met with this 64 y/o Central African speaking female pt via   ID # [id number]  Pt confirms she would like to have her diagnostic mammogram done  Pt has signed the [employer] paper work which has been faxed to the [facility] [phone] and confirmation received  Pt is scheduled at the [address] [place] [address] [phone] for Monday 9/16/19 at 11:45 am  Catherine spoke with Lenka Acevedo to schedule and with Tracey Brantley as well  In addition SW has helped pt to get scheduled at the [address] [place] [address] [facility]  for 9/11/19 at 9:45 AM    Pt reports her dgt can bring her to these appointments  Pt has also been refereed to [facility] of the Medicine Program  She help pt to get Januvia from the [facility]     Pt to f/u with SW if needed

## 2019-09-09 NOTE — PROGRESS NOTES
Assessment/Plan:    As per our discussion I have sent refills of your lisinopril to your pharmacy  Please remember to take this tablet once daily  Also reviewed that your metformin, glimepiride and simvastatin will need additional refills at the end of November  Also reviewed that you still have not completed the application for your Januvia to help improve your sugars as they remain above normal and not at goal   We will have you speak with our medication programs staff member so that you can get the application completed to receive the Januvia for free  This is to help assist you get your sugars under better control as well as discussed importance of healthier diet as well as improving your weight  We reviewed that with additional meaningful healthy weight loss and improvement in her diet with medications we should be able to help you get your diabetes under good control  Also you will be meeting with the  today to complete your application for the Healthy Woman [place] so may receive the additional images of your Right breast   You are aware that once this imaging has completed we will contact you and advise if you need additional imaging or evaluations  New order placed and you will be contacted with the date and time for your diabetic eye exam     Once you have been approved and on the additional medication called Januvia once a day for your sugar we will then order your appropriate follow-up blood testing to monitor your sugar levels  Order has been placed for your diabetic eye exam that is due in October  We will contact you with the date and time of your appointment  Please also call and schedule your appointment with gyn for routine women's care  This is also covered with your star wellness and Pap smears can also be covered under the healthy woman program     Diabetic foot exam completed today  No problem-specific Assessment & Plan notes found for this encounter  Diagnoses and all orders for this visit:    Type 2 diabetes mellitus with diabetic polyneuropathy, without long-term current use of insulin (HCC)    Essential hypertension  -     lisinopril (ZESTRIL) 20 mg tablet; Take 1 tablet (20 mg total) by mouth daily for 180 days    Mixed hyperlipidemia    Abnormal mammogram of right breast    Moderate nonproliferative diabetic retinopathy of both eyes without macular edema associated with type 2 diabetes mellitus ([address] )  -     Ambulatory referral to Ophthalmology; Future    Obesity (BMI 30 0-34  9)    Health care maintenance  -     Ambulatory referral to Obstetrics / Gynecology; Future          Subjective:      Patient ID: Melodie Aguero is a 63 y o  female  Pt here for DM review    Patient still never came in to fill out application for [facility] [place] [facility] [place] and ran out of her BP med      Is taking the metformin , glimepiride and simvastatin  Patient confirms that she ran out of her lisinopril a few weeks ago  Patient did have labs completed back in June and her sugar was elevated at 7 7% however at that time patient was supposed to have been completing her application get started on Januvia  As per discussion today patient never return phone calls and never came in therefore never was evaluated for approval for Januvia  Will have patient meet with medication program today to get the application submitted and once she has been on the Januvia for 90 days will get a new sugar test     Patient also having application filled out for healthy woman [place] as she needs additional views for mammogram and as she has no coverage this will help get her approved for the healthy woman [place] program even for diagnostic imaging  Original mammogram was completed in February of 2018  Patient was to repeat diagnostic imaging of the right breast and routine screening of the left breast in 1 year    Patient however secondary to lack of insurance did not return to the office nor did she schedule these appointments  The following portions of the patient's history were reviewed and updated as appropriate: allergies, current medications, past family history, past medical history, past social history, past surgical history and problem list     Review of Systems   Constitutional: Negative  Negative for chills and fever  HENT: Negative  Negative for congestion, dental problem and trouble swallowing  Eyes: Negative  Negative for visual disturbance  Respiratory: Negative  Negative for cough, chest tightness and shortness of breath  Cardiovascular: Negative  Negative for chest pain, palpitations and leg swelling  Gastrointestinal: Negative  Negative for abdominal pain, constipation and diarrhea  Endocrine: Positive for polyuria  Negative for cold intolerance, heat intolerance, polydipsia and polyphagia  Genitourinary: Positive for frequency  Negative for dysuria and urgency  Musculoskeletal: Positive for arthralgias and back pain  Patient does sometimes get back pain and some generalized arthralgias but reports unchanged from all previous evaluations   Skin: Negative for color change and rash  Neurological: Positive for headaches  Negative for dizziness, syncope, light-headedness and numbness  Patient reports that she does occasionally get a headache  She does not report increase in incidence of headache since running out of her blood pressure medication  Patient denies any numbness tingling to hands or feet  Psychiatric/Behavioral: Negative  Negative for sleep disturbance  The patient is not nervous/anxious  Objective:      /82 (BP Location: Right arm, Patient Position: Sitting, Cuff Size: Standard)   Pulse 84   Temp 98 2 °F (36 8 °C) (Oral)   Ht 5' 0 5" (1 537 m)   Wt 78 4 kg (172 lb 13 5 oz)   BMI 33 20 kg/m²          Physical Exam   Constitutional: She is oriented to person, place, and time  She appears well-developed and well-nourished  No distress  HENT:   Head: Normocephalic and atraumatic  Right Ear: External ear normal    Left Ear: External ear normal    Eyes: Pupils are equal, round, and reactive to light  Conjunctivae are normal    Neck: Normal range of motion  No JVD present  No thyromegaly present  Cardiovascular: Normal rate, regular rhythm and normal heart sounds  Pulses are no weak pulses  No murmur heard  Pulses:       Dorsalis pedis pulses are 2+ on the right side, and 2+ on the left side  Pulmonary/Chest: Effort normal and breath sounds normal    Abdominal: Soft  Bowel sounds are normal  There is no tenderness  Musculoskeletal: Normal range of motion  She exhibits no edema  Feet:   Right Foot:   Skin Integrity: Positive for dry skin  Negative for ulcer, skin breakdown, erythema, warmth or callus  Left Foot:   Skin Integrity: Positive for dry skin  Negative for ulcer, skin breakdown, erythema, warmth or callus  Neurological: She is alert and oriented to person, place, and time  No cranial nerve deficit  Skin: Skin is warm and dry  Capillary refill takes less than 2 seconds  Psychiatric: She has a normal mood and affect  Her behavior is normal        Patient's shoes and socks removed  Right Foot/Ankle   Right Foot Inspection  Skin Exam: skin normal, skin intact and dry skin no warmth, no callus, no erythema, no maceration, no abnormal color, no pre-ulcer, no ulcer and no callus                            Sensory   Vibration: intact    Monofilament testing: intact  Vascular    The right DP pulse is 2+  Left Foot/Ankle  Left Foot Inspection  Skin Exam: skin normal, skin intact and dry skinno warmth, no erythema, no maceration, normal color, no pre-ulcer, no ulcer and no callus                                         Sensory   Vibration: intact    Monofilament: intact  Vascular    The left DP pulse is 2+  Assign Risk Category:  No deformity present; No loss of protective sensation;  No weak pulses       Risk: 0

## 2019-09-09 NOTE — PATIENT INSTRUCTIONS
As per our discussion I have sent refills of your lisinopril to your pharmacy  Please remember to take this tablet once daily  Also reviewed that your metformin, glimepiride and simvastatin will need additional refills at the end of November  Also reviewed that you still have not completed the application for your Januvia to help improve your sugars as they remain above normal and not at goal   We will have you speak with our medication programs staff member so that you can get the application completed to receive the Januvia for free  This is to help assist you get your sugars under better control as well as discussed importance of healthier diet as well as improving your weight  We reviewed that with additional meaningful healthy weight loss and improvement in her diet with medications we should be able to help you get your diabetes under good control  Also you will be meeting with the  today to complete your application for the Healthy Woman [place] so may receive the additional images of your Right breast   You are aware that once this imaging has completed we will contact you and advise if you need additional imaging or evaluations  New order placed and you will be contacted with the date and time for your diabetic eye exam     Once you have been approved and on the additional medication called Januvia once a day for your sugar we will then order your appropriate follow-up blood testing to monitor your sugar levels  Order has been placed for your diabetic eye exam that is due in October  We will contact you with the date and time of your appointment  Please also call and schedule your appointment with gyn for routine women's care  This is also covered with your star wellness and Pap smears can also be covered under the healthy woman program     Diabetic foot exam completed today

## 2019-09-11 ENCOUNTER — OFFICE VISIT (OUTPATIENT)
Dept: OBGYN CLINIC | Facility: CLINIC | Age: 59
End: 2019-09-11

## 2019-09-11 ENCOUNTER — TELEPHONE (OUTPATIENT)
Dept: OBGYN CLINIC | Facility: CLINIC | Age: 59
End: 2019-09-11

## 2019-09-11 VITALS — DIASTOLIC BLOOD PRESSURE: 95 MMHG | SYSTOLIC BLOOD PRESSURE: 157 MMHG | BODY MASS INDEX: 33.23 KG/M2 | WEIGHT: 173 LBS

## 2019-09-11 DIAGNOSIS — Z01.419 ENCOUNTER FOR ANNUAL ROUTINE GYNECOLOGICAL EXAMINATION: ICD-10-CM

## 2019-09-11 DIAGNOSIS — N63.20 LEFT BREAST LUMP: Primary | ICD-10-CM

## 2019-09-11 PROCEDURE — 87624 HPV HI-RISK TYP POOLED RSLT: CPT | Performed by: NURSE PRACTITIONER

## 2019-09-11 PROCEDURE — 99386 PREV VISIT NEW AGE 40-64: CPT | Performed by: NURSE PRACTITIONER

## 2019-09-11 PROCEDURE — G0145 SCR C/V CYTO,THINLAYER,RESCR: HCPCS | Performed by: NURSE PRACTITIONER
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
## 2019-09-11 NOTE — PATIENT INSTRUCTIONS
Annual through [facility]   Pt has appointment for right breast diagnostic mammogram and ultrasound scheduled for 09/16/2019  New finding today of left breast with 2 lumps and needs a left breast diagnostic mammogram and ultrasound  Reviewed with patient to call scheduling to add this test with her imaging she is having done on 9/16/2019  Reviewed with pt to call after imaging to review

## 2019-09-12 ENCOUNTER — TELEPHONE (OUTPATIENT)
Dept: MULTI SPECIALTY CLINIC | Facility: CLINIC | Age: 59
End: 2019-09-12

## 2019-09-13 LAB
HPV HR 12 DNA CVX QL NAA+PROBE: NEGATIVE
HPV16 DNA CVX QL NAA+PROBE: NEGATIVE
HPV18 DNA CVX QL NAA+PROBE: NEGATIVE

## 2019-09-16 ENCOUNTER — HOSPITAL ENCOUNTER (OUTPATIENT)
Dept: ULTRASOUND IMAGING | Facility: CLINIC | Age: 59
Discharge: HOME | End: 2019-09-16
Payer: COMMERCIAL

## 2019-09-16 ENCOUNTER — HOSPITAL ENCOUNTER (OUTPATIENT)
Dept: MAMMOGRAPHY | Facility: CLINIC | Age: 59
Discharge: HOME | End: 2019-09-16
Payer: COMMERCIAL

## 2019-09-16 VITALS — HEIGHT: 61 IN | BODY MASS INDEX: 32.66 KG/M2 | WEIGHT: 173 LBS

## 2019-09-16 DIAGNOSIS — N63.20 LEFT BREAST LUMP: ICD-10-CM

## 2019-09-16 DIAGNOSIS — R92.8 ABNORMAL MAMMOGRAM OF RIGHT BREAST: ICD-10-CM

## 2019-09-16 LAB
LAB AP GYN PRIMARY INTERPRETATION: NORMAL
Lab: NORMAL

## 2019-09-16 PROCEDURE — 77066 DX MAMMO INCL CAD BI: CPT

## 2019-09-16 PROCEDURE — 76642 ULTRASOUND BREAST LIMITED: CPT

## 2019-09-16 PROCEDURE — G0279 TOMOSYNTHESIS, MAMMO: HCPCS

## 2019-09-18 ENCOUNTER — TELEPHONE (OUTPATIENT)
Dept: OBGYN CLINIC | Facility: CLINIC | Age: 59
End: 2019-09-18

## 2019-09-18 NOTE — TELEPHONE ENCOUNTER
----- Message from Lashell Mosley, [address] sent at 9/18/2019  7:33 AM EDT -----  Please call patient to inform that her Pap and HPV is negative   thanks

## 2019-09-19 ENCOUNTER — TELEPHONE (OUTPATIENT)
Dept: MULTI SPECIALTY CLINIC | Facility: CLINIC | Age: 59
End: 2019-09-19

## 2019-09-24 ENCOUNTER — TELEPHONE (OUTPATIENT)
Dept: OBGYN CLINIC | Facility: CLINIC | Age: 59
End: 2019-09-24

## 2019-09-24 NOTE — TELEPHONE ENCOUNTER
I reviewed MMG and uS results over the phone with pt and her daughter   Reviewed to call with any concerns, and bilateral screening due in 1 year

## 2019-11-25 DIAGNOSIS — E11.9 TYPE 2 DIABETES MELLITUS WITHOUT COMPLICATION, WITHOUT LONG-TERM CURRENT USE OF INSULIN (HCC): ICD-10-CM

## 2020-01-07 ENCOUNTER — TELEPHONE (OUTPATIENT)
Dept: OTHER | Facility: OTHER | Age: 60
End: 2020-01-07

## 2020-01-09 ENCOUNTER — TELEPHONE (OUTPATIENT)
Dept: INTERNAL MEDICINE CLINIC | Facility: CLINIC | Age: 60
End: 2020-01-09

## 2020-01-21 RX ORDER — SIMVASTATIN 10 MG
10 TABLET ORAL
COMMUNITY
Start: 2019-11-29

## 2020-03-09 ENCOUNTER — TELEPHONE (OUTPATIENT)
Dept: INTERNAL MEDICINE CLINIC | Facility: CLINIC | Age: 60
End: 2020-03-09

## 2020-04-10 ENCOUNTER — TELEPHONE (OUTPATIENT)
Dept: INTERNAL MEDICINE CLINIC | Facility: CLINIC | Age: 60
End: 2020-04-10
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
## 2023-08-21 NOTE — PROGRESS NOTES
FYI to provider - Patient is lost to pap tracking follow-up. Attempts to contact pt have been made per reminder process and there has been no reply and/or no appt scheduled. Contact hx listed below.     2005, 2006, 2008 NIL paps  7/12/22 NIL pap, +HR HPV (not 16/18). Plan: cotest in 1 year  7/19/22 left message  7/20/22 left message   7/21/22 Result letter sent  6/21/23 Reminder mychart  7/21/23 Reminder call -- left message  8/21/23 Lost to follow-up for pap tracking     Jenny Baumann RN BSN, Pap Tracking    Physical Therapy Progress Note   10/05/18 8980   Pain Assessment   Pain Assessment 0-10   Pain Score 3   Pain Type Acute pain   Pain Location Leg   Pain Orientation Right   Restrictions/Precautions   Precautions Bed/chair alarms;Cognitive; Fall Risk;Supervision on toilet/commode;Spinal precautions   RUE Weight Bearing Per Order WBAT   LUE Weight Bearing Per Order WBAT   RLE Weight Bearing Per Order NWB   LLE Weight Bearing Per Order WBAT   Braces or Orthoses TLSO   Cognition   Overall Cognitive Status WFL   Arousal/Participation Cooperative   Attention Within functional limits   Orientation Level Oriented X4   Memory Decreased short term memory   Following Commands Follows one step commands with increased time or repetition   Subjective   Subjective reports mild pain in R LE; no other c/o   QI: Roll Left and Right   Assistance Needed Physical assistance   Assistance Provided by [place] 25%-49%   Roll Left and Right CARE Score 3   QI: Sit to Lying   Assistance Needed Physical assistance   Assistance Provided by [place] 25%-49%   Sit to Lying CARE Score 3   QI: Lying to Sitting on Side of Bed   Assistance Needed Physical assistance   Assistance Provided by [place] 50%-74%   Lying to Sitting on Side of Bed CARE Score 2   QI: Sit to Stand   Assistance Needed Physical assistance   Assistance Provided by [place] 75% or more   Sit to Stand CARE Score 2   QI: Chair/Bed-to-Chair Transfer   Assistance Needed Physical assistance   Assistance Provided by [place] 75% or more   Chair/Bed-to-Chair Transfer CARE Score 2   Transfer Bed/Chair/Wheelchair   Limitations Noted In LE Strength;UE Strength; Endurance;Balance;Problem Solving;Pain Management   Adaptive Equipment Transfer Board; Roller Walker   Sit Pivot Minimal Assist   Sit to Stand Minimal Assist   Stand to Sit Moderate Assist   Supine to Sit Moderate Assist   Sit to Supine Minimal Assist   Bed, Chair, Wheelchair Transfer (FIM) 3 - Butler needs to lift, boost or assist to stand OR sit   QI: Car Transfer   Assistance Needed Physical assistance   Assistance Provided by [place] 75% or more   Car Transfer CARE Score 2   QI: Walk 10 Feet   Reason if not Attempted Safety concerns   Walk 10 Feet CARE Score 88   QI: Walk 50 Feet with Two Turns   Reason if not Attempted Safety concerns   Walk 50 Feet with Two Turns CARE Score 88   QI: Walk 150 Feet   Reason if not Attempted Safety concerns   Walk 150 Feet CARE Score 88   QI: Walking 10 Feet on Uneven Surfaces   Reason if not Attempted Safety concerns   Walking 10 Feet on Uneven Surfaces CARE Score 88   Ambulation   Does the patient walk? 2  Yes   Primary Discharge Mode of Locomotion Wheelchair   Walking (FIM) 0 - Activity does not occur   QI: Wheel 50 Feet with [facility] Provided by [place] No physical assistance   Wheel 50 Feet with Two Turns CARE Score 4   QI: Wheel 150 [address] Provided by [place] No physical assistance   Wheel 150 Feet CARE Score 4   Wheelchair mobility   QI: Does the patient use a wheelchair? 1  Yes   QI: Indicate the type of wheelchair 1   Manual   Wheelchair Assist Level Supervision   Distance Level Surface (feet) 150 ft   Findings S   Wheelchair (FIM) 5 - Patient requires supervision/monitoring AND wheels distance 150 feet or more, no rest   QI: 1 Step (Curb)   Reason if not Attempted Safety concerns   1 Step (Curb) CARE Score 88   QI: 4 Steps   Reason if not Attempted Safety concerns   4 Steps CARE Score 88   QI: 12 Steps   Reason if not Attempted Safety concerns   12 Steps CARE Score 88   Stairs   Findings NT   QI: Picking Up Object   Reason if not Attempted Safety concerns   Picking Up Object CARE Score 88   QI: Toilet Transfer   Assistance Needed Physical assistance   Assistance Provided by [place] 75% or more   Toilet Transfer CARE Score 2   Toilet Transfer   Findings Ax2   Toilet Transfer (FIM) 1 - Patient requires assist of two people Equipment Use   NuStep 15 minutes level 2   Assessment   Treatment Assessment 30-minutes session focused on xfer in/out of bed and use of BSC for toileting; pt instructed in use of NuStep x15 minutes, level 2 for B LE and B UE strength/ROM/endurance; finished session supine in bed with al;l needs in reach; recommend cont PT POC; Family/Caregiver Present no   Problem List Decreased strength;Decreased endurance; Impaired balance;Decreased mobility; Decreased cognition;Orthopedic restrictions   Barriers to Discharge Inaccessible home environment;Decreased caregiver support   PT Barriers   Functional Limitation Car transfers; Ramp negotiation;Stair negotiation;Standing;Transfers; Walking; Wheelchair management   Plan   Treatment/Interventions ADL retraining;Functional transfer training;LE strengthening/ROM; Elevations; Therapeutic exercise; Endurance training;Cognitive reorientation;Patient/family training;Equipment eval/education; Bed mobility;Gait training   Progress Progressing toward goals   Recommendation   Recommendation 24 hour supervision/assist;Home PT; Home with family support   Equipment Recommended Wheelchair;Walker   PT Therapy Minutes   PT Time In 1530   PT Time Out 1600   PT Total Time (minutes) 30   PT Mode of treatment - Individual (minutes) 30   PT Mode of treatment - Concurrent (minutes) 0   PT Mode of treatment - Group (minutes) 0   PT Mode of treatment - Co-treat (minutes) 0   PT Mode of Teatment - Total time(minutes) 30 minutes   Therapy Time missed   Time missed?  Wandy Crandall PTA

## (undated) DEVICE — BUTTON SWITCH PENCIL HOLSTER: Brand: VALLEYLAB

## (undated) DEVICE — ACE WRAP 4 IN UNSTERILE

## (undated) DEVICE — REM POLYHESIVE ADULT PATIENT RETURN ELECTRODE: Brand: VALLEYLAB

## (undated) DEVICE — DRAPE C-ARM X-RAY

## (undated) DEVICE — SILVER-COATED ANTIMICROBIAL BARRIER DRESSING: Brand: ACTICOAT   4" X 8"

## (undated) DEVICE — INTENDED FOR TISSUE SEPARATION, AND OTHER PROCEDURES THAT REQUIRE A SHARP SURGICAL BLADE TO PUNCTURE OR CUT.: Brand: BARD-PARKER SAFETY BLADES SIZE 15, STERILE

## (undated) DEVICE — UNIVERSAL MAJOR EXTREMITY,KIT: Brand: CARDINAL HEALTH

## (undated) DEVICE — GLOVE SRG BIOGEL 8

## (undated) DEVICE — BANDAGE, ESMARK LF STR 6"X9' (20/CS): Brand: CYPRESS

## (undated) DEVICE — ABDOMINAL PAD: Brand: DERMACEA

## (undated) DEVICE — 3.2MM GUIDE WIRE 400MM

## (undated) DEVICE — ACE WRAP 6 IN UNSTERILE

## (undated) DEVICE — SPONGE PVP SCRUB WING STERILE

## (undated) DEVICE — INTENDED FOR TISSUE SEPARATION, AND OTHER PROCEDURES THAT REQUIRE A SHARP SURGICAL BLADE TO PUNCTURE OR CUT.: Brand: BARD-PARKER SAFETY BLADES SIZE 10, STERILE

## (undated) DEVICE — SUT VICRYL PLUS 1 CTB-1 36 IN VCPB947H

## (undated) DEVICE — 2.5MM REAMING ROD WITH BALL TIP/950MM-STERILE

## (undated) DEVICE — PADDING CAST 4 IN  COTTON STRL

## (undated) DEVICE — SUT VICRYL PLUS 2-0 CTB-1 27 IN VCPB259H

## (undated) DEVICE — GLOVE INDICATOR PI UNDERGLOVE SZ 8.5 BLUE

## (undated) DEVICE — DRAPE C-ARMOUR

## (undated) DEVICE — CHLORAPREP HI-LITE 26ML ORANGE

## (undated) DEVICE — 1.25MM NON-THREADED GUIDE WIRE 150MM

## (undated) DEVICE — GAUZE SPONGES,16 PLY: Brand: CURITY

## (undated) DEVICE — BULB SYRINGE,IRRIGATION WITH PROTECTIVE CAP: Brand: DOVER